# Patient Record
Sex: FEMALE | Race: OTHER
[De-identification: names, ages, dates, MRNs, and addresses within clinical notes are randomized per-mention and may not be internally consistent; named-entity substitution may affect disease eponyms.]

---

## 2017-10-08 ENCOUNTER — HOSPITAL ENCOUNTER (EMERGENCY)
Dept: HOSPITAL 61 - ER | Age: 82
Discharge: HOME | End: 2017-10-08
Payer: MEDICARE

## 2017-10-08 VITALS — BODY MASS INDEX: 23 KG/M2 | HEIGHT: 62 IN | WEIGHT: 125 LBS

## 2017-10-08 VITALS — SYSTOLIC BLOOD PRESSURE: 159 MMHG | DIASTOLIC BLOOD PRESSURE: 69 MMHG

## 2017-10-08 DIAGNOSIS — I10: ICD-10-CM

## 2017-10-08 DIAGNOSIS — E11.9: ICD-10-CM

## 2017-10-08 DIAGNOSIS — Z79.4: ICD-10-CM

## 2017-10-08 DIAGNOSIS — Z76.0: Primary | ICD-10-CM

## 2017-10-08 PROCEDURE — 82962 GLUCOSE BLOOD TEST: CPT

## 2017-10-08 PROCEDURE — 99283 EMERGENCY DEPT VISIT LOW MDM: CPT

## 2017-10-08 PROCEDURE — 96372 THER/PROPH/DIAG INJ SC/IM: CPT

## 2017-10-08 NOTE — PHYS DOC
Past Medical History


Past Medical History:  Diabetes-Type II, Hypertension


Past Surgical History:  No Surgical History


Alcohol Use:  None


Drug Use:  None





Adult General


Chief Complaint


Chief Complaint:  MEDICATION REFILL





HPI


HPI





Patient is a 86  year old female with history of hypertension and diabetes type 

2 who presents today requesting a NovoLog pen from the ED. Patient states she 

has a prescription  for Novolog but ran out of the medication this morning.  

Patient has no symptoms. Blood sugar was 174 on arrival to the ED.





Review of Systems


Review of Systems





Constitutional: Denies fever or chills []


Eyes: Denies change in visual acuity, redness, or eye pain []


HENT: Denies nasal congestion or sore throat []


Respiratory: Denies cough or shortness of breath []


Cardiovascular: No additional information not addressed in HPI []


GI: Denies abdominal pain, nausea, vomiting, bloody stools or diarrhea []


: Denies dysuria or hematuria []


Musculoskeletal: Denies back pain or joint pain []


Integument: Denies rash or skin lesions []


Neurologic: novolog refill





Current Medications


Current Medications





Current Medications








 Medications


  (Trade)  Dose


 Ordered  Sig/Renetta  Start Time


 Stop Time Status Last Admin


Dose Admin


 


 Insulin Aspart


  (NovoLOG)  6 units  1X  STAT  10/8/17 11:00


 10/8/17 11:01 UNV  


 











Allergies


Allergies





Allergies








Coded Allergies Type Severity Reaction Last Updated Verified


 


  No Known Drug Allergies    10/8/17 No











Physical Exam


Physical Exam





Constitutional: Well developed, well nourished, no acute distress, non-toxic 

appearance. []


HENT: Normocephalic, atraumatic, bilateral external ears normal, oropharynx 

moist, no oral exudates, nose normal. []


Eyes: PERRLA, EOMI, conjunctiva normal, no discharge. [] 


Neck: Normal range of motion, no tenderness, supple, no stridor. [] 


Cardiovascular:Heart rate regular rhythm, no murmur []


Lungs & Thorax:  Bilateral breath sounds clear to auscultation []


Abdomen: Bowel sounds normal, soft, no tenderness, no masses, no pulsatile 

masses. [] 


Skin: Warm, dry, no erythema, no rash. [] 


Back: No tenderness, no CVA tenderness. [] 


Extremities: No tenderness, no cyanosis, no clubbing, ROM intact, no edema. [] 


Neurologic: Alert and oriented X 3, normal motor function, normal sensory 

function, no focal deficits noted. []


Psychologic: Affect normal, judgement normal, mood normal. []





Current Patient Data


Vital Signs





 Vital Signs








  Date Time  Temp Pulse Resp B/P (MAP) Pulse Ox O2 Delivery O2 Flow Rate FiO2


 


10/8/17 10:30 98.2 94 16  97 Room Air  





 98.2       








Lab Values





 Laboratory Tests








Test


  10/8/17


10:22


 


Glucose (Fingerstick)


  174 mg/dL


(70-99)  H











EKG


EKG


[]





Radiology/Procedures


Radiology/Procedures


[]





Course & Med Decision Making


Course & Med Decision Making


Pertinent Labs and Imaging studies reviewed. (See chart for details)





Patient is in the ED requesting a refill for NovoLog pen. She has a 

prescription with multiple refills but is not able to go to the pharmacy today. 

NovoLog pain was provided to this patient. She is to ensure she refills her 

medication before they ran out. BG was 174 in the Ed





Dragon Disclaimer


Dragon Disclaimer


This electronic medical record was generated, in whole or in part, using a 

voice recognition dictation system.





Departure


Departure


Impression:  


 Primary Impression:  


 Encounter for medication refill


Disposition:  01 HOME, SELF-CARE


Condition:  STABLE


Referrals:  


ROSALIA ELIZONDO MD (PCP)


follow up with your  doctor this week


Patient Instructions:  Diabetes Meal Planning Guide, Diabetes and Exercise-

SportsMed





Additional Instructions:  


We gave you NovoLog pen in the ED. Ensure you always refill your prescriptions 

prior to completing your medications. Follow-up with your doctor this week.











NANCY ZEPEDA Oct 8, 2017 11:08

## 2019-08-19 ENCOUNTER — HOSPITAL ENCOUNTER (INPATIENT)
Dept: HOSPITAL 61 - ER | Age: 84
LOS: 3 days | Discharge: NURSING, REHAB OR LONG-TERM CARE | DRG: 64 | End: 2019-08-22
Attending: INTERNAL MEDICINE | Admitting: INTERNAL MEDICINE
Payer: MEDICARE

## 2019-08-19 VITALS — DIASTOLIC BLOOD PRESSURE: 64 MMHG | SYSTOLIC BLOOD PRESSURE: 144 MMHG

## 2019-08-19 VITALS — DIASTOLIC BLOOD PRESSURE: 43 MMHG | SYSTOLIC BLOOD PRESSURE: 102 MMHG

## 2019-08-19 VITALS — DIASTOLIC BLOOD PRESSURE: 63 MMHG | SYSTOLIC BLOOD PRESSURE: 144 MMHG

## 2019-08-19 VITALS — SYSTOLIC BLOOD PRESSURE: 114 MMHG | DIASTOLIC BLOOD PRESSURE: 54 MMHG

## 2019-08-19 VITALS — SYSTOLIC BLOOD PRESSURE: 132 MMHG | DIASTOLIC BLOOD PRESSURE: 62 MMHG

## 2019-08-19 VITALS — SYSTOLIC BLOOD PRESSURE: 119 MMHG | DIASTOLIC BLOOD PRESSURE: 57 MMHG

## 2019-08-19 VITALS — WEIGHT: 138.44 LBS | BODY MASS INDEX: 27.18 KG/M2 | HEIGHT: 60 IN

## 2019-08-19 VITALS — SYSTOLIC BLOOD PRESSURE: 139 MMHG | DIASTOLIC BLOOD PRESSURE: 63 MMHG

## 2019-08-19 VITALS — DIASTOLIC BLOOD PRESSURE: 56 MMHG | SYSTOLIC BLOOD PRESSURE: 118 MMHG

## 2019-08-19 VITALS — SYSTOLIC BLOOD PRESSURE: 178 MMHG | DIASTOLIC BLOOD PRESSURE: 74 MMHG

## 2019-08-19 VITALS — DIASTOLIC BLOOD PRESSURE: 63 MMHG | SYSTOLIC BLOOD PRESSURE: 142 MMHG

## 2019-08-19 VITALS — SYSTOLIC BLOOD PRESSURE: 126 MMHG | DIASTOLIC BLOOD PRESSURE: 59 MMHG

## 2019-08-19 VITALS — SYSTOLIC BLOOD PRESSURE: 144 MMHG | DIASTOLIC BLOOD PRESSURE: 63 MMHG

## 2019-08-19 VITALS — DIASTOLIC BLOOD PRESSURE: 56 MMHG | SYSTOLIC BLOOD PRESSURE: 114 MMHG

## 2019-08-19 VITALS — SYSTOLIC BLOOD PRESSURE: 118 MMHG | DIASTOLIC BLOOD PRESSURE: 56 MMHG

## 2019-08-19 VITALS — DIASTOLIC BLOOD PRESSURE: 62 MMHG | SYSTOLIC BLOOD PRESSURE: 139 MMHG

## 2019-08-19 VITALS — SYSTOLIC BLOOD PRESSURE: 149 MMHG | DIASTOLIC BLOOD PRESSURE: 65 MMHG

## 2019-08-19 DIAGNOSIS — Z90.49: ICD-10-CM

## 2019-08-19 DIAGNOSIS — I63.40: Primary | ICD-10-CM

## 2019-08-19 DIAGNOSIS — F41.9: ICD-10-CM

## 2019-08-19 DIAGNOSIS — I10: ICD-10-CM

## 2019-08-19 DIAGNOSIS — E78.00: ICD-10-CM

## 2019-08-19 DIAGNOSIS — E11.9: ICD-10-CM

## 2019-08-19 DIAGNOSIS — I25.10: ICD-10-CM

## 2019-08-19 DIAGNOSIS — E87.1: ICD-10-CM

## 2019-08-19 DIAGNOSIS — G93.41: ICD-10-CM

## 2019-08-19 DIAGNOSIS — F32.9: ICD-10-CM

## 2019-08-19 DIAGNOSIS — E78.5: ICD-10-CM

## 2019-08-19 DIAGNOSIS — I21.A1: ICD-10-CM

## 2019-08-19 DIAGNOSIS — I35.0: ICD-10-CM

## 2019-08-19 LAB
ALBUMIN SERPL-MCNC: 3.4 G/DL (ref 3.4–5)
ALBUMIN/GLOB SERPL: 0.9 {RATIO} (ref 1–1.7)
ALP SERPL-CCNC: 68 U/L (ref 46–116)
ALT SERPL-CCNC: 22 U/L (ref 14–59)
ANION GAP SERPL CALC-SCNC: 8 MMOL/L (ref 6–14)
APTT PPP: (no result) S
AST SERPL-CCNC: 22 U/L (ref 15–37)
BACTERIA #/AREA URNS HPF: (no result) /HPF
BASOPHILS # BLD AUTO: 0 X10^3/UL (ref 0–0.2)
BASOPHILS NFR BLD: 0 % (ref 0–3)
BILIRUB SERPL-MCNC: 0.5 MG/DL (ref 0.2–1)
BILIRUB UR QL STRIP: NEGATIVE
BUN SERPL-MCNC: 18 MG/DL (ref 7–20)
BUN/CREAT SERPL: 18 (ref 6–20)
CALCIUM SERPL-MCNC: 8.9 MG/DL (ref 8.5–10.1)
CHLORIDE SERPL-SCNC: 92 MMOL/L (ref 98–107)
CO2 SERPL-SCNC: 25 MMOL/L (ref 21–32)
CREAT SERPL-MCNC: 1 MG/DL (ref 0.6–1)
EOSINOPHIL NFR BLD: 0 % (ref 0–3)
EOSINOPHIL NFR BLD: 0 X10^3/UL (ref 0–0.7)
ERYTHROCYTE [DISTWIDTH] IN BLOOD BY AUTOMATED COUNT: 12.9 % (ref 11.5–14.5)
FIBRINOGEN PPP-MCNC: CLEAR MG/DL
GFR SERPLBLD BASED ON 1.73 SQ M-ARVRAT: 52.3 ML/MIN
GLOBULIN SER-MCNC: 3.7 G/DL (ref 2.2–3.8)
GLUCOSE SERPL-MCNC: 103 MG/DL (ref 70–99)
HCT VFR BLD CALC: 29.9 % (ref 36–47)
HGB BLD-MCNC: 10.5 G/DL (ref 12–15.5)
LYMPHOCYTES # BLD: 1 X10^3/UL (ref 1–4.8)
LYMPHOCYTES NFR BLD AUTO: 9 % (ref 24–48)
MCH RBC QN AUTO: 29 PG (ref 25–35)
MCHC RBC AUTO-ENTMCNC: 35 G/DL (ref 31–37)
MCV RBC AUTO: 83 FL (ref 79–100)
MONO #: 0.8 X10^3/UL (ref 0–1.1)
MONOCYTES NFR BLD: 7 % (ref 0–9)
NEUT #: 9.4 X10^3/UL (ref 1.8–7.7)
NEUTROPHILS NFR BLD AUTO: 83 % (ref 31–73)
NITRITE UR QL STRIP: NEGATIVE
PH UR STRIP: 6.5 [PH]
PLATELET # BLD AUTO: 297 X10^3/UL (ref 140–400)
POTASSIUM SERPL-SCNC: 4.3 MMOL/L (ref 3.5–5.1)
PROT SERPL-MCNC: 7.1 G/DL (ref 6.4–8.2)
PROT UR STRIP-MCNC: 30 MG/DL
RBC # BLD AUTO: 3.59 X10^6/UL (ref 3.5–5.4)
RBC #/AREA URNS HPF: 0 /HPF (ref 0–2)
SODIUM SERPL-SCNC: 125 MMOL/L (ref 136–145)
SQUAMOUS #/AREA URNS LPF: (no result) /LPF
UROBILINOGEN UR-MCNC: 0.2 MG/DL
WBC # BLD AUTO: 11.2 X10^3/UL (ref 4–11)
WBC #/AREA URNS HPF: (no result) /HPF (ref 0–4)

## 2019-08-19 PROCEDURE — 93325 DOPPLER ECHO COLOR FLOW MAPG: CPT

## 2019-08-19 PROCEDURE — 99153 MOD SED SAME PHYS/QHP EA: CPT

## 2019-08-19 PROCEDURE — 80048 BASIC METABOLIC PNL TOTAL CA: CPT

## 2019-08-19 PROCEDURE — 80061 LIPID PANEL: CPT

## 2019-08-19 PROCEDURE — 85025 COMPLETE CBC W/AUTO DIFF WBC: CPT

## 2019-08-19 PROCEDURE — 93306 TTE W/DOPPLER COMPLETE: CPT

## 2019-08-19 PROCEDURE — 70551 MRI BRAIN STEM W/O DYE: CPT

## 2019-08-19 PROCEDURE — 82550 ASSAY OF CK (CPK): CPT

## 2019-08-19 PROCEDURE — 93320 DOPPLER ECHO COMPLETE: CPT

## 2019-08-19 PROCEDURE — 36415 COLL VENOUS BLD VENIPUNCTURE: CPT

## 2019-08-19 PROCEDURE — B2151ZZ FLUOROSCOPY OF LEFT HEART USING LOW OSMOLAR CONTRAST: ICD-10-PCS | Performed by: INTERNAL MEDICINE

## 2019-08-19 PROCEDURE — 84300 ASSAY OF URINE SODIUM: CPT

## 2019-08-19 PROCEDURE — B2111ZZ FLUOROSCOPY OF MULTIPLE CORONARY ARTERIES USING LOW OSMOLAR CONTRAST: ICD-10-PCS | Performed by: INTERNAL MEDICINE

## 2019-08-19 PROCEDURE — C1769 GUIDE WIRE: HCPCS

## 2019-08-19 PROCEDURE — 95816 EEG AWAKE AND DROWSY: CPT

## 2019-08-19 PROCEDURE — 93880 EXTRACRANIAL BILAT STUDY: CPT

## 2019-08-19 PROCEDURE — C1892 INTRO/SHEATH,FIXED,PEEL-AWAY: HCPCS

## 2019-08-19 PROCEDURE — 84484 ASSAY OF TROPONIN QUANT: CPT

## 2019-08-19 PROCEDURE — 71045 X-RAY EXAM CHEST 1 VIEW: CPT

## 2019-08-19 PROCEDURE — 4A023N7 MEASUREMENT OF CARDIAC SAMPLING AND PRESSURE, LEFT HEART, PERCUTANEOUS APPROACH: ICD-10-PCS | Performed by: INTERNAL MEDICINE

## 2019-08-19 PROCEDURE — 93005 ELECTROCARDIOGRAM TRACING: CPT

## 2019-08-19 PROCEDURE — 70450 CT HEAD/BRAIN W/O DYE: CPT

## 2019-08-19 PROCEDURE — 80053 COMPREHEN METABOLIC PANEL: CPT

## 2019-08-19 PROCEDURE — 93458 L HRT ARTERY/VENTRICLE ANGIO: CPT

## 2019-08-19 PROCEDURE — 83880 ASSAY OF NATRIURETIC PEPTIDE: CPT

## 2019-08-19 PROCEDURE — G0378 HOSPITAL OBSERVATION PER HR: HCPCS

## 2019-08-19 PROCEDURE — 82962 GLUCOSE BLOOD TEST: CPT

## 2019-08-19 PROCEDURE — 99152 MOD SED SAME PHYS/QHP 5/>YRS: CPT

## 2019-08-19 PROCEDURE — 84443 ASSAY THYROID STIM HORMONE: CPT

## 2019-08-19 PROCEDURE — 81001 URINALYSIS AUTO W/SCOPE: CPT

## 2019-08-19 PROCEDURE — 93312 ECHO TRANSESOPHAGEAL: CPT

## 2019-08-19 RX ADMIN — BACITRACIN SCH MLS/HR: 5000 INJECTION, POWDER, FOR SOLUTION INTRAMUSCULAR at 12:05

## 2019-08-19 RX ADMIN — LISINOPRIL SCH MG: 10 TABLET ORAL at 20:08

## 2019-08-19 RX ADMIN — INSULIN LISPRO SCH UNITS: 100 INJECTION, SOLUTION INTRAVENOUS; SUBCUTANEOUS at 17:00

## 2019-08-19 RX ADMIN — INSULIN GLARGINE SCH UNITS: 100 INJECTION, SOLUTION SUBCUTANEOUS at 20:21

## 2019-08-19 RX ADMIN — INSULIN LISPRO SCH UNITS: 100 INJECTION, SOLUTION INTRAVENOUS; SUBCUTANEOUS at 17:30

## 2019-08-19 RX ADMIN — SODIUM CHLORIDE SCH MLS/HR: 450 INJECTION, SOLUTION INTRAVENOUS at 12:18

## 2019-08-19 RX ADMIN — SODIUM CHLORIDE SCH MLS/HR: 450 INJECTION, SOLUTION INTRAVENOUS at 20:49

## 2019-08-19 RX ADMIN — BACITRACIN SCH MLS/HR: 5000 INJECTION, POWDER, FOR SOLUTION INTRAMUSCULAR at 04:41

## 2019-08-19 NOTE — PDOC
MODERATE SEDATION ASSESSMENT


RISKS/ALTERNATIVES


Risks/Alternatives


Risks and alternatives of this type of sedation and procedure discussed with:


RISK/ALTERNATIVES:  Patient





H & P ON CHART


H & P


H & P on chart and reviewed for co-morbid conditions and appropriate labs.


H&P ON CHART:  Yes





PREGNANCY STATUS


PREG STATUS ASSESSED:  N/A





MEDS/ALLERGIES REVIEWED


Meds/Allergies Reviewed


Medications and Allergies including time and route of recently administered 

narcotics and sedatives.


MEDS/ALLERGIES REVIEWED:  Yes





ASA RATING


ASA RATING:  III





AIRWAY ASSESSMENT


Airway Assessment


Airway patency, oral function limitations, presence  of caps, crowns, dentures, 

partials, and ability to extend neck assessed.


AIRWAY ASSESSMENT:  Yes





MALLAMPATI SCORE


MALLAMPATI SCORE:  II





PRE-SEDATION ASSESSMENT


PRE-SEDATION ASSESSMENT:  Yes











JOSE COTO MD           Aug 19, 2019 10:48

## 2019-08-19 NOTE — PDOC2
CONSULT


Date of Consult


Date of Consult


DATE: 8/19/19 


TIME: 09:34





Reason for Consult


Reason for Consult:


Elevated troponin level





Referring Physician


Referring Physician:


Dr. Goss





Identification/Chief Complaint


Chief Complaint


Dizziness and shortness of breath





Source


Source:  Chart review, Patient





History of Present Illness


Reason for Visit:


88-year-old female presented complaining of dizziness, dyspnea and retrosternal 

chest tightness. She denied any orthopnea/PND, palpitations or syncope. She 

denied any previous cardiac history other than 'heart murmur'.





Past Medical History


Past Medical History


Hypertension


Hypercholesterolemia


Diabetes mellitus type 2





Past Surgical History


Past Surgical History


Cholecystectomy





Family History


Family History


Negative for premature coronary artery disease





Social History


Social History


Denied any smoking, alcohol or drug use





Current Medications


Current Medications





Current Medications


Aspirin (Ecotrin) 324 mg 1X  ONCE PO  Last administered on 8/19/19at 03:10;  S

tart 8/19/19 at 03:30;  Stop 8/19/19 at 03:31;  Status DC


Ondansetron HCl (Zofran) 4 mg PRN Q8HRS  PRN IV NAUSEA/VOMITING 1ST CHOICE;  

Start 8/19/19 at 04:30;  Stop 8/20/19 at 04:29


Sodium Chloride 1,000 ml @  75 mls/hr [id number] IV  Last administered on 8/19/19at

04:41;  Start 8/19/19 at 05:00;  Stop 8/20/19 at 04:59


Lidocaine HCl (Xylocaine-Mpf 1% 2ml Vial) 2 ml STK-MED ONCE .ROUTE ;  Start 

8/19/19 at 09:30;  Stop 8/19/19 at 09:31;  Status DC


Iohexol (Omnipaque 300 Mg/ml) 100 ml STK-MED ONCE .ROUTE ;  Start 8/19/19 at 

09:30;  Stop 8/19/19 at 09:31;  Status DC


Heparin Sodium/ Sodium Chloride 500 ml @  As Directed STK-MED ONCE .ROUTE ;  

Start 8/19/19 at 09:30;  Stop 8/19/19 at 09:31;  Status DC





Active Scripts


Active


Reported


Novolog Flexpen (Insulin Aspart) 100 Unit/1 Ml Insuln.pen 10 Unit SQ DAILYWSUP


Novolog Flexpen (Insulin Aspart) 100 Unit/1 Ml Insuln.pen 5 Unit SQ DAILYWBKFT


Vitamin E (Vitamin E Acid Succinate) 100 Unit Tablet 100 Unit PO DAILY


Coq-10 (Ubidecarenone) 100 Mg Capsule 100 Mg PO DAILY


Lantus Solostar (Insulin Glargine,Hum.rec.anlog) 100 Unit/1 Ml Insuln.pen 17 

Unit SQ QHS


Caltrate 600 + D Soft Chew Tab (Calcium Carbonate/Vitamin D3) 1 Each Tab.chew 1 

Each PO DAILY


One Daily For Women 50+ Adv Tb (Multivitamins-Min/Fa/Ginkgo) 1 Each Tablet 1 

Each PO DAILY


Tylenol (Acetaminophen) 325 Mg Tablet 325 Mg PO DAILY


Aspirin Ec (Aspirin) 325 Mg Tablet.dr 325 Mg PO DAILY


Amlodipine Besylate 5 Mg Tablet 5 Mg PO DAILY


Zestril (Lisinopril) 10 Mg Tablet 10 Mg PO BID


Metformin Hcl 1,000 Mg Tablet 1,000 Mg PO BIDWMEALS





Allergies


Allergies:  


Coded Allergies:  


     No Known Drug Allergies (Unverified , 10/8/17)





ROS


PSYCHOLOGICAL ROS:  No: Hallucinations


Eyes:  No Loss of vision


HEENT:  No: Epistaxis


Respiratory:  YES: Shortness of breath; 


   No: Hemoptysis


Cardiovascular:  yes Chest Pain


Genitourinary:  No Hematuria


Neurological:  No Seizures


Skin:  No Rash





Physical Exam


General:  Alert, Oriented X3


HEENT:  Atraumatic, PERRLA


Lungs:  Clear to auscultation


Heart:  Regular rate, Other (ESM LUPSB)


Abdomen:  Soft, No tenderness


Extremities:  No edema


Neuro:  Normal speech


Psych/Mental Status:  Mood NL





Vitals


VITALS





Vital Signs








  Date Time  Temp Pulse Resp B/P (MAP) Pulse Ox O2 Delivery O2 Flow Rate FiO2


 


8/19/19 07:00 98.5 62 18 139/63 (88) 97 Room Air  





 98.5       











Labs


Labs





Laboratory Tests








Test


 8/19/19


02:15 8/19/19


07:22 8/19/19


07:25


 


White Blood Count


 11.2 x10^3/uL


(4.0-11.0) 


 





 


Red Blood Count


 3.59 x10^6/uL


(3.50-5.40) 


 





 


Hemoglobin


 10.5 g/dL


(12.0-15.5) 


 





 


Hematocrit


 29.9 %


(36.0-47.0) 


 





 


Mean Corpuscular Volume 83 fL ()   


 


Mean Corpuscular Hemoglobin 29 pg (25-35)   


 


Mean Corpuscular Hemoglobin


Concent 35 g/dL


(31-37) 


 





 


Red Cell Distribution Width


 12.9 %


(11.5-14.5) 


 





 


Platelet Count


 297 x10^3/uL


(140-400) 


 





 


Neutrophils (%) (Auto) 83 % (31-73)   


 


Lymphocytes (%) (Auto) 9 % (24-48)   


 


Monocytes (%) (Auto) 7 % (0-9)   


 


Eosinophils (%) (Auto) 0 % (0-3)   


 


Basophils (%) (Auto) 0 % (0-3)   


 


Neutrophils # (Auto)


 9.4 x10^3/uL


(1.8-7.7) 


 





 


Lymphocytes # (Auto)


 1.0 x10^3/uL


(1.0-4.8) 


 





 


Monocytes # (Auto)


 0.8 x10^3/uL


(0.0-1.1) 


 





 


Eosinophils # (Auto)


 0.0 x10^3/uL


(0.0-0.7) 


 





 


Basophils # (Auto)


 0.0 x10^3/uL


(0.0-0.2) 


 





 


Sodium Level


 125 mmol/L


(136-145) 


 





 


Potassium Level


 4.3 mmol/L


(3.5-5.1) 


 





 


Chloride Level


 92 mmol/L


() 


 





 


Carbon Dioxide Level


 25 mmol/L


(21-32) 


 





 


Anion Gap 8 (6-14)   


 


Blood Urea Nitrogen


 18 mg/dL


(7-20) 


 





 


Creatinine


 1.0 mg/dL


(0.6-1.0) 


 





 


Estimated GFR


(Cockcroft-Gault) 52.3 


 


 





 


BUN/Creatinine Ratio 18 (6-20)   


 


Glucose Level


 103 mg/dL


(70-99) 


 





 


Calcium Level


 8.9 mg/dL


(8.5-10.1) 


 





 


Total Bilirubin


 0.5 mg/dL


(0.2-1.0) 


 





 


Aspartate Amino Transf


(AST/SGOT) 22 U/L (15-37) 


 


 





 


Alanine Aminotransferase


(ALT/SGPT) 22 U/L (14-59) 


 


 





 


Alkaline Phosphatase


 68 U/L


() 


 





 


Creatine Kinase


 137 U/L


() 


 





 


Troponin I Quantitative


 0.334 ng/mL


(0.000-0.055) 


 1.305 ng/mL


(0.000-0.055)


 


NT-Pro-B-Type Natriuretic


Peptide 857 pg/mL


(0-449) 


 





 


Total Protein


 7.1 g/dL


(6.4-8.2) 


 





 


Albumin


 3.4 g/dL


(3.4-5.0) 


 





 


Albumin/Globulin Ratio 0.9 (1.0-1.7)   


 


Thyroid Stimulating Hormone


(TSH) 5.277 uIU/mL


(0.358-3.74) 


 





 


Glucose (Fingerstick)


 


 89 mg/dL


(70-99) 











Laboratory Tests








Test


 8/19/19


02:15 8/19/19


07:22 8/19/19


07:25


 


White Blood Count


 11.2 x10^3/uL


(4.0-11.0) 


 





 


Red Blood Count


 3.59 x10^6/uL


(3.50-5.40) 


 





 


Hemoglobin


 10.5 g/dL


(12.0-15.5) 


 





 


Hematocrit


 29.9 %


(36.0-47.0) 


 





 


Mean Corpuscular Volume 83 fL ()   


 


Mean Corpuscular Hemoglobin 29 pg (25-35)   


 


Mean Corpuscular Hemoglobin


Concent 35 g/dL


(31-37) 


 





 


Red Cell Distribution Width


 12.9 %


(11.5-14.5) 


 





 


Platelet Count


 297 x10^3/uL


(140-400) 


 





 


Neutrophils (%) (Auto) 83 % (31-73)   


 


Lymphocytes (%) (Auto) 9 % (24-48)   


 


Monocytes (%) (Auto) 7 % (0-9)   


 


Eosinophils (%) (Auto) 0 % (0-3)   


 


Basophils (%) (Auto) 0 % (0-3)   


 


Neutrophils # (Auto)


 9.4 x10^3/uL


(1.8-7.7) 


 





 


Lymphocytes # (Auto)


 1.0 x10^3/uL


(1.0-4.8) 


 





 


Monocytes # (Auto)


 0.8 x10^3/uL


(0.0-1.1) 


 





 


Eosinophils # (Auto)


 0.0 x10^3/uL


(0.0-0.7) 


 





 


Basophils # (Auto)


 0.0 x10^3/uL


(0.0-0.2) 


 





 


Sodium Level


 125 mmol/L


(136-145) 


 





 


Potassium Level


 4.3 mmol/L


(3.5-5.1) 


 





 


Chloride Level


 92 mmol/L


() 


 





 


Carbon Dioxide Level


 25 mmol/L


(21-32) 


 





 


Anion Gap 8 (6-14)   


 


Blood Urea Nitrogen


 18 mg/dL


(7-20) 


 





 


Creatinine


 1.0 mg/dL


(0.6-1.0) 


 





 


Estimated GFR


(Cockcroft-Gault) 52.3 


 


 





 


BUN/Creatinine Ratio 18 (6-20)   


 


Glucose Level


 103 mg/dL


(70-99) 


 





 


Calcium Level


 8.9 mg/dL


(8.5-10.1) 


 





 


Total Bilirubin


 0.5 mg/dL


(0.2-1.0) 


 





 


Aspartate Amino Transf


(AST/SGOT) 22 U/L (15-37) 


 


 





 


Alanine Aminotransferase


(ALT/SGPT) 22 U/L (14-59) 


 


 





 


Alkaline Phosphatase


 68 U/L


() 


 





 


Creatine Kinase


 137 U/L


() 


 





 


Troponin I Quantitative


 0.334 ng/mL


(0.000-0.055) 


 1.305 ng/mL


(0.000-0.055)


 


NT-Pro-B-Type Natriuretic


Peptide 857 pg/mL


(0-449) 


 





 


Total Protein


 7.1 g/dL


(6.4-8.2) 


 





 


Albumin


 3.4 g/dL


(3.4-5.0) 


 





 


Albumin/Globulin Ratio 0.9 (1.0-1.7)   


 


Thyroid Stimulating Hormone


(TSH) 5.277 uIU/mL


(0.358-3.74) 


 





 


Glucose (Fingerstick)


 


 89 mg/dL


(70-99) 














Assessment/Plan


Assessment/Plan


1.  Acute non-STEMI. Patient's shortness of breath is also probably anginal 

equivalent. Chest x-ray did not show any evidence of congestive heart failure. 

The option of cardiac catheterization for definitive evaluation was discussed in

detail and she is agreeable.


2.  Accelerated hypertension:  Better controlled since admission


3.  Diabetes mellitus type 2:  Treat per IM. Hold metformin for cardiac cath.


Thank you for your consultation











JOSE COTO MD           Aug 19, 2019 09:34

## 2019-08-19 NOTE — CARD
MR#: [id number]

Account#: [id number]

Accession#: [id number]

Date of Study: 08/19/2019

Ordering Physician: JOSE COTO, 

Referring Physician: JOSE COTO 

Tech: PASTORA BOSTON RTR





--------------- APPROVED REPORT --------------





Technologist: PASTORA BOSTON RTR

Nurse: Noni Anaya R.N.



Procedure(s) performed: Left heart catheterization, selective coronary angiography and left ventricul
ography via right transradial approach



MODERATE SEDATION TIME: 31 MINUTES

FLUORO TIME: 4.1 MIN

DOSE: 24.1 GYCM2

CONTRAST: 94



INDICATION

The indication(s) include : non-STEMI .



[facility] Clinical Frailty Scale

[facility] Clinical Frailty Scale: Mildly Frail



Heart Failure

Heart Failure: No



PROCEDURE NARRATIVE

After explaining the risks, benefits and alternative options, informed consent was obtained from maryann
ent.  Patient was brought to the cardiac Cath Lab and right wrist was prepped and draped in the usual
 fashion after confirming a positive modified Charan's test.  Arterial access was obtained in the [facility]
t radial artery and a 6 Macedonian sheath was inserted.  6 Macedonian Leonardo catheter and 6 Macedonian JL 3.5 cat
heter were used to perform selective angiography of the right and left coronary arteries.  6 Macedonian p
igtail catheter was used to perform left ventriculography.  Patient tolerated the procedure well.  He
mostasis was achieved using TR band.  There were no immediate complications.  The following findings 
were noted.



FINDINGS

1.  Hemodynamics: Left ventricular end-diastolic pressure of 23 mmHg.  No pullback gradient across th
e aortic valve.

2.  Left ventriculography: Hyperdynamic left ventricle systolic function with ejection fraction estim
ated at 80%.  No significant mitral regurgitation seen.

3.  Coronary angiography:

a.  The left main coronary artery arose from the left sinus of Valsalva, gave rise to the left anteri
or descending and left circumflex arteries and did not show any significant stenosis.

b.  The left anterior descending artery showed 30% stenosis in the midsegment.

c.  The left circumflex artery did not show any significant stenosis.

d.  The right coronary artery was a large and dominant vessel arising from the right sinus of Valsalv
a that did showed 40% stenosis in the posterior descending branch.



Conclusion

1.  Nonobstructive coronary artery disease

2.  Hyperdynamic left ventricle systolic function with ejection fraction estimated at 80%



Recommendations

Patient's non-STEMI is most probably type 2/demand ischemia. Recommend medical management.



Signed by : Jose Coto, 

Electronically Approved : 08/19/2019 11:03:29

## 2019-08-19 NOTE — NUR
Dr. Silva returned phone call. Informed him that the Trop was 0.33 and has went up to 
1.305 this morning for the second draw and that there is some elevation in EKG. Instructed 
RN to wait until nurse practitioner rounds.

## 2019-08-19 NOTE — EKG
[facility]

              [address] [place] [address]

Test Date:    2019               Test Time:    05:52:22

Pat Name:     OSCAR VILLAFANA           Department:   

Patient ID:   [id number]           Room:         260 1

Gender:       F                        Technician:   [facility]

:          1930               Requested By: VIRGINIA BASHIR

Order Number: [id number]           Reading MD:   Fred Coronel MD

                                 Measurements

Intervals                              Axis          

Rate:         68                       P:            59

MS:           188                      QRS:          -33

QRSD:         88                       T:            68

QT:           414                                    

QTc:          445                                    

                           Interpretive Statements

SINUS RHYTHM



Electronically Signed On 2019 9:53:18 CDT by Fred Coronel MD Unknown

## 2019-08-19 NOTE — NUR
Patient arrived to unit at 0340 accompanied by son and ED nurse. VS stable, Assessment 
complete. No complaints of pain at this time. Patient stated that she has two lumps on the 
back of her head that are new within the last couple days but has not had any type of fall. 
per patient she has been dizzy for the past two days. Resting comfortably on RA. bed in low 
locked position, call light in reach. will continue to monitor patient.

## 2019-08-19 NOTE — CARD
MR#: [id number]

Account#: [id number]

Accession#: [id number]

Date of Study: 08/19/2019

Ordering Physician: JOSE COTO, 

Referring Physician: JOSE COTO 

Tech: Lizzy Olson RDCS





--------------- APPROVED REPORT --------------





EXAM: Two-dimensional and M-mode echocardiogram with Doppler and color Doppler.



Other Information 

Quality : AverageHR: 60bpm

Rhythm : NSR



INDICATION

Non STEMI



2D DIMENSIONS 

RVDd2.0 (2.9-3.5cm)Left Atrium(2D)3.5 (1.6-4.0cm)

IVSd1.1 (0.7-1.1cm)Aortic Root(2D)2.6 (2.0-3.7cm)

LVDd3.9 (3.9-5.9cm)LVOT Diameter1.7 (1.8-2.4cm)

PWd0.8 (0.7-1.1cm)LVDs2.2 (2.5-4.0cm)

FS (%) 43.8 %SV50.6 ml



M-Mode DIMENSIONS 

Left Atrium(MM)3.95 (2.5-4.0cm)Aortic Root2.58 (2.2-3.7cm)



Aortic Valve

AoV Peak Femi.188.8cm/sAoV VTI49.5cm

AO Peak GR.14.3mmHgLVOT  VTI 25.78cm

AO Mean GR.10mmHgAVA   (VTI)1.40cm2



Mitral Valve

MV E Xpnweubl466.5cm/sMV E Peak Gr.8mmHg

MV DECEL [id number] A Shtwqwfk396.8cm/s

MV E Mean Gr.3mmHgE/A  Ratio0.9

MV A Myidlnlp212fq



TDI

Medial E' P. V5.28cm/sE/Medial E'22.6



Tricuspid Valve

TR P. Qjqkngmo575su/sRAP OFHJRMKF7ewTa

TR Peak Gr.01qvVoBLWU54znIy



 LEFT VENTRICLE 

The left ventricle is normal size. There is mild concentric left ventricular hypertrophy. The left ve
ntricle is hyperdynamic. The Ejection Fraction is >65%. There is normal LV segmental wall motion. Tra
nsmitral Doppler flow pattern is abnormal.



 RIGHT VENTRICLE 

The right ventricle is normal size. There is normal right ventricular wall thickness. The right ventr
icular systolic function is normal.



 ATRIA 

The left atrium size is normal. The right atrium size is normal. The interatrial septum is intact wit
h no evidence for an atrial septal defect or patent foramen ovale as noted on 2-D or Doppler imaging.




 AORTIC VALVE 

The aortic valve is mildly calcified. The aortic valve is trileaflet. Doppler and Color Flow revealed
 no significant aortic regurgitation. There is mild valvular aortic stenosis. Calculated aortic valve
 area is 1.4 cm2 with maximum pressure gradient of 14 mmHg and mean pressure gradient of 10 mmHg.



 MITRAL VALVE 

Mitral annular calcification is moderate. There is no evidence of mitral valve prolapse. There is no 
mitral valve stenosis. Doppler and Color-flow revealed trace to mild mitral regurgitation.



 TRICUSPID VALVE 

The tricuspid valve is normal in structure and function. Doppler and Color Flow revealed trace tricus
pid regurgitation. The PA pressure was estimated at 26 mmHg. There is no tricuspid valve prolapse or 
vegetation. There is no tricuspid valve stenosis.



 PULMONIC VALVE 

The pulmonic valve is not well visualized.



 GREAT VESSELS 

The aortic root is normal in size. The ascending aorta is normal in size. The IVC is normal in size a
nd collapses >50% with inspiration.



 PERICARDIAL EFFUSION 

There is no evidence of significant pericardial effusion.



Critical Notification

Critical Value: No



<Conclusion>

The left ventricle is normal size.

The left ventricle is hyperdynamic.

The Ejection Fraction is >65%.

There is mild concentric left ventricular hypertrophy.

There is mild valvular aortic stenosis. 

Calculated aortic valve area is 1.4 cm2 with maximum pressure gradient of 14 mmHg and mean pressure g
radient of 10 mmHg.

Doppler and Color Flow revealed no significant aortic regurgitation.

Doppler and Color-flow revealed trace to mild mitral regurgitation.

Doppler and Color Flow revealed trace tricuspid regurgitation.

The PA pressure was estimated at 26 mmHg.



Signed by : Willy Odell MD

Electronically Approved : 08/19/2019 13:21:40

## 2019-08-19 NOTE — NUR
SS following for discharge planning. SS reviewed pt chart. Pt is from home and is currently 
on room air. PT/OT ordered. SS will await PT/OT evaluations and recommendations and will 
proceed accordingly with discharge planning.

## 2019-08-19 NOTE — RAD
EXAM: CT HEAD WITHOUT CONTRAST.

 

HISTORY: Dizziness, vertigo.

 

TECHNIQUE: Computed tomography of the head was performed without 

intravenous contrast.

 

COMPARISON: None.

 

FINDINGS: There is no intracranial hemorrhage. Hypoattenuation within the 

white matter indicates moderate to severe chronic microangiopathic change.

Prominence of the lateral ventricles and hemispheric sulci indicates 

moderate atrophy.

 

The visualized paranasal sinuses appear clear. There are changes of 

bilateral cataract surgery. The temporal bones are unremarkable. The 

calvarium reveals no suspicious lesions. There are atherosclerotic 

calcifications of the internal carotid and vertebral arteries.

 

IMPRESSION:

1. No acute intracranial findings.

2. 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 

matter change.

 

 

*One or more of the following individualized dose reduction techniques 

were utilized for this examination:  

1. Automated exposure control.  

2. Adjustment of the mA and/or kV according to patient size.  

3. Use of iterative reconstruction technique.

 

Electronically signed by: TITA Martinez MD (8/19/2019 3:25 AM) 

[facility]

## 2019-08-19 NOTE — PHYS DOC
Past Medical History


Past Medical History:  CAD, Diabetes-Type II, High Cholesterol, Hypertension


Past Surgical History:  Cholecystectomy


Alcohol Use:  None


Drug Use:  None





Adult General


Chief Complaint


Chief Complaint:  DIZZY/LIGHT HEADED





HPI


HPI


Patient is a 88  year old female presents with multiple medical complaints. Pa

tient complaints of posterior chest pressure, dizziness upon standing is 

resolved when supine. Patient denies change of vision, neck pain, palpitations, 

focal extremity weakness or loss of sensation. Denies history of CAD. Patient 

also reports partial numbness in left arm and nausea. No vomiting. No other 

acute symptoms or complaints. Symptoms began several hours prior to ED arrival. 

History obtained from the patient's son. []





Review of Systems


Review of Systems


Review symptoms as per history of present illness. All other review symptoms are

 negative.





All other systems were reviewed and found to be within normal limits, except as 

documented in this note.





Allergies


Allergies





Allergies








Coded Allergies Type Severity Reaction Last Updated Verified


 


  No Known Drug Allergies    10/8/17 No











Physical Exam


Physical Exam





Constitutional: Well developed, well nourished, no acute distress, non-toxic 

appearance. []


HENT: Normocephalic, atraumatic, bilateral external ears normal, oropharynx 

moist, no oral exudates, nose normal. []


Eyes: PERRLA, EOMI, conjunctiva normal, no discharge. [] 


Neck: Normal range of motion, no tenderness, supple, no stridor. [] 


Cardiovascular:Heart rate regular rhythm, no murmur []


Lungs & Thorax:  Bilateral breath sounds clear to auscultation []


Abdomen: Bowel sounds normal, soft, no tenderness. [] 


Skin: Warm, dry. [] 


Back: No tenderness, no CVA tenderness. [] 


Extremities: No tenderness, no cyanosis, no clubbing, ROM intact, no edema. [] 


Neurologic: Alert and oriented X 3, renal nerves II through XII grossly intact, 

normal motor function, normal sensory function, no focal deficits noted. NIH of 

0.[]


Psychologic: Affect normal, judgement normal, mood normal. []





Current Patient Data


Vital Signs





                                   Vital Signs








  Date Time  Temp Pulse Resp B/P (MAP) Pulse Ox O2 Delivery O2 Flow Rate FiO2


 


8/19/19 03:00  65 14  98   


 


8/19/19 01:53 98.4   181/75 (110)  Room Air  





 98.4       








Lab Values





                                Laboratory Tests








Test


 8/19/19


02:15


 


White Blood Count


 11.2 x10^3/uL


(4.0-11.0)  H


 


Red Blood Count


 3.59 x10^6/uL


(3.50-5.40)


 


Hemoglobin


 10.5 g/dL


(12.0-15.5)  L


 


Hematocrit


 29.9 %


(36.0-47.0)  L


 


Mean Corpuscular Volume


 83 fL ()





 


Mean Corpuscular Hemoglobin 29 pg (25-35)  


 


Mean Corpuscular Hemoglobin


Concent 35 g/dL


(31-37)


 


Red Cell Distribution Width


 12.9 %


(11.5-14.5)


 


Platelet Count


 297 x10^3/uL


(140-400)


 


Neutrophils (%) (Auto) 83 % (31-73)  H


 


Lymphocytes (%) (Auto) 9 % (24-48)  L


 


Monocytes (%) (Auto) 7 % (0-9)  


 


Eosinophils (%) (Auto) 0 % (0-3)  


 


Basophils (%) (Auto) 0 % (0-3)  


 


Neutrophils # (Auto)


 9.4 x10^3/uL


(1.8-7.7)  H


 


Lymphocytes # (Auto)


 1.0 x10^3/uL


(1.0-4.8)


 


Monocytes # (Auto)


 0.8 x10^3/uL


(0.0-1.1)


 


Eosinophils # (Auto)


 0.0 x10^3/uL


(0.0-0.7)


 


Basophils # (Auto)


 0.0 x10^3/uL


(0.0-0.2)


 


Sodium Level


 125 mmol/L


(136-145)  L


 


Potassium Level


 4.3 mmol/L


(3.5-5.1)


 


Chloride Level


 92 mmol/L


()  L


 


Carbon Dioxide Level


 25 mmol/L


(21-32)


 


Anion Gap 8 (6-14)  


 


Blood Urea Nitrogen


 18 mg/dL


(7-20)


 


Creatinine


 1.0 mg/dL


(0.6-1.0)


 


Estimated GFR


(Cockcroft-Gault) 52.3  





 


BUN/Creatinine Ratio 18 (6-20)  


 


Glucose Level


 103 mg/dL


(70-99)  H


 


Calcium Level


 8.9 mg/dL


(8.5-10.1)


 


Total Bilirubin


 0.5 mg/dL


(0.2-1.0)


 


Aspartate Amino Transferase


(AST) 22 U/L (15-37)





 


Alanine Aminotransferase (ALT)


 22 U/L (14-59)





 


Alkaline Phosphatase


 68 U/L


()


 


Creatine Kinase


 137 U/L


()


 


Troponin I Quantitative


 0.334 ng/mL


(0.000-0.055)


 


NT-Pro-B-Type Natriuretic


Peptide 857 pg/mL


(0-449)  H


 


Total Protein


 7.1 g/dL


(6.4-8.2)


 


Albumin


 3.4 g/dL


(3.4-5.0)


 


Albumin/Globulin Ratio


 0.9 (1.0-1.7)


L


 


Thyroid Stimulating Hormone


(TSH) 5.277 uIU/mL


(0.358-3.74)  H





                                Laboratory Tests


8/19/19 02:15








                                Laboratory Tests


8/19/19 02:15














EKG


EKG


[EKG: Reviewed, rhythm, rate 73, nonspecific ST-T wave changes.]





Radiology/Procedures


Radiology/Procedures


[CT head: NAD per radiology report





CXR: NAD]





Course & Med Decision Making


Course & Med Decision Making


Pertinent Labs and Imaging studies reviewed. (See chart for details)





[Patient with multiple symptoms, elevated troponin, left arm numbness, no acute 

ST elevation on EKG. CT head negative, and I stroke score 0. Aspirin given. 

Sodium 125. No prior labs Will admit to the hospitalist service for further eval

uation and treatment with cardiology consult.]





Dragon Disclaimer


Dragon Disclaimer


This electronic medical record was generated, in whole or in part, using a voice

recognition dictation system.





Departure


Departure


Impression:  


   Primary Impression:  


   Elevated troponin


   Additional Impressions:  


   Hyponatremia


   Dizziness


Disposition:  09 ADMITTED AS INPATIENT


Condition:  STABLE


Referrals:  


ROSALIA ELIZONDO MD (PCP)





Problem Qualifiers











VIRGINIA BASHIR DO                   Aug 19, 2019 04:24

## 2019-08-19 NOTE — NUR
Call to answering service to inform of consult and elevated troponin. No one on call at this 
time because nurse practitioner expected. Information given to answering service.

## 2019-08-19 NOTE — PDOC1
History and Physical


Date of Admission


Date of Admission


DATE: 8/19/19 


TIME: 10:35





Identification/Chief Complaint


Chief Complaint


88  year old female presents to er  with multiple medical complaints. Patient 

complaints of posterior chest pressure, dizziness upon standing is resolved when

supine. Patient denies change of vision, neck pain, palpitations, focal 

extremity weakness or loss of sensation. Denies history of CAD. Patient also 

reports partial numbness in left arm and nausea. No vomiting. No other acute 

symptoms or complaints. Symptoms began several hours prior to ED arrival.





Past Medical History


Past Medical History:  CAD, Diabetes-Type II, High Cholesterol, Hypertension


Past Surgical History:  Cholecystectomy


Alcohol Use:  None


Drug Use:  None








fhx hyperlipidemia


Cardiovascular:  HTN


Hepatobiliary:  No pertinent hx


Endocrine:  Diabetes





Family History


Family History





Past Medical History


Past Medical History


Hypertension


Hypercholesterolemia


Diabetes mellitus type 2





Past Surgical History


Past Surgical History


Cholecystectomy





Family History


Family History


Negative for premature coronary artery disease





Social History


Social History


Denied any smoking, alcohol or drug use


Family History:  High Cholestrol





Social History


Smoke:  No


ALCOHOL:  none


Drugs:  None





Current Medications


Current Medications





Current Medications


Aspirin (Ecotrin) 324 mg 1X  ONCE PO  Last administered on 8/19/19at 03:10;  

Start 8/19/19 at 03:30;  Stop 8/19/19 at 03:31;  Status DC


Ondansetron HCl (Zofran) 4 mg PRN Q8HRS  PRN IV NAUSEA/VOMITING 1ST CHOICE;  

Start 8/19/19 at 04:30;  Stop 8/20/19 at 04:29


Sodium Chloride 1,000 ml @  75 mls/hr [id number] IV  Last administered on 8/19/19at

04:41;  Start 8/19/19 at 05:00;  Stop 8/20/19 at 04:59


Lidocaine HCl (Xylocaine-Mpf 1% 2ml Vial) 2 ml STK-MED ONCE .ROUTE ;  Start 

8/19/19 at 09:30;  Stop 8/19/19 at 09:31;  Status DC


Iohexol (Omnipaque 300 Mg/ml) 100 ml STK-MED ONCE .ROUTE ;  Start 8/19/19 at 

09:30;  Stop 8/19/19 at 09:31;  Status DC


Heparin Sodium/ Sodium Chloride 500 ml @  As Directed STK-MED ONCE .ROUTE ;  

Start 8/19/19 at 09:30;  Stop 8/19/19 at 09:31;  Status DC


Fentanyl Citrate (Fentanyl 2ml Vial) 100 mcg STK-MED ONCE .ROUTE ;  Start 

8/19/19 at 09:48;  Stop 8/19/19 at 09:48;  Status DC


Midazolam HCl (Versed) 2 mg STK-MED ONCE .ROUTE ;  Start 8/19/19 at 09:48;  Stop

8/19/19 at 09:48;  Status DC


Heparin Sodium (Porcine) (Heparin Sodium) 10,000 unit STK-MED ONCE .ROUTE ;  

Start 8/19/19 at 09:48;  Stop 8/19/19 at 09:48;  Status DC


Verapamil HCl (Verapamil) 5 mg STK-MED ONCE .ROUTE ;  Start 8/19/19 at 09:48;  

Stop 8/19/19 at 09:48;  Status DC


Nitroglycerin (Nitroglycerin) 200 mcg STK-MED ONCE .ROUTE ;  Start 8/19/19 at 

09:48;  Stop 8/19/19 at 09:49;  Status DC


Iohexol (Omnipaque 300 Mg/ml) 100 ml STK-MED ONCE .ROUTE ;  Start 8/19/19 at 

10:24;  Stop 8/19/19 at 10:24;  Status DC





Active Scripts


Active


Reported


Novolog Flexpen (Insulin Aspart) 100 Unit/1 Ml Insuln.pen 10 Unit SQ DAILYWSUP


Novolog Flexpen (Insulin Aspart) 100 Unit/1 Ml Insuln.pen 5 Unit SQ DAILYWBKFT


Vitamin E (Vitamin E Acid Succinate) 100 Unit Tablet 100 Unit PO DAILY


Coq-10 (Ubidecarenone) 100 Mg Capsule 100 Mg PO DAILY


Lantus Solostar (Insulin Glargine,Hum.rec.anlog) 100 Unit/1 Ml Insuln.pen 17 

Unit SQ QHS


Caltrate 600 + D Soft Chew Tab (Calcium Carbonate/Vitamin D3) 1 Each Tab.chew 1 

Each PO DAILY


One Daily For Women 50+ Adv Tb (Multivitamins-Min/Fa/Ginkgo) 1 Each Tablet 1 

Each PO DAILY


Tylenol (Acetaminophen) 325 Mg Tablet 325 Mg PO DAILY


Aspirin Ec (Aspirin) 325 Mg Tablet.dr 325 Mg PO DAILY


Amlodipine Besylate 5 Mg Tablet 5 Mg PO DAILY


Zestril (Lisinopril) 10 Mg Tablet 10 Mg PO BID


Metformin Hcl 1,000 Mg Tablet 1,000 Mg PO BIDWMEALS





Allergies


Allergies:  


Coded Allergies:  


     No Known Drug Allergies (Unverified , 10/8/17)





ROS


Review of System


Review of Systems


Review of Systems


Review symptoms as per history of present illness. All other review symptoms are

negative.


General:  YES: Fatigue


PSYCHOLOGICAL ROS:  No: Anxiety, Behavioral Disorder, Concentration difficultie,

Decreased libido, Depression, Disorientation, Hallucinations, Hostility, 

Irritablity, Memory difficulties, Mood Swings, Obsessive thoughts, Physical 

abuse, Sexual abuse, Sleep disturbances, Suicidal ideation, Other


HEENT:  No: Heacaches, Visual Changes, Hearing change, Nasal congestion, Nasal 

discharge, Oral lesions, Sinus pain, Sore Throat, Epistaxis, Sneezing, Snoring, 

Tinnitus, Vertigo, Vocal changes, Other


ALLERGY AND IMMUNOLOGY:  No: Hives, Insect Bite Sensitivity, Itchy/Watery Eyes, 

Nasal Congestion, Post Nasal Drip, Seasonal Allergies, Other


Hematological and Lymphatic:  No: Bleeding Problems, Blood Clots, Blood 

Transfusions, Brusing, Night Sweats, Pallor, Swollen Lymph Nodes, Other


ENDOCRINE:  No: Breast Changes, Galactorrhea, Hair Pattern Changes, Hot Flashes,

Malaise/lethargy, Mood Swings, Palpitations, Polydipsia/polyuria, Skin Changes, 

Temperature Intolerance, Unexpected Weight Changes, Other


Respiratory:  No: Cough, Hemoptysis, Orthopnea, Pleuritic Pain, Shortness of 

breath, SOB with excertion, Sputum Changes, Stridor, Tachypnea, Wheezing, Other


Cardiovascular:  yes Paroxysmal Noc. Dyspnea


Gastrointestinal:  Yes Nausea


Musculoskeletal:  Yes Joint Stiffness


Neurological:  Yes Confusion, Yes Gait Disturbance





Physical Exam


Physical Exam





Physical Exam


Physical Exam





Constitutional: Well developed, well nourished, no acute distress, non-toxic 

appearance. []


HENT: Normocephalic, atraumatic, bilateral external ears normal, oropharynx 

moist, no oral exudates, nose normal. []


Eyes: PERRLA, EOMI, conjunctiva normal, no discharge. [] 


Neck: Normal range of motion, no tenderness, supple, no stridor. [] 


Cardiovascular:Heart rate regular rhythm, no murmur []


Lungs & Thorax:  Bilateral breath sounds clear to auscultation []


Abdomen: Bowel sounds normal, soft, no tenderness. [] 


Skin: Warm, dry. [] 


Back: No tenderness, no CVA tenderness. [] 


Extremities: No tenderness, no cyanosis, no clubbing, ROM intact, no edema. [] 


Neurologic: Alert and oriented X 3, renal nerves II through XII grossly intact, 

normal motor function, normal sensory function, no focal deficits noted. NIH of 

0.[]


Psychologic: Affect normal, judgement normal, mood normal. []


General:  No acute distress


Breasts:  Not examined


Abdomen:  Normal bowel sounds, Soft


Rectal Exam:  not examined


PELVIC:  Examination not indicated


Extremities:  No cyanosis


Neuro:  Cranial nerves 3-12 NL





Vitals


Vitals





Vital Signs








  Date Time  Temp Pulse Resp B/P (MAP) Pulse Ox O2 Delivery O2 Flow Rate FiO2


 


8/19/19 08:00      Room Air  


 


8/19/19 07:00 98.5 62 18 139/63 (88) 97   





 98.5       











Labs


Labs





Laboratory Tests








Test


 8/19/19


02:15 8/19/19


07:22 8/19/19


07:25


 


White Blood Count


 11.2 x10^3/uL


(4.0-11.0) 


 





 


Red Blood Count


 3.59 x10^6/uL


(3.50-5.40) 


 





 


Hemoglobin


 10.5 g/dL


(12.0-15.5) 


 





 


Hematocrit


 29.9 %


(36.0-47.0) 


 





 


Mean Corpuscular Volume 83 fL ()   


 


Mean Corpuscular Hemoglobin 29 pg (25-35)   


 


Mean Corpuscular Hemoglobin


Concent 35 g/dL


(31-37) 


 





 


Red Cell Distribution Width


 12.9 %


(11.5-14.5) 


 





 


Platelet Count


 297 x10^3/uL


(140-400) 


 





 


Neutrophils (%) (Auto) 83 % (31-73)   


 


Lymphocytes (%) (Auto) 9 % (24-48)   


 


Monocytes (%) (Auto) 7 % (0-9)   


 


Eosinophils (%) (Auto) 0 % (0-3)   


 


Basophils (%) (Auto) 0 % (0-3)   


 


Neutrophils # (Auto)


 9.4 x10^3/uL


(1.8-7.7) 


 





 


Lymphocytes # (Auto)


 1.0 x10^3/uL


(1.0-4.8) 


 





 


Monocytes # (Auto)


 0.8 x10^3/uL


(0.0-1.1) 


 





 


Eosinophils # (Auto)


 0.0 x10^3/uL


(0.0-0.7) 


 





 


Basophils # (Auto)


 0.0 x10^3/uL


(0.0-0.2) 


 





 


Sodium Level


 125 mmol/L


(136-145) 


 





 


Potassium Level


 4.3 mmol/L


(3.5-5.1) 


 





 


Chloride Level


 92 mmol/L


() 


 





 


Carbon Dioxide Level


 25 mmol/L


(21-32) 


 





 


Anion Gap 8 (6-14)   


 


Blood Urea Nitrogen


 18 mg/dL


(7-20) 


 





 


Creatinine


 1.0 mg/dL


(0.6-1.0) 


 





 


Estimated GFR


(Cockcroft-Gault) 52.3 


 


 





 


BUN/Creatinine Ratio 18 (6-20)   


 


Glucose Level


 103 mg/dL


(70-99) 


 





 


Calcium Level


 8.9 mg/dL


(8.5-10.1) 


 





 


Total Bilirubin


 0.5 mg/dL


(0.2-1.0) 


 





 


Aspartate Amino Transf


(AST/SGOT) 22 U/L (15-37) 


 


 





 


Alanine Aminotransferase


(ALT/SGPT) 22 U/L (14-59) 


 


 





 


Alkaline Phosphatase


 68 U/L


() 


 





 


Creatine Kinase


 137 U/L


() 


 





 


Troponin I Quantitative


 0.334 ng/mL


(0.000-0.055) 


 1.305 ng/mL


(0.000-0.055)


 


NT-Pro-B-Type Natriuretic


Peptide 857 pg/mL


(0-449) 


 





 


Total Protein


 7.1 g/dL


(6.4-8.2) 


 





 


Albumin


 3.4 g/dL


(3.4-5.0) 


 





 


Albumin/Globulin Ratio 0.9 (1.0-1.7)   


 


Thyroid Stimulating Hormone


(TSH) 5.277 uIU/mL


(0.358-3.74) 


 





 


Glucose (Fingerstick)


 


 89 mg/dL


(70-99) 











Laboratory Tests








Test


 8/19/19


02:15 8/19/19


07:22 8/19/19


07:25


 


White Blood Count


 11.2 x10^3/uL


(4.0-11.0) 


 





 


Red Blood Count


 3.59 x10^6/uL


(3.50-5.40) 


 





 


Hemoglobin


 10.5 g/dL


(12.0-15.5) 


 





 


Hematocrit


 29.9 %


(36.0-47.0) 


 





 


Mean Corpuscular Volume 83 fL ()   


 


Mean Corpuscular Hemoglobin 29 pg (25-35)   


 


Mean Corpuscular Hemoglobin


Concent 35 g/dL


(31-37) 


 





 


Red Cell Distribution Width


 12.9 %


(11.5-14.5) 


 





 


Platelet Count


 297 x10^3/uL


(140-400) 


 





 


Neutrophils (%) (Auto) 83 % (31-73)   


 


Lymphocytes (%) (Auto) 9 % (24-48)   


 


Monocytes (%) (Auto) 7 % (0-9)   


 


Eosinophils (%) (Auto) 0 % (0-3)   


 


Basophils (%) (Auto) 0 % (0-3)   


 


Neutrophils # (Auto)


 9.4 x10^3/uL


(1.8-7.7) 


 





 


Lymphocytes # (Auto)


 1.0 x10^3/uL


(1.0-4.8) 


 





 


Monocytes # (Auto)


 0.8 x10^3/uL


(0.0-1.1) 


 





 


Eosinophils # (Auto)


 0.0 x10^3/uL


(0.0-0.7) 


 





 


Basophils # (Auto)


 0.0 x10^3/uL


(0.0-0.2) 


 





 


Sodium Level


 125 mmol/L


(136-145) 


 





 


Potassium Level


 4.3 mmol/L


(3.5-5.1) 


 





 


Chloride Level


 92 mmol/L


() 


 





 


Carbon Dioxide Level


 25 mmol/L


(21-32) 


 





 


Anion Gap 8 (6-14)   


 


Blood Urea Nitrogen


 18 mg/dL


(7-20) 


 





 


Creatinine


 1.0 mg/dL


(0.6-1.0) 


 





 


Estimated GFR


(Cockcroft-Gault) 52.3 


 


 





 


BUN/Creatinine Ratio 18 (6-20)   


 


Glucose Level


 103 mg/dL


(70-99) 


 





 


Calcium Level


 8.9 mg/dL


(8.5-10.1) 


 





 


Total Bilirubin


 0.5 mg/dL


(0.2-1.0) 


 





 


Aspartate Amino Transf


(AST/SGOT) 22 U/L (15-37) 


 


 





 


Alanine Aminotransferase


(ALT/SGPT) 22 U/L (14-59) 


 


 





 


Alkaline Phosphatase


 68 U/L


() 


 





 


Creatine Kinase


 137 U/L


() 


 





 


Troponin I Quantitative


 0.334 ng/mL


(0.000-0.055) 


 1.305 ng/mL


(0.000-0.055)


 


NT-Pro-B-Type Natriuretic


Peptide 857 pg/mL


(0-449) 


 





 


Total Protein


 7.1 g/dL


(6.4-8.2) 


 





 


Albumin


 3.4 g/dL


(3.4-5.0) 


 





 


Albumin/Globulin Ratio 0.9 (1.0-1.7)   


 


Thyroid Stimulating Hormone


(TSH) 5.277 uIU/mL


(0.358-3.74) 


 





 


Glucose (Fingerstick)


 


 89 mg/dL


(70-99) 














Images


Images





TDI


Medial E' P. V   5.28cm/s   E/Medial E'   22.6





Tricuspid Valve


TR P. Velocity   238cm/s   RAP ESTIMATE   3mmHg


TR Peak Gr.   23mmHg   RVSP      26mmHg





 LEFT VENTRICLE 


The left ventricle is normal size. There is mild concentric left ventricular 

hypertrophy. The left ventricle is hyperdynamic. The Ejection Fraction is >65%. 

There is normal LV segmental wall motion. Transmitral Doppler flow pattern is 

abnormal.





 RIGHT VENTRICLE 


The right ventricle is normal size. There is normal right ventricular wall 

thickness. The right ventricular systolic function is normal.





 ATRIA 


The left atrium size is normal. The right atrium size is normal. The interatrial

 septum is intact with no evidence for an atrial septal defect or patent foramen

 ovale as noted on 2-D or Doppler imaging.





 AORTIC VALVE 


The aortic valve is mildly calcified. The aortic valve is trileaflet. Doppler 

and Color Flow revealed no significant aortic regurgitation. There is mild 

valvular aortic stenosis. Calculated aortic valve area is 1.4 cm2 with maximum 

pressure gradient of 14 mmHg and mean pressure gradient of 10 mmHg.





 MITRAL VALVE 


Mitral annular calcification is moderate. There is no evidence of mitral valve 

prolapse. There is no mitral valve stenosis. Doppler and Color-flow revealed 

trace to mild mitral regurgitation.





 TRICUSPID VALVE 


The tricuspid valve is normal in structure and function. Doppler and Color Flow 

revealed trace tricuspid regurgitation. The PA pressure was estimated at 26 

mmHg. There is no tricuspid valve prolapse or vegetation. There is no tricuspid 

valve stenosis.





 PULMONIC VALVE 


The pulmonic valve is not well visualized.





 GREAT VESSELS 


The aortic root is normal in size. The ascending aorta is normal in size. The 

IVC is normal in size and collapses >50% with inspiration.





 PERICARDIAL EFFUSION 


There is no evidence of significant pericardial effusion.





Critical Notification


Critical Value: No





<Conclusion>


The left ventricle is normal size.


The left ventricle is hyperdynamic.


The Ejection Fraction is >65%.


There is mild concentric left ventricular hypertrophy.


There is mild valvular aortic stenosis. 


Calculated aortic valve area is 1.4 cm2 with maximum pressure gradient of 14 

mmHg and mean pressure gradient of 10 mmHg.


Doppler and Color Flow revealed no significant aortic regurgitation.


Doppler and Color-flow revealed trace to mild mitral regurgitation.


Doppler and Color Flow revealed trace tricuspid regurgitation.


The PA pressure was estimated at 26 mmHg.





Signed by : Rodolfo dOell MD


Electronically Approved : 08/19/2019 13:21:40














DICTATED and SIGNED BY:     RODOLFO ODELL MD


DATE:     08/19/19 1313











PROCEDURE: CT HEAD WO CONTRAST





EXAM: CT HEAD WITHOUT CONTRAST.


 


HISTORY: Dizziness, vertigo.


 


TECHNIQUE: Computed tomography of the head was performed without 


intravenous contrast.


 


COMPARISON: None.


 


FINDINGS: There is no intracranial hemorrhage. Hypoattenuation within the 


white matter indicates moderate to severe chronic microangiopathic change.


Prominence of the lateral ventricles and hemispheric sulci indicates 


moderate atrophy.


 


The visualized paranasal sinuses appear clear. There are changes of 


bilateral cataract surgery. The temporal bones are unremarkable. The 


calvarium reveals no suspicious lesions. There are atherosclerotic 


calcifications of the internal carotid and vertebral arteries.


 


IMPRESSION:


1. No acute intracranial findings.


2. 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 


matter change.


 


 


*One or more of the following individualized dose reduction techniques 


were utilized for this examination:  


1. Automated exposure control.  


2. Adjustment of the mA and/or kV according to patient size.  


3. Use of iterative reconstruction technique.


 


Electronically signed by: TITA Martinez MD (8/19/2019 3:25 AM


Technologist: PASTORA BOSTON RTR


Nurse: Noni Anaya R.N.





Procedure(s) performed: Left heart catheterization, selective coronary 

angiography and left ventriculography via right transradial approach





MODERATE SEDATION TIME: 31 MINUTES


FLUORO TIME: 4.1 MIN


DOSE: 24.1 GYCM2


CONTRAST: 94





INDICATION


The indication(s) include : non-STEMI .





CSHA Clinical Frailty Scale


HA Clinical Frailty Scale: Mildly Frail





Heart Failure


Heart Failure: No





PROCEDURE NARRATIVE


After explaining the risks, benefits and alternative options, informed consent 

was obtained from patient.  Patient was brought to the cardiac Cath Lab and 

right wrist was prepped and draped in the usual fashion after confirming a 

positive modified Charan's test.  Arterial access was obtained in the right 

radial artery and a 6 Martiniquais sheath was inserted.  6 Martiniquais Leonardo catheter and 

6 Martiniquais JL 3.5 catheter were used to perform selective angiography of the right

 and left coronary arteries.  6 Martiniquais pigtail catheter was used to perform left

 ventriculography.  Patient tolerated the procedure well.  Hemostasis was 

achieved using TR band.  There were no immediate complications.  The following 

findings were noted.





FINDINGS


1.  Hemodynamics: Left ventricular end-diastolic pressure of 23 mmHg.  No 

pullback gradient across the aortic valve.


2.  Left ventriculography: Hyperdynamic left ventricle systolic function with 

ejection fraction estimated at 80%.  No significant mitral regurgitation seen.


3.  Coronary angiography:


a.  The left main coronary artery arose from the left sinus of Valsalva, gave 

rise to the left anterior descending and left circumflex arteries and did not 

show any significant stenosis.


b.  The left anterior descending artery showed 30% stenosis in the midsegment.


c.  The left circumflex artery did not show any significant stenosis.


d.  The right coronary artery was a large and dominant vessel arising from the 

right sinus of Valsalva that did showed 40% stenosis in the posterior descending

 branch.





Conclusion


1.  Nonobstructive coronary artery disease


2.  Hyperdynamic left ventricle systolic function with ejection fraction 

estimated at 80%





Recommendations


Patient's non-STEMI is most probably type 2/demand ischemia. Recommend medical 

management.





VTE Prophylaxis Ordered


VTE Prophylaxis Devices:  No


VTE Pharmacological Prophylaxi:  Yes





Assessment/Plan


Assessment/Plan





impression





1.  Nonobstructive coronary artery disease


2.  Hyperdynamic left ventricle systolic function with ejection fraction 

estimated at 80%


3. non-STEMI is most probably type 2/demand ischemia. 


4. 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 

white 


matter change.


5. diabetes


6. hypertension


7. dizziness


8. dyspnea


9. on echo Ejection Fraction is >65%.


10.There is mild concentric left ventricular hypertrophy.


11. mild valvular aortic stenosis. 


12.  shortness of breath //probably anginal equivalent. Chest x-ray did not show

 any evidence of congestive heart failure.





plan





admit cvc 


cardiac cath 


medical management.


accuchecks


risk reduction mgt


cardiology consulted


pt/ot











78 min pt exam, chart review, > 50% of time spent with exam, chart review, pt 

care coordination











SOFIA PAUL MD          Aug 19, 2019 10:36

## 2019-08-19 NOTE — RAD
EXAM: CHEST ONE VIEW.

 

HISTORY: Chest pain.

 

COMPARISON: None.

 

FINDINGS: A frontal view of the chest is obtained.

 

There are no confluent infiltrates. The lungs are expanded to the 11th 

posterior interspaces. There is no pneumothorax or pleural effusion. The 

heart is not enlarged. There are atherosclerotic calcifications of the 

aorta.

 

IMPRESSION: 

1. Hyperinflation. Correlate for chronic obstructive pulmonary disease. No

confluent infiltrates.

 

Electronically signed by: TITA Martinez MD (8/19/2019 5:07 AM) 

[facility]

## 2019-08-19 NOTE — EKG
[facility]

              [address] [place] [address]

Test Date:    2019               Test Time:    02:06:06

Pat Name:     OSCAR VILLAFANA           Department:   

Patient ID:   [id number]           Room:         260 1

Gender:       F                        Technician:   

:          [id number]               Requested By: RODOLFO SARAH

Order Number: [id number]           Reading MD:   Fred Coronel MD

                                 Measurements

Intervals                              Axis          

Rate:         72                       P:            151

UT:           182                      QRS:          -146

QRSD:         88                       T:            130

QT:           392                                    

QTc:          435                                    

                           Interpretive Statements

SINUS RHYTHM

LIMB LEAD MISPLACEMENT

Electronically Signed On 2019 9:52:34 CDT by Fred Coronel MD

## 2019-08-20 VITALS — SYSTOLIC BLOOD PRESSURE: 148 MMHG | DIASTOLIC BLOOD PRESSURE: 67 MMHG

## 2019-08-20 VITALS — SYSTOLIC BLOOD PRESSURE: 154 MMHG | DIASTOLIC BLOOD PRESSURE: 64 MMHG

## 2019-08-20 VITALS — DIASTOLIC BLOOD PRESSURE: 66 MMHG | SYSTOLIC BLOOD PRESSURE: 149 MMHG

## 2019-08-20 VITALS — DIASTOLIC BLOOD PRESSURE: 67 MMHG | SYSTOLIC BLOOD PRESSURE: 148 MMHG

## 2019-08-20 VITALS — SYSTOLIC BLOOD PRESSURE: 146 MMHG | DIASTOLIC BLOOD PRESSURE: 67 MMHG

## 2019-08-20 VITALS — SYSTOLIC BLOOD PRESSURE: 149 MMHG | DIASTOLIC BLOOD PRESSURE: 67 MMHG

## 2019-08-20 LAB
ANION GAP SERPL CALC-SCNC: 9 MMOL/L (ref 6–14)
BASOPHILS # BLD AUTO: 0 X10^3/UL (ref 0–0.2)
BASOPHILS NFR BLD: 0 % (ref 0–3)
BUN SERPL-MCNC: 15 MG/DL (ref 7–20)
CALCIUM SERPL-MCNC: 8.8 MG/DL (ref 8.5–10.1)
CHLORIDE SERPL-SCNC: 96 MMOL/L (ref 98–107)
CO2 SERPL-SCNC: 25 MMOL/L (ref 21–32)
CREAT SERPL-MCNC: 0.9 MG/DL (ref 0.6–1)
EOSINOPHIL NFR BLD: 0.1 X10^3/UL (ref 0–0.7)
EOSINOPHIL NFR BLD: 1 % (ref 0–3)
ERYTHROCYTE [DISTWIDTH] IN BLOOD BY AUTOMATED COUNT: 13.3 % (ref 11.5–14.5)
GFR SERPLBLD BASED ON 1.73 SQ M-ARVRAT: 59.1 ML/MIN
GLUCOSE SERPL-MCNC: 67 MG/DL (ref 70–99)
HCT VFR BLD CALC: 31.3 % (ref 36–47)
HGB BLD-MCNC: 10.8 G/DL (ref 12–15.5)
LYMPHOCYTES # BLD: 2 X10^3/UL (ref 1–4.8)
LYMPHOCYTES NFR BLD AUTO: 20 % (ref 24–48)
MCH RBC QN AUTO: 29 PG (ref 25–35)
MCHC RBC AUTO-ENTMCNC: 34 G/DL (ref 31–37)
MCV RBC AUTO: 84 FL (ref 79–100)
MONO #: 0.9 X10^3/UL (ref 0–1.1)
MONOCYTES NFR BLD: 9 % (ref 0–9)
NEUT #: 7 X10^3/UL (ref 1.8–7.7)
NEUTROPHILS NFR BLD AUTO: 69 % (ref 31–73)
PLATELET # BLD AUTO: 308 X10^3/UL (ref 140–400)
POTASSIUM SERPL-SCNC: 4 MMOL/L (ref 3.5–5.1)
RBC # BLD AUTO: 3.72 X10^6/UL (ref 3.5–5.4)
SODIUM SERPL-SCNC: 130 MMOL/L (ref 136–145)
WBC # BLD AUTO: 10.1 X10^3/UL (ref 4–11)

## 2019-08-20 RX ADMIN — ASPIRIN SCH MG: 325 TABLET, DELAYED RELEASE ORAL at 09:35

## 2019-08-20 RX ADMIN — ACETAMINOPHEN SCH MG: 325 TABLET, FILM COATED ORAL at 09:00

## 2019-08-20 RX ADMIN — INSULIN LISPRO SCH UNITS: 100 INJECTION, SOLUTION INTRAVENOUS; SUBCUTANEOUS at 17:59

## 2019-08-20 RX ADMIN — INSULIN LISPRO SCH UNITS: 100 INJECTION, SOLUTION INTRAVENOUS; SUBCUTANEOUS at 18:01

## 2019-08-20 RX ADMIN — LISINOPRIL SCH MG: 10 TABLET ORAL at 09:38

## 2019-08-20 RX ADMIN — Medication SCH UNIT: at 09:34

## 2019-08-20 RX ADMIN — INSULIN LISPRO SCH UNITS: 100 INJECTION, SOLUTION INTRAVENOUS; SUBCUTANEOUS at 08:00

## 2019-08-20 RX ADMIN — CALCIUM CARBONATE-VITAMIN D TAB 500 MG-200 UNIT SCH TAB: 500-200 TAB at 09:34

## 2019-08-20 RX ADMIN — INSULIN LISPRO SCH UNITS: 100 INJECTION, SOLUTION INTRAVENOUS; SUBCUTANEOUS at 12:15

## 2019-08-20 RX ADMIN — MULTIPLE VITAMINS W/ MINERALS TAB SCH TAB: TAB at 09:34

## 2019-08-20 RX ADMIN — INSULIN GLARGINE SCH UNITS: 100 INJECTION, SOLUTION SUBCUTANEOUS at 21:19

## 2019-08-20 RX ADMIN — SODIUM CHLORIDE SCH MLS/HR: 450 INJECTION, SOLUTION INTRAVENOUS at 12:57

## 2019-08-20 RX ADMIN — SODIUM CHLORIDE SCH MLS/HR: 450 INJECTION, SOLUTION INTRAVENOUS at 06:49

## 2019-08-20 RX ADMIN — LISINOPRIL SCH MG: 10 TABLET ORAL at 21:13

## 2019-08-20 NOTE — RAD
EXAM: Carotid Doppler sonogram.

 

HISTORY: Cerebral infarction.

 

TECHNIQUE: Gray scale and color Doppler sonographic evaluation of the neck

with spectral waveform analysis was performed and static images are 

submitted for review. 

 

FINDINGS: The peak systolic velocity within the right common carotid 

artery is 75 cm/sec. The peak systolic velocity within the right internal 

carotid artery is 111 cm/sec and the end diastolic velocity within the 

right internal carotid artery is 27 cm/sec. The right ICA/CCA ratio is 

1.50.

 

The peak systolic velocity within the left common carotid artery is 1 

heart and 14 cm/sec. The peak systolic velocity within the left internal 

carotid artery is 98 cm/sec and the end diastolic velocity within the left

internal carotid artery is 20 cm/sec. The left ICA/CCA ratio is 1.05.

 

There is normal antegrade flow within both vertebral arteries.

 

IMPRESSION: No Doppler evidence of hemodynamically significant stenosis 

within the carotid or vertebral arteries.

 

 

PQRS Compliance Statement - Stenosis calculations for CT, MR and 

conventional angiography are based upon measurement of the distal ICA 

diameter in accordance with the NASCET methodology.  Stenosis calculations

for carotid ultrasound studies are derived from validated velocity 

criteria which are known to correlate with the NASCET methodology.

 

Electronically signed by: Cece Devine MD (8/20/2019 3:45 PM) [place] [id number]

## 2019-08-20 NOTE — EEG
DATE OF SERVICE:  08/20/2019



OBJECTIVE:  The patient is an 88-year-old female with a loss of consciousness

and some confusion.



DESCRIPTION:  This is a digital study.  Electrodes are placed according to the

international 10-20 system.  Bipolar and referential montages are available. 

Activation procedures typically include hyperventilation and intermittent photic

stimulation.



INTERPRETATION:  The waking background consists of 8-9 Hz,  microvolt

activity, symmetrically distributed over parietooccipital regions and reactive

to eye opening.  Hyperventilation and intermittent photic stimulation are

noncontributory.  Stage 1 sleep is achieved with normal electroencephalogram

patterns.



IMPRESSION:  This electroencephalogram with the patient awake and asleep is

within normal limits.  There is no focal, paroxysmal, or epileptiform activity.



Thank you for letting us help with the patient's care.

 



______________________________

JEAN BUTLER MD



DR:  JESSICA/topher  JOB#:  [id number] / [id number]

DD:  08/20/2019 10:23  DT:  08/20/2019 10:36



PJ Sarmiento MD

## 2019-08-20 NOTE — PDOC
PROGRESS NOTES


History of Present Illness


History of Present Illness





VTE Prophylaxis Ordered


VTE Prophylaxis Devices:  No


VTE Pharmacological Prophylaxi:  Yes





Assessment/Plan


Assessment/Plan





impression





1.  Nonobstructive coronary artery disease


2.  Hyperdynamic left ventricle systolic function with ejection fraction 

estimated at 80%


3. non-STEMI is most probably type 2/demand ischemia. 


4. 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 

white 


matter change.


5. diabetes


6. hypertension


7. dizziness


8. dyspnea


9. on echo Ejection Fraction is >65%.


10.There is mild concentric left ventricular hypertrophy.


11. mild valvular aortic stenosis. 


by echo . 


Calculated aortic valve area is 1.4 cm2 with maximum pressure gradient of 14 mm

Hg and mean pressure gradient of 10 mmHg.


12.  shortness of breath //probably anginal equivalent. Chest x-ray did not show

any evidence of congestive heart failure.


13.  new onset confusion, memory loss, metabolic encephalopathy


14. component of hypertensive encephalopathy, possible concussion





plan





admit cvc 


cardiac cath 


medical management.


accuchecks


risk reduction mgt


cardiology consulted


pt/ot


neurology consult


mri head 











36 min pt exam, chart review, > 50% of time spent with exam, chart review, pt 

care coordination





Vitals


Vitals





Vital Signs








  Date Time  Temp Pulse Resp B/P (MAP) Pulse Ox O2 Delivery O2 Flow Rate FiO2


 


8/20/19 10:49   14  97 Room Air  


 


8/20/19 09:42  64  149/66    


 


8/20/19 07:00 98.6       





 98.6       


 


8/19/19 10:54       2.0 











Physical Exam


General:  Alert, Cooperative, No acute distress


Heart:  Regular rate, Normal S1, Other (ESM LUPSB)


Lungs:  Clear


Abdomen:  Normal bowel sounds, Soft, No tenderness


Extremities:  No cyanosis


Skin:  No significant lesion





Labs


LABS





OBJECTIVE:  The patient is an 88-year-old female with a loss of consciousness


and some confusion.





DESCRIPTION:  This is a digital study.  Electrodes are placed according to the


international 10-20 system.  Bipolar and referential montages are available. 


Activation procedures typically include hyperventilation and intermittent photic


stimulation.





INTERPRETATION:  The waking background consists of 8-9 Hz,  microvolt


activity, symmetrically distributed over parietooccipital regions and reactive


to eye opening.  Hyperventilation and intermittent photic stimulation are


noncontributory.  Stage 1 sleep is achieved with normal electroencephalogram


patterns.





IMPRESSION:  This electroencephalogram with the patient awake and asleep is


within normal limits.  There is no focal, paroxysmal, or epileptiform activity.





Thank you for letting us help with the patient's care.


 





______________________________


JEAN BUTLER MD





DR:  JESSICA/topher  JOB#:  [id number] / [id number]


Laboratory Tests








Test


 8/19/19


12:30 8/19/19


17:22 8/19/19


20:10 8/19/19


21:30


 


Glucose (Fingerstick)


 96 mg/dL


(70-99) 135 mg/dL


(70-99) 85 mg/dL


(70-99) 





 


Urine Collection Type    Unknown 


 


Urine Color    Straw 


 


Urine Clarity    Clear 


 


Urine pH    6.5 


 


Urine Specific Gravity    1.010 


 


Urine Protein


 


 


 


 30 mg/dL


(NEG-TRACE)


 


Urine Glucose (UA)


 


 


 


 Negative mg/dL


(NEG)


 


Urine Ketones (Stick)


 


 


 


 Negative mg/dL


(NEG)


 


Urine Blood    Negative (NEG) 


 


Urine Nitrite    Negative (NEG) 


 


Urine Bilirubin    Negative (NEG) 


 


Urine Urobilinogen Dipstick


 


 


 


 0.2 mg/dL (0.2


mg/dL)


 


Urine Leukocyte Esterase    Negative (NEG) 


 


Urine RBC    0 /HPF (0-2) 


 


Urine WBC


 


 


 


 Rare /HPF


(0-4)


 


Urine Squamous Epithelial


Cells 


 


 


 Few /LPF 





 


Urine Bacteria


 


 


 


 Few /HPF


(0-FEW)


 


Test


 8/20/19


06:15 8/20/19


06:25 8/20/19


07:22 





 


Sodium Level


 130 mmol/L


(136-145) 


 


 





 


Potassium Level


 4.0 mmol/L


(3.5-5.1) 


 


 





 


Chloride Level


 96 mmol/L


() 


 


 





 


Carbon Dioxide Level


 25 mmol/L


(21-32) 


 


 





 


Anion Gap 9 (6-14)    


 


Blood Urea Nitrogen


 15 mg/dL


(7-20) 


 


 





 


Creatinine


 0.9 mg/dL


(0.6-1.0) 


 


 





 


Estimated GFR


(Cockcroft-Gault) 59.1 


 


 


 





 


Glucose Level


 67 mg/dL


(70-99) 


 


 





 


Calcium Level


 8.8 mg/dL


(8.5-10.1) 


 


 





 


White Blood Count


 


 10.1 x10^3/uL


(4.0-11.0) 


 





 


Red Blood Count


 


 3.72 x10^6/uL


(3.50-5.40) 


 





 


Hemoglobin


 


 10.8 g/dL


(12.0-15.5) 


 





 


Hematocrit


 


 31.3 %


(36.0-47.0) 


 





 


Mean Corpuscular Volume  84 fL ()   


 


Mean Corpuscular Hemoglobin  29 pg (25-35)   


 


Mean Corpuscular Hemoglobin


Concent 


 34 g/dL


(31-37) 


 





 


Red Cell Distribution Width


 


 13.3 %


(11.5-14.5) 


 





 


Platelet Count


 


 308 x10^3/uL


(140-400) 


 





 


Neutrophils (%) (Auto)  69 % (31-73)   


 


Lymphocytes (%) (Auto)  20 % (24-48)   


 


Monocytes (%) (Auto)  9 % (0-9)   


 


Eosinophils (%) (Auto)  1 % (0-3)   


 


Basophils (%) (Auto)  0 % (0-3)   


 


Neutrophils # (Auto)


 


 7.0 x10^3/uL


(1.8-7.7) 


 





 


Lymphocytes # (Auto)


 


 2.0 x10^3/uL


(1.0-4.8) 


 





 


Monocytes # (Auto)


 


 0.9 x10^3/uL


(0.0-1.1) 


 





 


Eosinophils # (Auto)


 


 0.1 x10^3/uL


(0.0-0.7) 


 





 


Basophils # (Auto)


 


 0.0 x10^3/uL


(0.0-0.2) 


 





 


Glucose (Fingerstick)


 


 


 74 mg/dL


(70-99) 














Assessment and Plan


Assessmemt and Plan





Past Medical History


Cardiovascular:  Syncope, Other (heart murmur)


Psych:  Anxiety, Depression


Musculoskeletal:   low back pain, Osteoarthritis


Endocrine:  Diabetes





Past Surgical History


Past Surgical History:  Cholecystectomy, Cataract Removal





Family History


Family History:  No pertinent hx





Social History


Social History


, no tobacco or alcohol, retired





Comment


Review of Relevant


I have reviewed the following items lorenzo (where applicable) has been applied.


Labs





Laboratory Tests








Test


 8/19/19


02:15 8/19/19


07:22 8/19/19


07:25 8/19/19


10:00


 


White Blood Count


 11.2 x10^3/uL


(4.0-11.0) 


 


 





 


Red Blood Count


 3.59 x10^6/uL


(3.50-5.40) 


 


 





 


Hemoglobin


 10.5 g/dL


(12.0-15.5) 


 


 





 


Hematocrit


 29.9 %


(36.0-47.0) 


 


 





 


Mean Corpuscular Volume 83 fL ()    


 


Mean Corpuscular Hemoglobin 29 pg (25-35)    


 


Mean Corpuscular Hemoglobin


Concent 35 g/dL


(31-37) 


 


 





 


Red Cell Distribution Width


 12.9 %


(11.5-14.5) 


 


 





 


Platelet Count


 297 x10^3/uL


(140-400) 


 


 





 


Neutrophils (%) (Auto) 83 % (31-73)    


 


Lymphocytes (%) (Auto) 9 % (24-48)    


 


Monocytes (%) (Auto) 7 % (0-9)    


 


Eosinophils (%) (Auto) 0 % (0-3)    


 


Basophils (%) (Auto) 0 % (0-3)    


 


Neutrophils # (Auto)


 9.4 x10^3/uL


(1.8-7.7) 


 


 





 


Lymphocytes # (Auto)


 1.0 x10^3/uL


(1.0-4.8) 


 


 





 


Monocytes # (Auto)


 0.8 x10^3/uL


(0.0-1.1) 


 


 





 


Eosinophils # (Auto)


 0.0 x10^3/uL


(0.0-0.7) 


 


 





 


Basophils # (Auto)


 0.0 x10^3/uL


(0.0-0.2) 


 


 





 


Sodium Level


 125 mmol/L


(136-145) 


 


 





 


Potassium Level


 4.3 mmol/L


(3.5-5.1) 


 


 





 


Chloride Level


 92 mmol/L


() 


 


 





 


Carbon Dioxide Level


 25 mmol/L


(21-32) 


 


 





 


Anion Gap 8 (6-14)    


 


Blood Urea Nitrogen


 18 mg/dL


(7-20) 


 


 





 


Creatinine


 1.0 mg/dL


(0.6-1.0) 


 


 





 


Estimated GFR


(Cockcroft-Gault) 52.3 


 


 


 





 


BUN/Creatinine Ratio 18 (6-20)    


 


Glucose Level


 103 mg/dL


(70-99) 


 


 





 


Calcium Level


 8.9 mg/dL


(8.5-10.1) 


 


 





 


Total Bilirubin


 0.5 mg/dL


(0.2-1.0) 


 


 





 


Aspartate Amino Transf


(AST/SGOT) 22 U/L (15-37) 


 


 


 





 


Alanine Aminotransferase


(ALT/SGPT) 22 U/L (14-59) 


 


 


 





 


Alkaline Phosphatase


 68 U/L


() 


 


 





 


Creatine Kinase


 137 U/L


() 


 


 





 


Troponin I Quantitative


 0.334 ng/mL


(0.000-0.055) 


 1.305 ng/mL


(0.000-0.055) 1.506 ng/mL


(0.000-0.055)


 


NT-Pro-B-Type Natriuretic


Peptide 857 pg/mL


(0-449) 


 


 





 


Total Protein


 7.1 g/dL


(6.4-8.2) 


 


 





 


Albumin


 3.4 g/dL


(3.4-5.0) 


 


 





 


Albumin/Globulin Ratio 0.9 (1.0-1.7)    


 


Thyroid Stimulating Hormone


(TSH) 5.277 uIU/mL


(0.358-3.74) 


 


 





 


Glucose (Fingerstick)


 


 89 mg/dL


(70-99) 


 





 


Test


 8/19/19


12:30 8/19/19


17:22 8/19/19


20:10 8/19/19


21:30


 


Glucose (Fingerstick)


 96 mg/dL


(70-99) 135 mg/dL


(70-99) 85 mg/dL


(70-99) 





 


Urine Collection Type    Unknown 


 


Urine Color    Straw 


 


Urine Clarity    Clear 


 


Urine pH    6.5 


 


Urine Specific Gravity    1.010 


 


Urine Protein


 


 


 


 30 mg/dL


(NEG-TRACE)


 


Urine Glucose (UA)


 


 


 


 Negative mg/dL


(NEG)


 


Urine Ketones (Stick)


 


 


 


 Negative mg/dL


(NEG)


 


Urine Blood    Negative (NEG) 


 


Urine Nitrite    Negative (NEG) 


 


Urine Bilirubin    Negative (NEG) 


 


Urine Urobilinogen Dipstick


 


 


 


 0.2 mg/dL (0.2


mg/dL)


 


Urine Leukocyte Esterase    Negative (NEG) 


 


Urine RBC    0 /HPF (0-2) 


 


Urine WBC


 


 


 


 Rare /HPF


(0-4)


 


Urine Squamous Epithelial


Cells 


 


 


 Few /LPF 





 


Urine Bacteria


 


 


 


 Few /HPF


(0-FEW)


 


Test


 8/20/19


06:15 8/20/19


06:25 8/20/19


07:22 





 


Sodium Level


 130 mmol/L


(136-145) 


 


 





 


Potassium Level


 4.0 mmol/L


(3.5-5.1) 


 


 





 


Chloride Level


 96 mmol/L


() 


 


 





 


Carbon Dioxide Level


 25 mmol/L


(21-32) 


 


 





 


Anion Gap 9 (6-14)    


 


Blood Urea Nitrogen


 15 mg/dL


(7-20) 


 


 





 


Creatinine


 0.9 mg/dL


(0.6-1.0) 


 


 





 


Estimated GFR


(Cockcroft-Gault) 59.1 


 


 


 





 


Glucose Level


 67 mg/dL


(70-99) 


 


 





 


Calcium Level


 8.8 mg/dL


(8.5-10.1) 


 


 





 


White Blood Count


 


 10.1 x10^3/uL


(4.0-11.0) 


 





 


Red Blood Count


 


 3.72 x10^6/uL


(3.50-5.40) 


 





 


Hemoglobin


 


 10.8 g/dL


(12.0-15.5) 


 





 


Hematocrit


 


 31.3 %


(36.0-47.0) 


 





 


Mean Corpuscular Volume  84 fL ()   


 


Mean Corpuscular Hemoglobin  29 pg (25-35)   


 


Mean Corpuscular Hemoglobin


Concent 


 34 g/dL


(31-37) 


 





 


Red Cell Distribution Width


 


 13.3 %


(11.5-14.5) 


 





 


Platelet Count


 


 308 x10^3/uL


(140-400) 


 





 


Neutrophils (%) (Auto)  69 % (31-73)   


 


Lymphocytes (%) (Auto)  20 % (24-48)   


 


Monocytes (%) (Auto)  9 % (0-9)   


 


Eosinophils (%) (Auto)  1 % (0-3)   


 


Basophils (%) (Auto)  0 % (0-3)   


 


Neutrophils # (Auto)


 


 7.0 x10^3/uL


(1.8-7.7) 


 





 


Lymphocytes # (Auto)


 


 2.0 x10^3/uL


(1.0-4.8) 


 





 


Monocytes # (Auto)


 


 0.9 x10^3/uL


(0.0-1.1) 


 





 


Eosinophils # (Auto)


 


 0.1 x10^3/uL


(0.0-0.7) 


 





 


Basophils # (Auto)


 


 0.0 x10^3/uL


(0.0-0.2) 


 





 


Glucose (Fingerstick)


 


 


 74 mg/dL


(70-99) 











Laboratory Tests








Test


 8/19/19


12:30 8/19/19


17:22 8/19/19


20:10 8/19/19


21:30


 


Glucose (Fingerstick)


 96 mg/dL


(70-99) 135 mg/dL


(70-99) 85 mg/dL


(70-99) 





 


Urine Collection Type    Unknown 


 


Urine Color    Straw 


 


Urine Clarity    Clear 


 


Urine pH    6.5 


 


Urine Specific Gravity    1.010 


 


Urine Protein


 


 


 


 30 mg/dL


(NEG-TRACE)


 


Urine Glucose (UA)


 


 


 


 Negative mg/dL


(NEG)


 


Urine Ketones (Stick)


 


 


 


 Negative mg/dL


(NEG)


 


Urine Blood    Negative (NEG) 


 


Urine Nitrite    Negative (NEG) 


 


Urine Bilirubin    Negative (NEG) 


 


Urine Urobilinogen Dipstick


 


 


 


 0.2 mg/dL (0.2


mg/dL)


 


Urine Leukocyte Esterase    Negative (NEG) 


 


Urine RBC    0 /HPF (0-2) 


 


Urine WBC


 


 


 


 Rare /HPF


(0-4)


 


Urine Squamous Epithelial


Cells 


 


 


 Few /LPF 





 


Urine Bacteria


 


 


 


 Few /HPF


(0-FEW)


 


Test


 8/20/19


06:15 8/20/19


06:25 8/20/19


07:22 





 


Sodium Level


 130 mmol/L


(136-145) 


 


 





 


Potassium Level


 4.0 mmol/L


(3.5-5.1) 


 


 





 


Chloride Level


 96 mmol/L


() 


 


 





 


Carbon Dioxide Level


 25 mmol/L


(21-32) 


 


 





 


Anion Gap 9 (6-14)    


 


Blood Urea Nitrogen


 15 mg/dL


(7-20) 


 


 





 


Creatinine


 0.9 mg/dL


(0.6-1.0) 


 


 





 


Estimated GFR


(Cockcroft-Gault) 59.1 


 


 


 





 


Glucose Level


 67 mg/dL


(70-99) 


 


 





 


Calcium Level


 8.8 mg/dL


(8.5-10.1) 


 


 





 


White Blood Count


 


 10.1 x10^3/uL


(4.0-11.0) 


 





 


Red Blood Count


 


 3.72 x10^6/uL


(3.50-5.40) 


 





 


Hemoglobin


 


 10.8 g/dL


(12.0-15.5) 


 





 


Hematocrit


 


 31.3 %


(36.0-47.0) 


 





 


Mean Corpuscular Volume  84 fL ()   


 


Mean Corpuscular Hemoglobin  29 pg (25-35)   


 


Mean Corpuscular Hemoglobin


Concent 


 34 g/dL


(31-37) 


 





 


Red Cell Distribution Width


 


 13.3 %


(11.5-14.5) 


 





 


Platelet Count


 


 308 x10^3/uL


(140-400) 


 





 


Neutrophils (%) (Auto)  69 % (31-73)   


 


Lymphocytes (%) (Auto)  20 % (24-48)   


 


Monocytes (%) (Auto)  9 % (0-9)   


 


Eosinophils (%) (Auto)  1 % (0-3)   


 


Basophils (%) (Auto)  0 % (0-3)   


 


Neutrophils # (Auto)


 


 7.0 x10^3/uL


(1.8-7.7) 


 





 


Lymphocytes # (Auto)


 


 2.0 x10^3/uL


(1.0-4.8) 


 





 


Monocytes # (Auto)


 


 0.9 x10^3/uL


(0.0-1.1) 


 





 


Eosinophils # (Auto)


 


 0.1 x10^3/uL


(0.0-0.7) 


 





 


Basophils # (Auto)


 


 0.0 x10^3/uL


(0.0-0.2) 


 





 


Glucose (Fingerstick)


 


 


 74 mg/dL


(70-99) 











Medications





Current Medications


Aspirin (Ecotrin) 324 mg 1X  ONCE PO  Last administered on 8/19/19at 03:10;  

Start 8/19/19 at 03:30;  Stop 8/19/19 at 03:31;  Status DC


Ondansetron HCl (Zofran) 4 mg PRN Q8HRS  PRN IV NAUSEA/VOMITING 1ST CHOICE;  

Start 8/19/19 at 04:30;  Stop 8/20/19 at 04:29;  Status DC


Sodium Chloride 1,000 ml @  75 mls/hr [id number] IV  Last administered on 8/19/19at

04:41;  Start 8/19/19 at 05:00;  Stop 8/20/19 at 04:59;  Status DC


Lidocaine HCl (Xylocaine-Mpf 1% 2ml Vial) 2 ml STK-MED ONCE .ROUTE ;  Start 

8/19/19 at 09:30;  Stop 8/19/19 at 09:31;  Status DC


Iohexol (Omnipaque 300 Mg/ml) 100 ml STK-MED ONCE .ROUTE ;  Start 8/19/19 at 

09:30;  Stop 8/19/19 at 09:31;  Status DC


Heparin Sodium/ Sodium Chloride 500 ml @  As Directed STK-MED ONCE .ROUTE ;  

Start 8/19/19 at 09:30;  Stop 8/19/19 at 09:31;  Status DC


Fentanyl Citrate (Fentanyl 2ml Vial) 100 mcg STK-MED ONCE .ROUTE ;  Start 

8/19/19 at 09:48;  Stop 8/19/19 at 09:48;  Status DC


Midazolam HCl (Versed) 2 mg STK-MED ONCE .ROUTE ;  Start 8/19/19 at 09:48;  Stop

8/19/19 at 09:48;  Status DC


Heparin Sodium (Porcine) (Heparin Sodium) 10,000 unit STK-MED ONCE .ROUTE ;  

Start 8/19/19 at 09:48;  Stop 8/19/19 at 09:48;  Status DC


Verapamil HCl (Verapamil) 5 mg STK-MED ONCE .ROUTE ;  Start 8/19/19 at 09:48;  

Stop 8/19/19 at 09:48;  Status DC


Nitroglycerin (Nitroglycerin) 200 mcg STK-MED ONCE .ROUTE ;  Start 8/19/19 at 

09:48;  Stop 8/19/19 at 09:49;  Status DC


Iohexol (Omnipaque 300 Mg/ml) 100 ml STK-MED ONCE .ROUTE ;  Start 8/19/19 at 

10:24;  Stop 8/19/19 at 10:24;  Status DC


Nitroglycerin (Nitroglycerin) 200 mcg 1X  ONCE IART  Last administered on 

8/19/19at 10:54;  Start 8/19/19 at 10:45;  Stop 8/19/19 at 10:46;  Status DC


Verapamil HCl (Verapamil) 2.5 mg 1X  ONCE IART  Last administered on 8/19/19at 

10:54;  Start 8/19/19 at 10:45;  Stop 8/19/19 at 10:46;  Status DC


Heparin Sodium (Porcine) (Heparin Sodium) 2,500 unit 1X  ONCE IART  Last 

administered on 8/19/19at 10:54;  Start 8/19/19 at 10:45;  Stop 8/19/19 at 

10:46;  Status DC


Heparin Sodium/ Sodium Chloride (HEPARIN for ARTERIAL LINE FLUSH) 1,000 unit 1X 

ONCE IART  Last administered on 8/19/19at 10:54;  Start 8/19/19 at 10:45;  Stop 

8/19/19 at 10:46;  Status DC


Midazolam HCl (Versed) 2 mg 1X  ONCE IV  Last administered on 8/19/19at 10:54;  

Start 8/19/19 at 10:45;  Stop 8/19/19 at 10:46;  Status DC


Fentanyl Citrate (Fentanyl 2ml Vial) 100 mcg 1X  ONCE IV  Last administered on 

8/19/19at 10:54;  Start 8/19/19 at 10:45;  Stop 8/19/19 at 10:46;  Status DC


Iohexol (Omnipaque 300 Mg/ml) 100 ml 1X  ONCE IART  Last administered on 

8/19/19at 10:54;  Start 8/19/19 at 10:45;  Stop 8/19/19 at 10:46;  Status DC


Lidocaine HCl (Xylocaine-Mpf 1% 2ml Vial) 2 ml 1X  ONCE INJ  Last administered 

on 8/19/19at 10:54;  Start 8/19/19 at 10:45;  Stop 8/19/19 at 10:46;  Status DC


Info (CONTRAST GIVEN -- Rx MONITORING) 1 each PRN DAILY  PRN MC SEE COMMENTS;  

Start 8/19/19 at 11:00;  Stop 8/21/19 at 10:59


Sodium Chloride 1,000 ml @  100 mls/hr Q10H IV  Last administered on 8/19/19at 

12:18;  Start 8/19/19 at 10:49


Nitroglycerin (Nitrostat) 0.4 mg PRN Q5MIN  PRN SL CHEST PAIN;  Start 8/19/19 at

11:00


Acetaminophen (Tylenol) 325 mg DAILY PO ;  Start 8/20/19 at 09:00


Amlodipine Besylate (Norvasc) 5 mg DAILY PO  Last administered on 8/20/19at 

09:42;  Start 8/20/19 at 09:00


Aspirin (Ecotrin) 325 mg DAILY PO  Last administered on 8/20/19at 09:42;  Start 

8/20/19 at 09:00


Insulin Glargine (Lantus) 17 units QHS SQ  Last administered on 8/19/19at 20:23;

 Start 8/19/19 at 21:00


Lisinopril (Prinivil) 10 mg BID PO  Last administered on 8/20/19at 09:42;  Start

8/19/19 at 21:00


Calcium/Vitamin D (Oscal D 500mg/ 200uts) 1 tab DAILY PO  Last administered on 

8/20/19at 09:42;  Start 8/20/19 at 09:00


Insulin Human Lispro (HumaLOG) 5 units DAILYWBKFT SQ ;  Start 8/19/19 at 17:00


Insulin Human Lispro (HumaLOG) 10 units DAILYWSUP SQ  Last administered on 

8/19/19at 17:30;  Start 8/19/19 at 17:00


Multivitamins (Thera M Plus) 1 tab DAILY PO  Last administered on 8/20/19at 

09:42;  Start 8/20/19 at 09:00


Non-Formulary Medication (Ubidecarenone (Coq-10)) 100 mg DAILY PO ;  Start 

8/20/19 at 09:00;  Status UNV


Vitamin E 200 unit DAILY PO  Last administered on 8/20/19at 09:42;  Start 

8/20/19 at 09:00


Morphine Sulfate (Morphine Sulfate) 2 mg PRN Q4HRS  PRN IV PAIN;  Start 8/19/19 

at 20:15


Tramadol HCl (Ultram) 50 mg PRN Q6HRS  PRN PO PAIN Last administered on 

8/20/19at 09:42;  Start 8/19/19 at 20:15





Active Scripts


Active


Reported


Novolog Flexpen (Insulin Aspart) 100 Unit/1 Ml Insuln.pen 10 Unit SQ DAILYWSUP


Novolog Flexpen (Insulin Aspart) 100 Unit/1 Ml Insuln.pen 5 Unit SQ DAILYWBKFT


Vitamin E (Vitamin E Acid Succinate) 100 Unit Tablet 100 Unit PO DAILY


Coq-10 (Ubidecarenone) 100 Mg Capsule 100 Mg PO DAILY


Lantus Solostar (Insulin Glargine,Hum.rec.anlog) 100 Unit/1 Ml Insuln.pen 17 

Unit SQ QHS


Caltrate 600 + D Soft Chew Tab (Calcium Carbonate/Vitamin D3) 1 Each Tab.chew 1 

Each PO DAILY


One Daily For Women 50+ Adv Tb (Multivitamins-Min/Fa/Ginkgo) 1 Each Tablet 1 

Each PO DAILY


Tylenol (Acetaminophen) 325 Mg Tablet 325 Mg PO DAILY


Aspirin Ec (Aspirin) 325 Mg Tablet.dr 325 Mg PO DAILY


Amlodipine Besylate 5 Mg Tablet 5 Mg PO DAILY


Zestril (Lisinopril) 10 Mg Tablet 10 Mg PO BID


Metformin Hcl 1,000 Mg Tablet 1,000 Mg PO BIDWMEALS


Vitals/I & O





Vital Sign - Last 24 Hours








 8/19/19 8/19/19 8/19/19 8/19/19





 11:01 11:15 11:16 11:31


 


Temp  97.7  





  97.7  


 


Pulse 65 63 60 58


 


Resp  18  


 


B/P (MAP) 118/56 (76) 118/56 (76) 114/56 (75) 114/54 (74)


 


Pulse Ox  92  


 


O2 Delivery  Room Air  


 


    





    





 8/19/19 8/19/19 8/19/19 8/19/19





 11:46 12:01 12:31 13:01


 


Pulse 56 58 54 56


 


B/P (MAP) 119/57 (77) 139/62 (87) 132/62 (85) 126/59 (81)





 8/19/19 8/19/19 8/19/19 8/19/19





 13:31 14:31 15:00 19:40


 


Temp   98.1 





   98.1 


 


Pulse 64 62 67 


 


Resp   18 


 


B/P (MAP) 149/65 (93) 144/63 (90) 144/63 (90) 


 


Pulse Ox   98 


 


O2 Delivery   Room Air Room Air


 


    





    





 8/19/19 8/19/19 8/19/19 8/19/19





 19:45 20:23 20:26 22:50


 


Temp 97.6   98.4





 97.6   98.4


 


Pulse 73 71  73


 


Resp 20   18


 


B/P (MAP) 178/74 (108) 178/74  144/64 (90)


 


Pulse Ox 95   96


 


O2 Delivery Room Air  Room Air Room Air


 


    





    





 8/20/19 8/20/19 8/20/19 8/20/19





 03:20 07:00 08:00 09:42


 


Temp 98.2 98.6  





 98.2 98.6  


 


Pulse 68 64  64


 


Resp 18 16  


 


B/P (MAP) 154/64 (94) 149/66 (93)  149/66


 


Pulse Ox 96 97  


 


O2 Delivery Room Air Room Air Room Air 


 


    





    





 8/20/19 8/20/19 8/20/19 





 09:42 09:42 10:49 


 


Pulse 64   


 


Resp   14 


 


B/P (MAP) 149/66   


 


Pulse Ox  97 97 


 


O2 Delivery  Room Air Room Air 














Intake and Output   


 


 8/19/19 8/19/19 8/20/19





 14:59 22:59 06:59


 


Intake Total  50 ml 200 ml


 


Output Total 800 ml 600 ml 750 ml


 


Balance -800 ml -550 ml -550 ml

















SOFIA PAUL MD          Aug 20, 2019 10:55

## 2019-08-20 NOTE — PDOC
PROGRESS NOTES


Assessment


Vasovagal syncope,  component of hypertensive encephalopathy, possible 

concussion


No evidence of stroke or seizure.  EEG is negative


Family stress as described below


Plan


Await MRI brain, cardiac workup


Discussed with patient son


Subjective


Still feels a little off, better than yesterday. Note that patient has been 

under stress, her son  suddenly two weeks ago, she had a big family reunion 

and family wedding soon after


Objective





Vital Signs








  Date Time  Temp Pulse Resp B/P (MAP) Pulse Ox O2 Delivery O2 Flow Rate FiO2


 


19 10:49   14  97 Room Air  


 


19 09:42  64  149/66    


 


19 07:00 98.6       





 98.6       


 


19 10:54       2.0 














Intake and Output 


 


 19





 06:59


 


Intake Total 250 ml


 


Output Total 2150 ml


 


Balance -1900 ml


 


 


 


Intake Oral 250 ml


 


Output Urine Total 2150 ml


 


# Bowel Movements 1








PHYSICAL EXAM


Alert. Oriented to time, place and person.


PERRL.


EOMI.


CN: no focal findings.


Muscle tone: normal.


Muscle strength: 5/5


DTR:  2+


Plantar reflex:  flexor


Gait: not examined in bed.


Sensory exam: no abnormal findings.


No cerebellar signs elicited.


Review of Relevant


I have reviewed the following items lorenzo (where applicable) has been applied.


Labs





Laboratory Tests








Test


 19


02:15 19


07:22 19


07:25 19


10:00


 


White Blood Count


 11.2 x10^3/uL


(4.0-11.0) 


 


 





 


Red Blood Count


 3.59 x10^6/uL


(3.50-5.40) 


 


 





 


Hemoglobin


 10.5 g/dL


(12.0-15.5) 


 


 





 


Hematocrit


 29.9 %


(36.0-47.0) 


 


 





 


Mean Corpuscular Volume 83 fL ()    


 


Mean Corpuscular Hemoglobin 29 pg (25-35)    


 


Mean Corpuscular Hemoglobin


Concent 35 g/dL


(31-37) 


 


 





 


Red Cell Distribution Width


 12.9 %


(11.5-14.5) 


 


 





 


Platelet Count


 297 x10^3/uL


(140-400) 


 


 





 


Neutrophils (%) (Auto) 83 % (31-73)    


 


Lymphocytes (%) (Auto) 9 % (24-48)    


 


Monocytes (%) (Auto) 7 % (0-9)    


 


Eosinophils (%) (Auto) 0 % (0-3)    


 


Basophils (%) (Auto) 0 % (0-3)    


 


Neutrophils # (Auto)


 9.4 x10^3/uL


(1.8-7.7) 


 


 





 


Lymphocytes # (Auto)


 1.0 x10^3/uL


(1.0-4.8) 


 


 





 


Monocytes # (Auto)


 0.8 x10^3/uL


(0.0-1.1) 


 


 





 


Eosinophils # (Auto)


 0.0 x10^3/uL


(0.0-0.7) 


 


 





 


Basophils # (Auto)


 0.0 x10^3/uL


(0.0-0.2) 


 


 





 


Sodium Level


 125 mmol/L


(136-145) 


 


 





 


Potassium Level


 4.3 mmol/L


(3.5-5.1) 


 


 





 


Chloride Level


 92 mmol/L


() 


 


 





 


Carbon Dioxide Level


 25 mmol/L


(21-32) 


 


 





 


Anion Gap 8 (6-14)    


 


Blood Urea Nitrogen


 18 mg/dL


(7-20) 


 


 





 


Creatinine


 1.0 mg/dL


(0.6-1.0) 


 


 





 


Estimated GFR


(Cockcroft-Gault) 52.3 


 


 


 





 


BUN/Creatinine Ratio 18 (6-20)    


 


Glucose Level


 103 mg/dL


(70-99) 


 


 





 


Calcium Level


 8.9 mg/dL


(8.5-10.1) 


 


 





 


Total Bilirubin


 0.5 mg/dL


(0.2-1.0) 


 


 





 


Aspartate Amino Transf


(AST/SGOT) 22 U/L (15-37) 


 


 


 





 


Alanine Aminotransferase


(ALT/SGPT) 22 U/L (14-59) 


 


 


 





 


Alkaline Phosphatase


 68 U/L


() 


 


 





 


Creatine Kinase


 137 U/L


() 


 


 





 


Troponin I Quantitative


 0.334 ng/mL


(0.000-0.055) 


 1.305 ng/mL


(0.000-0.055) 1.506 ng/mL


(0.000-0.055)


 


NT-Pro-B-Type Natriuretic


Peptide 857 pg/mL


(0-449) 


 


 





 


Total Protein


 7.1 g/dL


(6.4-8.2) 


 


 





 


Albumin


 3.4 g/dL


(3.4-5.0) 


 


 





 


Albumin/Globulin Ratio 0.9 (1.0-1.7)    


 


Thyroid Stimulating Hormone


(TSH) 5.277 uIU/mL


(0.358-3.74) 


 


 





 


Glucose (Fingerstick)


 


 89 mg/dL


(70-99) 


 





 


Test


 19


12:30 19


17:22 19


20:10 19


21:30


 


Glucose (Fingerstick)


 96 mg/dL


(70-99) 135 mg/dL


(70-99) 85 mg/dL


(70-99) 





 


Urine Collection Type    Unknown 


 


Urine Color    Straw 


 


Urine Clarity    Clear 


 


Urine pH    6.5 


 


Urine Specific Gravity    1.010 


 


Urine Protein


 


 


 


 30 mg/dL


(NEG-TRACE)


 


Urine Glucose (UA)


 


 


 


 Negative mg/dL


(NEG)


 


Urine Ketones (Stick)


 


 


 


 Negative mg/dL


(NEG)


 


Urine Blood    Negative (NEG) 


 


Urine Nitrite    Negative (NEG) 


 


Urine Bilirubin    Negative (NEG) 


 


Urine Urobilinogen Dipstick


 


 


 


 0.2 mg/dL (0.2


mg/dL)


 


Urine Leukocyte Esterase    Negative (NEG) 


 


Urine RBC    0 /HPF (0-2) 


 


Urine WBC


 


 


 


 Rare /HPF


(0-4)


 


Urine Squamous Epithelial


Cells 


 


 


 Few /LPF 





 


Urine Bacteria


 


 


 


 Few /HPF


(0-FEW)


 


Test


 19


06:15 19


06:25 19


07:22 





 


Sodium Level


 130 mmol/L


(136-145) 


 


 





 


Potassium Level


 4.0 mmol/L


(3.5-5.1) 


 


 





 


Chloride Level


 96 mmol/L


() 


 


 





 


Carbon Dioxide Level


 25 mmol/L


(21-32) 


 


 





 


Anion Gap 9 (6-14)    


 


Blood Urea Nitrogen


 15 mg/dL


(7-20) 


 


 





 


Creatinine


 0.9 mg/dL


(0.6-1.0) 


 


 





 


Estimated GFR


(Cockcroft-Gault) 59.1 


 


 


 





 


Glucose Level


 67 mg/dL


(70-99) 


 


 





 


Calcium Level


 8.8 mg/dL


(8.5-10.1) 


 


 





 


White Blood Count


 


 10.1 x10^3/uL


(4.0-11.0) 


 





 


Red Blood Count


 


 3.72 x10^6/uL


(3.50-5.40) 


 





 


Hemoglobin


 


 10.8 g/dL


(12.0-15.5) 


 





 


Hematocrit


 


 31.3 %


(36.0-47.0) 


 





 


Mean Corpuscular Volume  84 fL ()   


 


Mean Corpuscular Hemoglobin  29 pg (25-35)   


 


Mean Corpuscular Hemoglobin


Concent 


 34 g/dL


(31-37) 


 





 


Red Cell Distribution Width


 


 13.3 %


(11.5-14.5) 


 





 


Platelet Count


 


 308 x10^3/uL


(140-400) 


 





 


Neutrophils (%) (Auto)  69 % (31-73)   


 


Lymphocytes (%) (Auto)  20 % (24-48)   


 


Monocytes (%) (Auto)  9 % (0-9)   


 


Eosinophils (%) (Auto)  1 % (0-3)   


 


Basophils (%) (Auto)  0 % (0-3)   


 


Neutrophils # (Auto)


 


 7.0 x10^3/uL


(1.8-7.7) 


 





 


Lymphocytes # (Auto)


 


 2.0 x10^3/uL


(1.0-4.8) 


 





 


Monocytes # (Auto)


 


 0.9 x10^3/uL


(0.0-1.1) 


 





 


Eosinophils # (Auto)


 


 0.1 x10^3/uL


(0.0-0.7) 


 





 


Basophils # (Auto)


 


 0.0 x10^3/uL


(0.0-0.2) 


 





 


Glucose (Fingerstick)


 


 


 74 mg/dL


(70-99) 











Laboratory Tests








Test


 19


12:30 19


17:22 19


20:10 19


21:30


 


Glucose (Fingerstick)


 96 mg/dL


(70-99) 135 mg/dL


(70-99) 85 mg/dL


(70-99) 





 


Urine Collection Type    Unknown 


 


Urine Color    Straw 


 


Urine Clarity    Clear 


 


Urine pH    6.5 


 


Urine Specific Gravity    1.010 


 


Urine Protein


 


 


 


 30 mg/dL


(NEG-TRACE)


 


Urine Glucose (UA)


 


 


 


 Negative mg/dL


(NEG)


 


Urine Ketones (Stick)


 


 


 


 Negative mg/dL


(NEG)


 


Urine Blood    Negative (NEG) 


 


Urine Nitrite    Negative (NEG) 


 


Urine Bilirubin    Negative (NEG) 


 


Urine Urobilinogen Dipstick


 


 


 


 0.2 mg/dL (0.2


mg/dL)


 


Urine Leukocyte Esterase    Negative (NEG) 


 


Urine RBC    0 /HPF (0-2) 


 


Urine WBC


 


 


 


 Rare /HPF


(0-4)


 


Urine Squamous Epithelial


Cells 


 


 


 Few /LPF 





 


Urine Bacteria


 


 


 


 Few /HPF


(0-FEW)


 


Test


 19


06:15 19


06:25 19


07:22 





 


Sodium Level


 130 mmol/L


(136-145) 


 


 





 


Potassium Level


 4.0 mmol/L


(3.5-5.1) 


 


 





 


Chloride Level


 96 mmol/L


() 


 


 





 


Carbon Dioxide Level


 25 mmol/L


(21-32) 


 


 





 


Anion Gap 9 (6-14)    


 


Blood Urea Nitrogen


 15 mg/dL


(7-20) 


 


 





 


Creatinine


 0.9 mg/dL


(0.6-1.0) 


 


 





 


Estimated GFR


(Cockcroft-Gault) 59.1 


 


 


 





 


Glucose Level


 67 mg/dL


(70-99) 


 


 





 


Calcium Level


 8.8 mg/dL


(8.5-10.1) 


 


 





 


White Blood Count


 


 10.1 x10^3/uL


(4.0-11.0) 


 





 


Red Blood Count


 


 3.72 x10^6/uL


(3.50-5.40) 


 





 


Hemoglobin


 


 10.8 g/dL


(12.0-15.5) 


 





 


Hematocrit


 


 31.3 %


(36.0-47.0) 


 





 


Mean Corpuscular Volume  84 fL ()   


 


Mean Corpuscular Hemoglobin  29 pg (25-35)   


 


Mean Corpuscular Hemoglobin


Concent 


 34 g/dL


(31-37) 


 





 


Red Cell Distribution Width


 


 13.3 %


(11.5-14.5) 


 





 


Platelet Count


 


 308 x10^3/uL


(140-400) 


 





 


Neutrophils (%) (Auto)  69 % (31-73)   


 


Lymphocytes (%) (Auto)  20 % (24-48)   


 


Monocytes (%) (Auto)  9 % (0-9)   


 


Eosinophils (%) (Auto)  1 % (0-3)   


 


Basophils (%) (Auto)  0 % (0-3)   


 


Neutrophils # (Auto)


 


 7.0 x10^3/uL


(1.8-7.7) 


 





 


Lymphocytes # (Auto)


 


 2.0 x10^3/uL


(1.0-4.8) 


 





 


Monocytes # (Auto)


 


 0.9 x10^3/uL


(0.0-1.1) 


 





 


Eosinophils # (Auto)


 


 0.1 x10^3/uL


(0.0-0.7) 


 





 


Basophils # (Auto)


 


 0.0 x10^3/uL


(0.0-0.2) 


 





 


Glucose (Fingerstick)


 


 


 74 mg/dL


(70-99) 











Medications





Current Medications


Aspirin (Ecotrin) 324 mg 1X  ONCE PO  Last administered on 19at 03:10;  

Start 19 at 03:30;  Stop 19 at 03:31;  Status DC


Ondansetron HCl (Zofran) 4 mg PRN Q8HRS  PRN IV NAUSEA/VOMITING 1ST CHOICE;  

Start 19 at 04:30;  Stop 19 at 04:29;  Status DC


Sodium Chloride 1,000 ml @  75 mls/hr [id number] IV  Last administered on 19at

04:41;  Start 19 at 05:00;  Stop 19 at 04:59;  Status DC


Lidocaine HCl (Xylocaine-Mpf 1% 2ml Vial) 2 ml STK-MED ONCE .ROUTE ;  Start 

19 at 09:30;  Stop 19 at 09:31;  Status DC


Iohexol (Omnipaque 300 Mg/ml) 100 ml STK-MED ONCE .ROUTE ;  Start 19 at 

09:30;  Stop 19 at 09:31;  Status DC


Heparin Sodium/ Sodium Chloride 500 ml @  As Directed STK-MED ONCE .ROUTE ;  

Start 19 at 09:30;  Stop 19 at 09:31;  Status DC


Fentanyl Citrate (Fentanyl 2ml Vial) 100 mcg STK-MED ONCE .ROUTE ;  Start 

19 at 09:48;  Stop 19 at 09:48;  Status DC


Midazolam HCl (Versed) 2 mg STK-MED ONCE .ROUTE ;  Start 19 at 09:48;  Stop

19 at 09:48;  Status DC


Heparin Sodium (Porcine) (Heparin Sodium) 10,000 unit STK-MED ONCE .ROUTE ;  

Start 19 at 09:48;  Stop 19 at 09:48;  Status DC


Verapamil HCl (Verapamil) 5 mg STK-MED ONCE .ROUTE ;  Start 19 at 09:48;  

Stop 19 at 09:48;  Status DC


Nitroglycerin (Nitroglycerin) 200 mcg STK-MED ONCE .ROUTE ;  Start 19 at 

09:48;  Stop 19 at 09:49;  Status DC


Iohexol (Omnipaque 300 Mg/ml) 100 ml STK-MED ONCE .ROUTE ;  Start 19 at 

10:24;  Stop 19 at 10:24;  Status DC


Nitroglycerin (Nitroglycerin) 200 mcg 1X  ONCE IART  Last administered on 

19at 10:54;  Start 19 at 10:45;  Stop 19 at 10:46;  Status DC


Verapamil HCl (Verapamil) 2.5 mg 1X  ONCE IART  Last administered on  

10:54;  Start 19 at 10:45;  Stop 19 at 10:46;  Status DC


Heparin Sodium (Porcine) (Heparin Sodium) 2,500 unit 1X  ONCE IART  Last 

administered on  10:54;  Start 19 at 10:45;  Stop 19 at 

10:46;  Status DC


Heparin Sodium/ Sodium Chloride (HEPARIN for ARTERIAL LINE FLUSH) 1,000 unit 1X 

ONCE IART  Last administered on  10:54;  Start 19 at 10:45;  Stop 

19 at 10:46;  Status DC


Midazolam HCl (Versed) 2 mg 1X  ONCE IV  Last administered on  10:54;  

Start 19 at 10:45;  Stop 19 at 10:46;  Status DC


Fentanyl Citrate (Fentanyl 2ml Vial) 100 mcg 1X  ONCE IV  Last administered on 

 10:54;  Start 19 at 10:45;  Stop 19 at 10:46;  Status DC


Iohexol (Omnipaque 300 Mg/ml) 100 ml 1X  ONCE IART  Last administered on 

 10:54;  Start 19 at 10:45;  Stop 19 at 10:46;  Status DC


Lidocaine HCl (Xylocaine-Mpf 1% 2ml Vial) 2 ml 1X  ONCE INJ  Last administered 

on  10:54;  Start 19 at 10:45;  Stop 19 at 10:46;  Status DC


Info (CONTRAST GIVEN -- Rx MONITORING) 1 each PRN DAILY  PRN MC SEE COMMENTS;  

Start 19 at 11:00;  Stop 19 at 10:59


Sodium Chloride 1,000 ml @  100 mls/hr Q10H IV  Last administered on 19at 

12:18;  Start 19 at 10:49


Nitroglycerin (Nitrostat) 0.4 mg PRN Q5MIN  PRN SL CHEST PAIN;  Start 19 at

11:00


Acetaminophen (Tylenol) 325 mg DAILY PO ;  Start 19 at 09:00


Amlodipine Besylate (Norvasc) 5 mg DAILY PO  Last administered on  

09:42;  Start 19 at 09:00


Aspirin (Ecotrin) 325 mg DAILY PO  Last administered on  09:42;  Start 

19 at 09:00


Insulin Glargine (Lantus) 17 units QHS SQ  Last administered on  20:23;

 Start 19 at 21:00


Lisinopril (Prinivil) 10 mg BID PO  Last administered on  09:42;  Start

19 at 21:00


Calcium/Vitamin D (Oscal D 500mg/ 200uts) 1 tab DAILY PO  Last administered on 

 09:42;  Start 19 at 09:00


Insulin Human Lispro (HumaLOG) 5 units DAILYWBKFT SQ ;  Start 19 at 17:00


Insulin Human Lispro (HumaLOG) 10 units DAILYWSUP SQ  Last administered on 

 17:30;  Start 19 at 17:00


Multivitamins (Thera M Plus) 1 tab DAILY PO  Last administered on  

09:42;  Start 19 at 09:00


Non-Formulary Medication (Ubidecarenone (Coq-10)) 100 mg DAILY PO ;  Start 

19 at 09:00;  Status UNV


Vitamin E 200 unit DAILY PO  Last administered on  09:42;  Start 

19 at 09:00


Morphine Sulfate (Morphine Sulfate) 2 mg PRN Q4HRS  PRN IV PAIN;  Start 19 

at 20:15


Tramadol HCl (Ultram) 50 mg PRN Q6HRS  PRN PO PAIN Last administered on 

 09:42;  Start 19 at 20:15





Active Scripts


Active


Reported


Novolog Flexpen (Insulin Aspart) 100 Unit/1 Ml Insuln.pen 10 Unit SQ DAILYWSUP


Novolog Flexpen (Insulin Aspart) 100 Unit/1 Ml Insuln.pen 5 Unit SQ DAILYWBKFT


Vitamin E (Vitamin E Acid Succinate) 100 Unit Tablet 100 Unit PO DAILY


Coq-10 (Ubidecarenone) 100 Mg Capsule 100 Mg PO DAILY


Lantus Solostar (Insulin Glargine,Hum.rec.anlog) 100 Unit/1 Ml Insuln.pen 17 

Unit SQ QHS


Caltrate 600 + D Soft Chew Tab (Calcium Carbonate/Vitamin D3) 1 Each Tab.chew 1 

Each PO DAILY


One Daily For Women 50+ Adv Tb (Multivitamins-Min/Fa/Ginkgo) 1 Each Tablet 1 

Each PO DAILY


Tylenol (Acetaminophen) 325 Mg Tablet 325 Mg PO DAILY


Aspirin Ec (Aspirin) 325 Mg Tablet.dr 325 Mg PO DAILY


Amlodipine Besylate 5 Mg Tablet 5 Mg PO DAILY


Zestril (Lisinopril) 10 Mg Tablet 10 Mg PO BID


Metformin Hcl 1,000 Mg Tablet 1,000 Mg PO BIDWMEALS


Vitals/I & O





Vital Sign - Last 24 Hours








 19





 10:54 10:54 10:54 11:01


 


Pulse 66  66 65


 


Resp 14 16  


 


B/P (MAP)   102/43 118/56 (76)


 


Pulse Ox 92 92  


 


O2 Delivery Nasal Cannula Nasal Cannula  


 


O2 Flow Rate 2.0 2.0  





 19





 11:15 11:16 11:31 11:46


 


Temp 97.7   





 97.7   


 


Pulse 63 60 58 56


 


Resp 18   


 


B/P (MAP) 118/56 (76) 114/56 (75) 114/54 (74) 119/57 (77)


 


Pulse Ox 92   


 


O2 Delivery Room Air   


 


    





    





 19





 12:01 12:31 13:01 13:31


 


Pulse 58 54 56 64


 


B/P (MAP) 139/62 (87) 132/62 (85) 126/59 (81) 149/65 (93)





 19





 14:31 15:00 19:40 19:45


 


Temp  98.1  97.6





  98.1  97.6


 


Pulse 62 67  73


 


Resp  18  20


 


B/P (MAP) 144/63 (90) 144/63 (90)  178/74 (108)


 


Pulse Ox  98  95


 


O2 Delivery  Room Air Room Air Room Air


 


    





    





 19





 20:23 20:26 22:50 03:20


 


Temp   98.4 98.2





   98.4 98.2


 


Pulse 71  73 68


 


Resp   18 18


 


B/P (MAP) 178/74  144/64 (90) 154/64 (94)


 


Pulse Ox   96 96


 


O2 Delivery  Room Air Room Air Room Air


 


    





    





 19





 07:00 08:00 09:42 09:42


 


Temp 98.6   





 98.6   


 


Pulse 64  64 64


 


Resp 16   


 


B/P (MAP) 149/66 (93)  149/66 149/66


 


Pulse Ox 97   


 


O2 Delivery Room Air Room Air  


 


    





    





 19  





 09:42 10:49  


 


Resp  14  


 


Pulse Ox 97 97  


 


O2 Delivery Room Air Room Air  














Intake and Output   


 


 19





 14:59 22:59 06:59


 


Intake Total  50 ml 200 ml


 


Output Total 800 ml 600 ml 750 ml


 


Balance -800 ml -550 ml -550 ml

















JEAN BUTLER MD           Aug 20, 2019 10:53

## 2019-08-20 NOTE — PDOC
PROGRESS NOTES


Subjective


Subjective


Feeling slightly better today. Denied any chest pain.





Objective


Objective





Vital Signs








  Date Time  Temp Pulse Resp B/P (MAP) Pulse Ox O2 Delivery O2 Flow Rate FiO2


 


8/20/19 15:00 97.9 60 16 146/67 (93) 95 Room Air  





 97.9       


 


8/19/19 10:54       2.0 














Intake and Output 


 


 8/20/19





 06:59


 


Intake Total 250 ml


 


Output Total 2150 ml


 


Balance -1900 ml


 


 


 


Intake Oral 250 ml


 


Output Urine Total 2150 ml


 


# Bowel Movements 1











Physical Exam


Abdomen:  Normal bowel sounds, Soft, No tenderness


Heart:  Regular rate, Normal S1, Other (ESM LUPSB)


Extremities:  No cyanosis


General:  Alert, Cooperative, No acute distress


HEENT:  Atraumatic, PERRLA


Lungs:  Clear to auscultation


Neck:  No JVD


Psych/Mental Status:  Mood NL


Skin:  No significant lesion





Assessment


Assessment


1.  Non-STEMI most probably type 2/demand ischemia secondary to uncontrolled 

hypertension. Cardiac catheterization showed nonobstructive coronary disease. 2-

D echo showed hyperdynamic left ventricle systolic function.


2.  Accelerated hypertension:  Better controlled since admission


3.  Diabetes mellitus type 2:  Treat per IM. 


4.  CVA on brain MRI, acute versus subacute. Neurology team following.





Comment


Review of Relevant


I have reviewed the following items lorenzo (where applicable) has been applied.


Labs





Laboratory Tests








Test


 8/19/19


17:22 8/19/19


20:10 8/19/19


21:30 8/20/19


06:15


 


Glucose (Fingerstick)


 135 mg/dL


(70-99) 85 mg/dL


(70-99) 


 





 


Urine Collection Type   Unknown  


 


Urine Color   Straw  


 


Urine Clarity   Clear  


 


Urine pH   6.5  


 


Urine Specific Gravity   1.010  


 


Urine Protein


 


 


 30 mg/dL


(NEG-TRACE) 





 


Urine Glucose (UA)


 


 


 Negative mg/dL


(NEG) 





 


Urine Ketones (Stick)


 


 


 Negative mg/dL


(NEG) 





 


Urine Blood   Negative (NEG)  


 


Urine Nitrite   Negative (NEG)  


 


Urine Bilirubin   Negative (NEG)  


 


Urine Urobilinogen Dipstick


 


 


 0.2 mg/dL (0.2


mg/dL) 





 


Urine Leukocyte Esterase   Negative (NEG)  


 


Urine RBC   0 /HPF (0-2)  


 


Urine WBC


 


 


 Rare /HPF


(0-4) 





 


Urine Squamous Epithelial


Cells 


 


 Few /LPF 


 





 


Urine Bacteria


 


 


 Few /HPF


(0-FEW) 





 


Sodium Level


 


 


 


 130 mmol/L


(136-145)


 


Potassium Level


 


 


 


 4.0 mmol/L


(3.5-5.1)


 


Chloride Level


 


 


 


 96 mmol/L


()


 


Carbon Dioxide Level


 


 


 


 25 mmol/L


(21-32)


 


Anion Gap    9 (6-14) 


 


Blood Urea Nitrogen


 


 


 


 15 mg/dL


(7-20)


 


Creatinine


 


 


 


 0.9 mg/dL


(0.6-1.0)


 


Estimated GFR


(Cockcroft-Gault) 


 


 


 59.1 





 


Glucose Level


 


 


 


 67 mg/dL


(70-99)


 


Calcium Level


 


 


 


 8.8 mg/dL


(8.5-10.1)


 


Test


 8/20/19


06:25 8/20/19


07:22 8/20/19


11:36 





 


White Blood Count


 10.1 x10^3/uL


(4.0-11.0) 


 


 





 


Red Blood Count


 3.72 x10^6/uL


(3.50-5.40) 


 


 





 


Hemoglobin


 10.8 g/dL


(12.0-15.5) 


 


 





 


Hematocrit


 31.3 %


(36.0-47.0) 


 


 





 


Mean Corpuscular Volume 84 fL ()    


 


Mean Corpuscular Hemoglobin 29 pg (25-35)    


 


Mean Corpuscular Hemoglobin


Concent 34 g/dL


(31-37) 


 


 





 


Red Cell Distribution Width


 13.3 %


(11.5-14.5) 


 


 





 


Platelet Count


 308 x10^3/uL


(140-400) 


 


 





 


Neutrophils (%) (Auto) 69 % (31-73)    


 


Lymphocytes (%) (Auto) 20 % (24-48)    


 


Monocytes (%) (Auto) 9 % (0-9)    


 


Eosinophils (%) (Auto) 1 % (0-3)    


 


Basophils (%) (Auto) 0 % (0-3)    


 


Neutrophils # (Auto)


 7.0 x10^3/uL


(1.8-7.7) 


 


 





 


Lymphocytes # (Auto)


 2.0 x10^3/uL


(1.0-4.8) 


 


 





 


Monocytes # (Auto)


 0.9 x10^3/uL


(0.0-1.1) 


 


 





 


Eosinophils # (Auto)


 0.1 x10^3/uL


(0.0-0.7) 


 


 





 


Basophils # (Auto)


 0.0 x10^3/uL


(0.0-0.2) 


 


 





 


Glucose (Fingerstick)


 


 74 mg/dL


(70-99) 249 mg/dL


(70-99) 











Medications





Current Medications


Acetaminophen (Tylenol Supp) 650 mg PRN Q4HRS  PRN CO TEMP > 100.4F;  Start 

8/20/19 at 14:45


Acetaminophen (Tylenol) 325 mg DAILY PO ;  Start 8/20/19 at 09:00


Acetaminophen (Tylenol) 650 mg PRN Q6HRS  PRN PO TEMP > 100.4F;  Start 8/20/19 

at 14:45


Amlodipine Besylate (Norvasc) 5 mg DAILY PO  Last administered on 8/20/19at 

09:42;  Start 8/20/19 at 09:00


Aspirin (Aspirin Rectal Supp) 300 mg PRN DAILY  PRN CO IF UNABLE TO TAKE PO;  

Start 8/20/19 at 14:45


Aspirin (Ecotrin) 325 mg DAILY PO  Last administered on 8/20/19at 09:42;  Start 

8/20/19 at 09:00


Aspirin (Ecotrin) 325 mg DAILYWBKFT PO ;  Start 8/21/19 at 08:00;  Status Cancel


Calcium/Vitamin D (Oscal D 500mg/ 200uts) 1 tab DAILY PO  Last administered on 

8/20/19at 09:42;  Start 8/20/19 at 09:00


Dextrose 250 ml PRN Q15MIN  PRN IV SEE COMMENTS;  Start 8/20/19 at 12:15;  Stop 

8/20/19 at 12:11;  Status DC


Dextrose (Dextrose 50%-Water Syringe) 12.5 gm PRN Q15MIN  PRN IV SEE COMMENTS;  

Start 8/20/19 at 12:15


Insulin Glargine (Lantus) 17 units QHS SQ  Last administered on 8/19/19at 20:23;

 Start 8/19/19 at 21:00


Insulin Human Lispro (HumaLOG) 0-5 UNITS TIDWMEALS SQ  Last administered on 

8/20/19at 12:15;  Start 8/20/19 at 12:00


Insulin Human Lispro (HumaLOG) 5 units DAILYWBKFT SQ ;  Start 8/19/19 at 17:00


Insulin Human Lispro (HumaLOG) 10 units DAILYWSUP SQ  Last administered on 8/ 19/19at 17:30;  Start 8/19/19 at 17:00


Lisinopril (Prinivil) 10 mg BID PO  Last administered on 8/20/19at 09:42;  Start

8/19/19 at 21:00


Morphine Sulfate (Morphine Sulfate) 2 mg PRN Q4HRS  PRN IV PAIN;  Start 8/19/19 

at 20:15


Multivitamins (Thera M Plus) 1 tab DAILY PO  Last administered on 8/20/19at 

09:42;  Start 8/20/19 at 09:00


Non-Formulary Medication (Ubidecarenone (Coq-10)) 100 mg DAILY PO ;  Start 

8/20/19 at 09:00;  Status UNV


Tramadol HCl (Ultram) 50 mg PRN Q6HRS  PRN PO PAIN Last administered on 8/ 20/19at 09:42;  Start 8/19/19 at 20:15


Vitamin E 200 unit DAILY PO  Last administered on 8/20/19at 09:42;  Start 

8/20/19 at 09:00


Vitals/I & O





Vital Sign - Last 24 Hours








 8/19/19 8/19/19 8/19/19 8/19/19





 19:40 19:45 20:23 20:26


 


Temp  97.6  





  97.6  


 


Pulse  73 71 


 


Resp  20  


 


B/P (MAP)  178/74 (108) 178/74 


 


Pulse Ox  95  


 


O2 Delivery Room Air Room Air  Room Air


 


    





    





 8/19/19 8/20/19 8/20/19 8/20/19





 22:50 03:20 07:00 08:00


 


Temp 98.4 98.2 98.6 





 98.4 98.2 98.6 


 


Pulse 73 68 64 


 


Resp 18 18 16 


 


B/P (MAP) 144/64 (90) 154/64 (94) 149/66 (93) 


 


Pulse Ox 96 96 97 


 


O2 Delivery Room Air Room Air Room Air Room Air


 


    





    





 8/20/19 8/20/19 8/20/19 8/20/19





 09:42 09:42 09:42 10:49


 


Pulse 64 64  


 


Resp    14


 


B/P (MAP) 149/66 149/66  


 


Pulse Ox   97 97


 


O2 Delivery   Room Air Room Air





 8/20/19 8/20/19  





 11:00 15:00  


 


Temp 98.2 97.9  





 98.2 97.9  


 


Pulse 75 60  


 


Resp 16 16  


 


B/P (MAP) 139/63 (88) 146/67 (93)  


 


Pulse Ox 96 95  


 


O2 Delivery Room Air Room Air  














Intake and Output   


 


 8/19/19 8/19/19 8/20/19





 14:59 22:59 06:59


 


Intake Total  50 ml 200 ml


 


Output Total 800 ml 600 ml 750 ml


 


Balance -800 ml -550 ml -550 ml

















JOSE COTO MD           Aug 20, 2019 16:36

## 2019-08-21 VITALS — SYSTOLIC BLOOD PRESSURE: 167 MMHG | DIASTOLIC BLOOD PRESSURE: 74 MMHG

## 2019-08-21 VITALS — DIASTOLIC BLOOD PRESSURE: 69 MMHG | SYSTOLIC BLOOD PRESSURE: 150 MMHG

## 2019-08-21 VITALS — DIASTOLIC BLOOD PRESSURE: 70 MMHG | SYSTOLIC BLOOD PRESSURE: 157 MMHG

## 2019-08-21 VITALS — DIASTOLIC BLOOD PRESSURE: 66 MMHG | SYSTOLIC BLOOD PRESSURE: 152 MMHG

## 2019-08-21 VITALS — SYSTOLIC BLOOD PRESSURE: 138 MMHG | DIASTOLIC BLOOD PRESSURE: 65 MMHG

## 2019-08-21 VITALS — DIASTOLIC BLOOD PRESSURE: 67 MMHG | SYSTOLIC BLOOD PRESSURE: 160 MMHG

## 2019-08-21 VITALS — DIASTOLIC BLOOD PRESSURE: 61 MMHG | SYSTOLIC BLOOD PRESSURE: 135 MMHG

## 2019-08-21 LAB
ALBUMIN SERPL-MCNC: 3 G/DL (ref 3.4–5)
ALBUMIN/GLOB SERPL: 0.8 {RATIO} (ref 1–1.7)
ALP SERPL-CCNC: 65 U/L (ref 46–116)
ALT SERPL-CCNC: 21 U/L (ref 14–59)
ANION GAP SERPL CALC-SCNC: 9 MMOL/L (ref 6–14)
AST SERPL-CCNC: 20 U/L (ref 15–37)
BASOPHILS # BLD AUTO: 0 X10^3/UL (ref 0–0.2)
BASOPHILS NFR BLD: 0 % (ref 0–3)
BILIRUB SERPL-MCNC: 0.5 MG/DL (ref 0.2–1)
BUN SERPL-MCNC: 19 MG/DL (ref 7–20)
BUN/CREAT SERPL: 19 (ref 6–20)
CALCIUM SERPL-MCNC: 8.7 MG/DL (ref 8.5–10.1)
CHLORIDE SERPL-SCNC: 96 MMOL/L (ref 98–107)
CHOLEST SERPL-MCNC: 183 MG/DL (ref 0–200)
CHOLEST/HDLC SERPL: 3.7 {RATIO}
CO2 SERPL-SCNC: 25 MMOL/L (ref 21–32)
CREAT SERPL-MCNC: 1 MG/DL (ref 0.6–1)
EOSINOPHIL NFR BLD: 0.2 X10^3/UL (ref 0–0.7)
EOSINOPHIL NFR BLD: 2 % (ref 0–3)
ERYTHROCYTE [DISTWIDTH] IN BLOOD BY AUTOMATED COUNT: 13.2 % (ref 11.5–14.5)
GFR SERPLBLD BASED ON 1.73 SQ M-ARVRAT: 52.3 ML/MIN
GLOBULIN SER-MCNC: 3.6 G/DL (ref 2.2–3.8)
GLUCOSE SERPL-MCNC: 119 MG/DL (ref 70–99)
HCT VFR BLD CALC: 30.9 % (ref 36–47)
HDLC SERPL-MCNC: 49 MG/DL (ref 40–60)
HGB BLD-MCNC: 10.6 G/DL (ref 12–15.5)
LDLC: 107 MG/DL (ref 0–100)
LYMPHOCYTES # BLD: 1.7 X10^3/UL (ref 1–4.8)
LYMPHOCYTES NFR BLD AUTO: 20 % (ref 24–48)
MCH RBC QN AUTO: 29 PG (ref 25–35)
MCHC RBC AUTO-ENTMCNC: 34 G/DL (ref 31–37)
MCV RBC AUTO: 84 FL (ref 79–100)
MONO #: 0.8 X10^3/UL (ref 0–1.1)
MONOCYTES NFR BLD: 10 % (ref 0–9)
NEUT #: 5.8 X10^3/UL (ref 1.8–7.7)
NEUTROPHILS NFR BLD AUTO: 68 % (ref 31–73)
PLATELET # BLD AUTO: 292 X10^3/UL (ref 140–400)
POTASSIUM SERPL-SCNC: 3.9 MMOL/L (ref 3.5–5.1)
PROT SERPL-MCNC: 6.6 G/DL (ref 6.4–8.2)
RBC # BLD AUTO: 3.69 X10^6/UL (ref 3.5–5.4)
SODIUM SERPL-SCNC: 130 MMOL/L (ref 136–145)
TRIGL SERPL-MCNC: 134 MG/DL (ref 0–150)
VLDLC: 27 MG/DL (ref 0–40)
WBC # BLD AUTO: 8.6 X10^3/UL (ref 4–11)

## 2019-08-21 RX ADMIN — LISINOPRIL SCH MG: 10 TABLET ORAL at 21:20

## 2019-08-21 RX ADMIN — ACETAMINOPHEN SCH MG: 325 TABLET, FILM COATED ORAL at 08:13

## 2019-08-21 RX ADMIN — CALCIUM CARBONATE-VITAMIN D TAB 500 MG-200 UNIT SCH TAB: 500-200 TAB at 08:16

## 2019-08-21 RX ADMIN — INSULIN LISPRO SCH UNITS: 100 INJECTION, SOLUTION INTRAVENOUS; SUBCUTANEOUS at 18:04

## 2019-08-21 RX ADMIN — INSULIN LISPRO SCH UNITS: 100 INJECTION, SOLUTION INTRAVENOUS; SUBCUTANEOUS at 12:00

## 2019-08-21 RX ADMIN — SODIUM CHLORIDE SCH MLS/HR: 450 INJECTION, SOLUTION INTRAVENOUS at 10:45

## 2019-08-21 RX ADMIN — INSULIN LISPRO SCH UNITS: 100 INJECTION, SOLUTION INTRAVENOUS; SUBCUTANEOUS at 18:03

## 2019-08-21 RX ADMIN — Medication SCH UNIT: at 08:16

## 2019-08-21 RX ADMIN — INSULIN GLARGINE SCH UNITS: 100 INJECTION, SOLUTION SUBCUTANEOUS at 21:19

## 2019-08-21 RX ADMIN — INSULIN LISPRO SCH UNITS: 100 INJECTION, SOLUTION INTRAVENOUS; SUBCUTANEOUS at 07:48

## 2019-08-21 RX ADMIN — LISINOPRIL SCH MG: 10 TABLET ORAL at 08:17

## 2019-08-21 RX ADMIN — CLOPIDOGREL BISULFATE SCH MG: 75 TABLET ORAL at 09:18

## 2019-08-21 RX ADMIN — SODIUM CHLORIDE SCH MLS/HR: 450 INJECTION, SOLUTION INTRAVENOUS at 02:49

## 2019-08-21 RX ADMIN — ASPIRIN SCH MG: 325 TABLET, DELAYED RELEASE ORAL at 08:16

## 2019-08-21 RX ADMIN — MULTIPLE VITAMINS W/ MINERALS TAB SCH TAB: TAB at 08:16

## 2019-08-21 NOTE — NUR
SS following up with discharge planning. PT/OT recommending skilled nursing unit. SS met 
with pt to discuss discharge planning and skilled nursing unit. Pt deferred SS to her son, 
Riley, for decision making. SS contacted pt's son Riley and discussed skilled nursing unit 
and discharge planning. SS discussed [facility]. Pt's son declined [facility] as 
a first choice and stated that he wants the referral to be phoned and faxed to [facility], [phone]; fax [phone]. He stated that if they cannot accept 
he would consider other options. SS phoned and faxed referral. SS will await acceptance 
decision and will proceed accordingly.

## 2019-08-21 NOTE — PDOC
PROGRESS NOTES


Assessment


Problems


Medical Problems:


(1) Diabetes


Status: Chronic  





(2) Dyspnea


Status: Acute  





(3) HTN (hypertension)


Status: Chronic  





(4) Hypertensive encephalopathy


Status: Acute  





(5) Metabolic encephalopathy


Status: Acute  





(6) NSTEMI (non-ST elevated myocardial infarction)


Status: Acute  





(7) Valvular stenosis, aortic


Status: Chronic  





Multiple embolic strokes:  anterior left cerebellum, posterior medial left 

cerebellum, right occipital lobe, left temporal lobe, posterior right centrum 

semiovale, right parietal cortical surface, right frontal lobe, right frontal 

cortical surface.


Vasovagal syncope, component of hypertensive encephalopathy, possible concussion


No evidence of seizure,  EEG is negative


Family stress


Note hyperlipidemia, is on a statin


Plan


Discussed with cardiology, needs event monitor, possible NORBERT


Add clopidogrel, discontinue aspirin after a week


Possible discharge today


Follow up with neurology PRN


Follow up with PCP re cholesterol


Discussed with patient son, especially stroke precautions.


Subjective


No complaints, now adds that she had some left hand numbness on 8/18


Objective





Vital Signs








  Date Time  Temp Pulse Resp B/P (MAP) Pulse Ox O2 Delivery O2 Flow Rate FiO2


 


8/21/19 08:19  67  135/61    


 


8/21/19 08:19     96 Room Air 2.0 


 


8/21/19 07:00 97.9  16     





 97.9       














Intake and Output 


 


 8/21/19





 06:59


 


Intake Total 200 ml


 


Output Total 550 ml


 


Balance -350 ml


 


 


 


Intake Oral 200 ml


 


Output Urine Total 550 ml


 


# Voids 1








PHYSICAL EXAM


Alert. Oriented to time, place and person.


PERRL.


EOMI.


CN: no focal findings.


Muscle tone: normal.


Muscle strength: 5/5


DTR:  2+


Plantar reflex:  flexor


Gait: not examined in bed.


Sensory exam: no abnormal findings.


No cerebellar signs elicited.


Review of Relevant


I have reviewed the following items lorenzo (where applicable) has been applied.


Labs





Laboratory Tests








Test


 8/19/19


10:00 8/19/19


12:30 8/19/19


17:22 8/19/19


20:10


 


Troponin I Quantitative


 1.506 ng/mL


(0.000-0.055) 


 


 





 


Glucose (Fingerstick)


 


 96 mg/dL


(70-99) 135 mg/dL


(70-99) 85 mg/dL


(70-99)


 


Test


 8/19/19


21:30 8/20/19


06:15 8/20/19


06:25 8/20/19


07:22


 


Urine Collection Type Unknown    


 


Urine Color Straw    


 


Urine Clarity Clear    


 


Urine pH 6.5    


 


Urine Specific Gravity 1.010    


 


Urine Protein


 30 mg/dL


(NEG-TRACE) 


 


 





 


Urine Glucose (UA)


 Negative mg/dL


(NEG) 


 


 





 


Urine Ketones (Stick)


 Negative mg/dL


(NEG) 


 


 





 


Urine Blood Negative (NEG)    


 


Urine Nitrite Negative (NEG)    


 


Urine Bilirubin Negative (NEG)    


 


Urine Urobilinogen Dipstick


 0.2 mg/dL (0.2


mg/dL) 


 


 





 


Urine Leukocyte Esterase Negative (NEG)    


 


Urine RBC 0 /HPF (0-2)    


 


Urine WBC


 Rare /HPF


(0-4) 


 


 





 


Urine Squamous Epithelial


Cells Few /LPF 


 


 


 





 


Urine Bacteria


 Few /HPF


(0-FEW) 


 


 





 


Sodium Level


 


 130 mmol/L


(136-145) 


 





 


Potassium Level


 


 4.0 mmol/L


(3.5-5.1) 


 





 


Chloride Level


 


 96 mmol/L


() 


 





 


Carbon Dioxide Level


 


 25 mmol/L


(21-32) 


 





 


Anion Gap  9 (6-14)   


 


Blood Urea Nitrogen


 


 15 mg/dL


(7-20) 


 





 


Creatinine


 


 0.9 mg/dL


(0.6-1.0) 


 





 


Estimated GFR


(Cockcroft-Gault) 


 59.1 


 


 





 


Glucose Level


 


 67 mg/dL


(70-99) 


 





 


Calcium Level


 


 8.8 mg/dL


(8.5-10.1) 


 





 


White Blood Count


 


 


 10.1 x10^3/uL


(4.0-11.0) 





 


Red Blood Count


 


 


 3.72 x10^6/uL


(3.50-5.40) 





 


Hemoglobin


 


 


 10.8 g/dL


(12.0-15.5) 





 


Hematocrit


 


 


 31.3 %


(36.0-47.0) 





 


Mean Corpuscular Volume   84 fL ()  


 


Mean Corpuscular Hemoglobin   29 pg (25-35)  


 


Mean Corpuscular Hemoglobin


Concent 


 


 34 g/dL


(31-37) 





 


Red Cell Distribution Width


 


 


 13.3 %


(11.5-14.5) 





 


Platelet Count


 


 


 308 x10^3/uL


(140-400) 





 


Neutrophils (%) (Auto)   69 % (31-73)  


 


Lymphocytes (%) (Auto)   20 % (24-48)  


 


Monocytes (%) (Auto)   9 % (0-9)  


 


Eosinophils (%) (Auto)   1 % (0-3)  


 


Basophils (%) (Auto)   0 % (0-3)  


 


Neutrophils # (Auto)


 


 


 7.0 x10^3/uL


(1.8-7.7) 





 


Lymphocytes # (Auto)


 


 


 2.0 x10^3/uL


(1.0-4.8) 





 


Monocytes # (Auto)


 


 


 0.9 x10^3/uL


(0.0-1.1) 





 


Eosinophils # (Auto)


 


 


 0.1 x10^3/uL


(0.0-0.7) 





 


Basophils # (Auto)


 


 


 0.0 x10^3/uL


(0.0-0.2) 





 


Glucose (Fingerstick)


 


 


 


 74 mg/dL


(70-99)


 


Test


 8/20/19


11:36 8/20/19


16:35 8/20/19


17:17 8/20/19


21:05


 


Glucose (Fingerstick)


 249 mg/dL


(70-99) 


 178 mg/dL


(70-99) 148 mg/dL


(70-99)


 


Urine Random Sodium


 


 <60 mmol/L


(Not Estab.) 


 





 


Test


 8/21/19


04:30 8/21/19


07:38 


 





 


White Blood Count


 8.6 x10^3/uL


(4.0-11.0) 


 


 





 


Red Blood Count


 3.69 x10^6/uL


(3.50-5.40) 


 


 





 


Hemoglobin


 10.6 g/dL


(12.0-15.5) 


 


 





 


Hematocrit


 30.9 %


(36.0-47.0) 


 


 





 


Mean Corpuscular Volume 84 fL ()    


 


Mean Corpuscular Hemoglobin 29 pg (25-35)    


 


Mean Corpuscular Hemoglobin


Concent 34 g/dL


(31-37) 


 


 





 


Red Cell Distribution Width


 13.2 %


(11.5-14.5) 


 


 





 


Platelet Count


 292 x10^3/uL


(140-400) 


 


 





 


Neutrophils (%) (Auto) 68 % (31-73)    


 


Lymphocytes (%) (Auto) 20 % (24-48)    


 


Monocytes (%) (Auto) 10 % (0-9)    


 


Eosinophils (%) (Auto) 2 % (0-3)    


 


Basophils (%) (Auto) 0 % (0-3)    


 


Neutrophils # (Auto)


 5.8 x10^3/uL


(1.8-7.7) 


 


 





 


Lymphocytes # (Auto)


 1.7 x10^3/uL


(1.0-4.8) 


 


 





 


Monocytes # (Auto)


 0.8 x10^3/uL


(0.0-1.1) 


 


 





 


Eosinophils # (Auto)


 0.2 x10^3/uL


(0.0-0.7) 


 


 





 


Basophils # (Auto)


 0.0 x10^3/uL


(0.0-0.2) 


 


 





 


Sodium Level


 130 mmol/L


(136-145) 


 


 





 


Potassium Level


 3.9 mmol/L


(3.5-5.1) 


 


 





 


Chloride Level


 96 mmol/L


() 


 


 





 


Carbon Dioxide Level


 25 mmol/L


(21-32) 


 


 





 


Anion Gap 9 (6-14)    


 


Blood Urea Nitrogen


 19 mg/dL


(7-20) 


 


 





 


Creatinine


 1.0 mg/dL


(0.6-1.0) 


 


 





 


Estimated GFR


(Cockcroft-Gault) 52.3 


 


 


 





 


BUN/Creatinine Ratio 19 (6-20)    


 


Glucose Level


 119 mg/dL


(70-99) 


 


 





 


Calcium Level


 8.7 mg/dL


(8.5-10.1) 


 


 





 


Total Bilirubin


 0.5 mg/dL


(0.2-1.0) 


 


 





 


Aspartate Amino Transf


(AST/SGOT) 20 U/L (15-37) 


 


 


 





 


Alanine Aminotransferase


(ALT/SGPT) 21 U/L (14-59) 


 


 


 





 


Alkaline Phosphatase


 65 U/L


() 


 


 





 


Total Protein


 6.6 g/dL


(6.4-8.2) 


 


 





 


Albumin


 3.0 g/dL


(3.4-5.0) 


 


 





 


Albumin/Globulin Ratio 0.8 (1.0-1.7)    


 


Triglycerides Level


 134 mg/dL


(0-150) 


 


 





 


Cholesterol Level


 183 mg/dL


(0-200) 


 


 





 


LDL Cholesterol, Calculated


 107 mg/dL


(0-100) 


 


 





 


VLDL Cholesterol, Calculated


 27 mg/dL


(0-40) 


 


 





 


Non-HDL Cholesterol Calculated


 134 mg/dL


(0-129) 


 


 





 


HDL Cholesterol


 49 mg/dL


(40-60) 


 


 





 


Cholesterol/HDL Ratio 3.7    


 


Glucose (Fingerstick)


 


 113 mg/dL


(70-99) 


 











Laboratory Tests








Test


 8/20/19


11:36 8/20/19


16:35 8/20/19


17:17 8/20/19


21:05


 


Glucose (Fingerstick)


 249 mg/dL


(70-99) 


 178 mg/dL


(70-99) 148 mg/dL


(70-99)


 


Urine Random Sodium


 


 <60 mmol/L


(Not Estab.) 


 





 


Test


 8/21/19


04:30 8/21/19


07:38 


 





 


White Blood Count


 8.6 x10^3/uL


(4.0-11.0) 


 


 





 


Red Blood Count


 3.69 x10^6/uL


(3.50-5.40) 


 


 





 


Hemoglobin


 10.6 g/dL


(12.0-15.5) 


 


 





 


Hematocrit


 30.9 %


(36.0-47.0) 


 


 





 


Mean Corpuscular Volume 84 fL ()    


 


Mean Corpuscular Hemoglobin 29 pg (25-35)    


 


Mean Corpuscular Hemoglobin


Concent 34 g/dL


(31-37) 


 


 





 


Red Cell Distribution Width


 13.2 %


(11.5-14.5) 


 


 





 


Platelet Count


 292 x10^3/uL


(140-400) 


 


 





 


Neutrophils (%) (Auto) 68 % (31-73)    


 


Lymphocytes (%) (Auto) 20 % (24-48)    


 


Monocytes (%) (Auto) 10 % (0-9)    


 


Eosinophils (%) (Auto) 2 % (0-3)    


 


Basophils (%) (Auto) 0 % (0-3)    


 


Neutrophils # (Auto)


 5.8 x10^3/uL


(1.8-7.7) 


 


 





 


Lymphocytes # (Auto)


 1.7 x10^3/uL


(1.0-4.8) 


 


 





 


Monocytes # (Auto)


 0.8 x10^3/uL


(0.0-1.1) 


 


 





 


Eosinophils # (Auto)


 0.2 x10^3/uL


(0.0-0.7) 


 


 





 


Basophils # (Auto)


 0.0 x10^3/uL


(0.0-0.2) 


 


 





 


Sodium Level


 130 mmol/L


(136-145) 


 


 





 


Potassium Level


 3.9 mmol/L


(3.5-5.1) 


 


 





 


Chloride Level


 96 mmol/L


() 


 


 





 


Carbon Dioxide Level


 25 mmol/L


(21-32) 


 


 





 


Anion Gap 9 (6-14)    


 


Blood Urea Nitrogen


 19 mg/dL


(7-20) 


 


 





 


Creatinine


 1.0 mg/dL


(0.6-1.0) 


 


 





 


Estimated GFR


(Cockcroft-Gault) 52.3 


 


 


 





 


BUN/Creatinine Ratio 19 (6-20)    


 


Glucose Level


 119 mg/dL


(70-99) 


 


 





 


Calcium Level


 8.7 mg/dL


(8.5-10.1) 


 


 





 


Total Bilirubin


 0.5 mg/dL


(0.2-1.0) 


 


 





 


Aspartate Amino Transf


(AST/SGOT) 20 U/L (15-37) 


 


 


 





 


Alanine Aminotransferase


(ALT/SGPT) 21 U/L (14-59) 


 


 


 





 


Alkaline Phosphatase


 65 U/L


() 


 


 





 


Total Protein


 6.6 g/dL


(6.4-8.2) 


 


 





 


Albumin


 3.0 g/dL


(3.4-5.0) 


 


 





 


Albumin/Globulin Ratio 0.8 (1.0-1.7)    


 


Triglycerides Level


 134 mg/dL


(0-150) 


 


 





 


Cholesterol Level


 183 mg/dL


(0-200) 


 


 





 


LDL Cholesterol, Calculated


 107 mg/dL


(0-100) 


 


 





 


VLDL Cholesterol, Calculated


 27 mg/dL


(0-40) 


 


 





 


Non-HDL Cholesterol Calculated


 134 mg/dL


(0-129) 


 


 





 


HDL Cholesterol


 49 mg/dL


(40-60) 


 


 





 


Cholesterol/HDL Ratio 3.7    


 


Glucose (Fingerstick)


 


 113 mg/dL


(70-99) 


 











Medications





Current Medications


Aspirin (Ecotrin) 324 mg 1X  ONCE PO  Last administered on 8/19/19at 03:10;  

Start 8/19/19 at 03:30;  Stop 8/19/19 at 03:31;  Status DC


Ondansetron HCl (Zofran) 4 mg PRN Q8HRS  PRN IV NAUSEA/VOMITING 1ST CHOICE;  

Start 8/19/19 at 04:30;  Stop 8/20/19 at 04:29;  Status DC


Sodium Chloride 1,000 ml @  75 mls/hr [id number] IV  Last administered on 8/19/19at

04:41;  Start 8/19/19 at 05:00;  Stop 8/20/19 at 04:59;  Status DC


Lidocaine HCl (Xylocaine-Mpf 1% 2ml Vial) 2 ml STK-MED ONCE .ROUTE ;  Start 

8/19/19 at 09:30;  Stop 8/19/19 at 09:31;  Status DC


Iohexol (Omnipaque 300 Mg/ml) 100 ml STK-MED ONCE .ROUTE ;  Start 8/19/19 at 

09:30;  Stop 8/19/19 at 09:31;  Status DC


Heparin Sodium/ Sodium Chloride 500 ml @  As Directed STK-MED ONCE .ROUTE ;  

Start 8/19/19 at 09:30;  Stop 8/19/19 at 09:31;  Status DC


Fentanyl Citrate (Fentanyl 2ml Vial) 100 mcg STK-MED ONCE .ROUTE ;  Start 

8/19/19 at 09:48;  Stop 8/19/19 at 09:48;  Status DC


Midazolam HCl (Versed) 2 mg STK-MED ONCE .ROUTE ;  Start 8/19/19 at 09:48;  Stop

8/19/19 at 09:48;  Status DC


Heparin Sodium (Porcine) (Heparin Sodium) 10,000 unit STK-MED ONCE .ROUTE ;  

Start 8/19/19 at 09:48;  Stop 8/19/19 at 09:48;  Status DC


Verapamil HCl (Verapamil) 5 mg STK-MED ONCE .ROUTE ;  Start 8/19/19 at 09:48;  

Stop 8/19/19 at 09:48;  Status DC


Nitroglycerin (Nitroglycerin) 200 mcg STK-MED ONCE .ROUTE ;  Start 8/19/19 at 

09:48;  Stop 8/19/19 at 09:49;  Status DC


Iohexol (Omnipaque 300 Mg/ml) 100 ml STK-MED ONCE .ROUTE ;  Start 8/19/19 at 

10:24;  Stop 8/19/19 at 10:24;  Status DC


Nitroglycerin (Nitroglycerin) 200 mcg 1X  ONCE IART  Last administered on 8/19/ 19at 10:54;  Start 8/19/19 at 10:45;  Stop 8/19/19 at 10:46;  Status DC


Verapamil HCl (Verapamil) 2.5 mg 1X  ONCE IART  Last administered on 8/19/19at 

10:54;  Start 8/19/19 at 10:45;  Stop 8/19/19 at 10:46;  Status DC


Heparin Sodium (Porcine) (Heparin Sodium) 2,500 unit 1X  ONCE IART  Last 

administered on 8/19/19at 10:54;  Start 8/19/19 at 10:45;  Stop 8/19/19 at 

10:46;  Status DC


Heparin Sodium/ Sodium Chloride (HEPARIN for ARTERIAL LINE FLUSH) 1,000 unit 1X 

ONCE IART  Last administered on 8/19/19at 10:54;  Start 8/19/19 at 10:45;  Stop 

8/19/19 at 10:46;  Status DC


Midazolam HCl (Versed) 2 mg 1X  ONCE IV  Last administered on 8/19/19at 10:54;  

Start 8/19/19 at 10:45;  Stop 8/19/19 at 10:46;  Status DC


Fentanyl Citrate (Fentanyl 2ml Vial) 100 mcg 1X  ONCE IV  Last administered on 

8/19/19at 10:54;  Start 8/19/19 at 10:45;  Stop 8/19/19 at 10:46;  Status DC


Iohexol (Omnipaque 300 Mg/ml) 100 ml 1X  ONCE IART  Last administered on 

8/19/19at 10:54;  Start 8/19/19 at 10:45;  Stop 8/19/19 at 10:46;  Status DC


Lidocaine HCl (Xylocaine-Mpf 1% 2ml Vial) 2 ml 1X  ONCE INJ  Last administered 

on 8/19/19at 10:54;  Start 8/19/19 at 10:45;  Stop 8/19/19 at 10:46;  Status DC


Info (CONTRAST GIVEN -- Rx MONITORING) 1 each PRN DAILY  PRN MC SEE COMMENTS;  

Start 8/19/19 at 11:00;  Stop 8/21/19 at 10:59


Sodium Chloride 1,000 ml @  100 mls/hr Q10H IV  Last administered on 8/19/19at 

12:18;  Start 8/19/19 at 10:49


Nitroglycerin (Nitrostat) 0.4 mg PRN Q5MIN  PRN SL CHEST PAIN;  Start 8/19/19 at

11:00


Acetaminophen (Tylenol) 325 mg DAILY PO ;  Start 8/20/19 at 09:00


Amlodipine Besylate (Norvasc) 5 mg DAILY PO  Last administered on 8/21/19at 

08:19;  Start 8/20/19 at 09:00


Aspirin (Ecotrin) 325 mg DAILY PO  Last administered on 8/21/19at 08:19;  Start 

8/20/19 at 09:00


Insulin Glargine (Lantus) 17 units QHS SQ  Last administered on 8/20/19at 21:19;

 Start 8/19/19 at 21:00


Lisinopril (Prinivil) 10 mg BID PO  Last administered on 8/21/19at 08:19;  Start

8/19/19 at 21:00


Calcium/Vitamin D (Oscal D 500mg/ 200uts) 1 tab DAILY PO  Last administered on 

8/21/19at 08:19;  Start 8/20/19 at 09:00


Insulin Human Lispro (HumaLOG) 5 units DAILYWBKFT SQ ;  Start 8/19/19 at 17:00


Insulin Human Lispro (HumaLOG) 10 units DAILYWSUP SQ  Last administered on 

8/20/19at 18:01;  Start 8/19/19 at 17:00


Multivitamins (Thera M Plus) 1 tab DAILY PO  Last administered on 8/21/19at 

08:19;  Start 8/20/19 at 09:00


Non-Formulary Medication (Ubidecarenone (Coq-10)) 100 mg DAILY PO ;  Start 

8/20/19 at 09:00;  Status UNV


Vitamin E 200 unit DAILY PO  Last administered on 8/21/19at 08:19;  Start 

8/20/19 at 09:00


Morphine Sulfate (Morphine Sulfate) 2 mg PRN Q4HRS  PRN IV PAIN;  Start 8/19/19 

at 20:15


Tramadol HCl (Ultram) 50 mg PRN Q6HRS  PRN PO PAIN Last administered on 

8/21/19at 08:19;  Start 8/19/19 at 20:15


Insulin Human Lispro (HumaLOG) 0-5 UNITS TIDWMEALS SQ  Last administered on 

8/20/19at 18:01;  Start 8/20/19 at 12:00


Dextrose (Dextrose 50%-Water Syringe) 12.5 gm PRN Q15MIN  PRN IV SEE COMMENTS;  

Start 8/20/19 at 12:15


Dextrose 250 ml PRN Q15MIN  PRN IV SEE COMMENTS;  Start 8/20/19 at 12:15;  Stop 

8/20/19 at 12:11;  Status DC


Acetaminophen (Tylenol) 650 mg PRN Q6HRS  PRN PO TEMP > 100.4F;  Start 8/20/19 

at 14:45


Acetaminophen (Tylenol Supp) 650 mg PRN Q4HRS  PRN ME TEMP > 100.4F;  Start 

8/20/19 at 14:45


Aspirin (Ecotrin) 325 mg DAILYWBKFT PO ;  Start 8/21/19 at 08:00;  Status Cancel


Aspirin (Aspirin Rectal Supp) 300 mg PRN DAILY  PRN ME IF UNABLE TO TAKE PO;  

Start 8/20/19 at 14:45


Atorvastatin Calcium (Lipitor) 20 mg QHS PO ;  Start 8/21/19 at 21:00





Active Scripts


Active


Reported


Novolog Flexpen (Insulin Aspart) 100 Unit/1 Ml Insuln.pen 10 Unit SQ DAILYWSUP


Novolog Flexpen (Insulin Aspart) 100 Unit/1 Ml Insuln.pen 5 Unit SQ DAILYWBKFT


Vitamin E (Vitamin E Acid Succinate) 100 Unit Tablet 100 Unit PO DAILY


Coq-10 (Ubidecarenone) 100 Mg Capsule 100 Mg PO DAILY


Lantus Solostar (Insulin Glargine,Hum.rec.anlog) 100 Unit/1 Ml Insuln.pen 17 

Unit SQ QHS


Caltrate 600 + D Soft Chew Tab (Calcium Carbonate/Vitamin D3) 1 Each Tab.chew 1 

Each PO DAILY


One Daily For Women 50+ Adv Tb (Multivitamins-Min/Fa/Ginkgo) 1 Each Tablet 1 

Each PO DAILY


Tylenol (Acetaminophen) 325 Mg Tablet 325 Mg PO DAILY


Aspirin Ec (Aspirin) 325 Mg Tablet.dr 325 Mg PO DAILY


Amlodipine Besylate 5 Mg Tablet 5 Mg PO DAILY


Zestril (Lisinopril) 10 Mg Tablet 10 Mg PO BID


Metformin Hcl 1,000 Mg Tablet 1,000 Mg PO BIDWMEALS


Vitals/I & O





Vital Sign - Last 24 Hours








 8/20/19 8/20/19 8/20/19 8/20/19





 09:42 09:42 09:42 10:49


 


Pulse 64 64  


 


Resp    14


 


B/P (MAP) 149/66 149/66  


 


Pulse Ox   97 97


 


O2 Delivery   Room Air Room Air





 8/20/19 8/20/19 8/20/19 8/20/19





 11:00 15:00 19:15 19:45


 


Temp 98.2 97.9 98.3 





 98.2 97.9 98.3 


 


Pulse 75 60 83 


 


Resp 16 16 20 


 


B/P (MAP) 139/63 (88) 146/67 (93) 148/67 (94) 


 


Pulse Ox 96 95 96 


 


O2 Delivery Room Air Room Air Room Air Room Air


 


    





    





 8/20/19 8/20/19 8/21/19 8/21/19





 21:19 22:55 01:22 03:11


 


Temp  98.6  98.3





  98.6  98.3


 


Pulse 86 75  72


 


Resp  18  20


 


B/P (MAP) 148/67 148/67 (94)  138/65 (89)


 


Pulse Ox  96  96


 


O2 Delivery  Room Air Room Air Room Air


 


    





    





 8/21/19 8/21/19 8/21/19 8/21/19





 07:00 08:19 08:19 08:19


 


Temp 97.9   





 97.9   


 


Pulse 67  67 67


 


Resp 16   


 


B/P (MAP) 135/61 (85)  135/61 135/61


 


Pulse Ox  96  


 


O2 Delivery Room Air Room Air  


 


O2 Flow Rate  2.0  














Intake and Output   


 


 8/20/19 8/20/19 8/21/19





 14:59 22:59 06:59


 


Intake Total   200 ml


 


Output Total 200 ml 150 ml 200 ml


 


Balance -200 ml -150 ml 0 ml








Images


MRI Brain without contrast


 


History: Syncope, confusion, decreased hearing


 


Technique: Multiplanar, multisequential noncontrast MR imaging was 


performed of the brain.


 


Comparison: None other than CT head exam August 19, 2019


 


Findings: There is focus of restricted diffusion of the anterior left 


cerebellum about 1 cm in size, corresponding T2 and FLAIR hyperintense 


signal. There is also small focus of restricted diffusion of the posterior


medial left cerebellum about 0.6 cm in size, hypointense on ADC map. There


could be a very small focus of restricted diffusion of the right occipital


lobe about 0.4 cm although limited evaluation due to artifact in this 


region. There is a small focus of restricted diffusion of the left 


temporal lobe along the periventricular white matter about 0.5 cm. There 


is a 0.4 cm focus of restricted diffusion of the posterior right centrum 


semiovale. There is probable very small focus of restricted diffusion 


right parietal cortical surface about 0.2 cm otherwise difficult to 


characterize given small size. There is small 0.3 cm focus of somewhat 


faint restricted diffusion of the right frontal lobe and also separate 


small focus of the right frontal cortical surface about 0.3 cm in size.


 


There is preservation of the major arterial intracranial flow voids the 


skull base. There is no midline shift or intra-axial mass effect. There 


are old lacunar infarcts of the left corona radiata/centrum semiovale. 


There is other multifocal moderate to severe T2 and FLAIR hyperintense 


signal abnormality of the supratentorial parenchyma bilaterally. There is 


mild to moderate lateral ventriculomegaly and mild third ventriculomegaly 


probably due to atrophy as there is mild-to-moderate generalized atrophy 


present. There is no significant hemosiderin deposition of the brain 


parenchyma. Mastoid air cells are mostly aerated, small focus of fluid on 


the left. There is patchy minimal ethmoid air cell mucosal thickening. 


There has been lens surgery bilaterally. Cerebellar tonsils are normal in 


location. There is preservation of marrow signal of the clivus. There is 


no abnormality of pineal gland or pituitary gland.


 


Impression:


1. There are some scattered small recent acute or subacute infarcts 


bilaterally as stated, largest of the anterior left cerebellum. Given 


bilateral distribution, embolic source should be considered.


2. Other scattered multifocal T2 and FLAIR hyperintense signal abnormality


of the supratentorial parenchyma bilaterally is nonspecific, most commonly


due to chronic microvascular ischemic disease in a patient this age.


3. There is mild-to-moderate generalized supratentorial atrophy.





Carotids:


The peak systolic velocity within the right common carotid 


artery is 75 cm/sec. The peak systolic velocity within the right internal 


carotid artery is 111 cm/sec and the end diastolic velocity within the 


right internal carotid artery is 27 cm/sec. The right ICA/CCA ratio is 


1.50.


 


The peak systolic velocity within the left common carotid artery is 1 


heart and 14 cm/sec. The peak systolic velocity within the left internal 


carotid artery is 98 cm/sec and the end diastolic velocity within the left


internal carotid artery is 20 cm/sec. The left ICA/CCA ratio is 1.05.


 


There is normal antegrade flow within both vertebral arteries.


 


IMPRESSION: No Doppler evidence of hemodynamically significant stenosis 


within the carotid or vertebral arteries.





Echo:


LEFT VENTRICLE 


The left ventricle is normal size. There is mild concentric left ventricular 

hypertrophy. The left ventricle is hyperdynamic. The Ejection Fraction is >65%. 

There is normal LV segmental wall motion. Transmitral Doppler flow pattern is 

abnormal.





 RIGHT VENTRICLE 


The right ventricle is normal size. There is normal right ventricular wall 

thickness. The right ventricular systolic function is normal.





 ATRIA 


The left atrium size is normal. The right atrium size is normal. The interatrial

septum is intact with no evidence for an atrial septal defect or patent foramen 

ovale as noted on 2-D or Doppler imaging.





 AORTIC VALVE 


The aortic valve is mildly calcified. The aortic valve is trileaflet. Doppler 

and Color Flow revealed no significant aortic regurgitation. There is mild 

valvular aortic stenosis. Calculated aortic valve area is 1.4 cm2 with maximum 

pressure gradient of 14 mmHg and mean pressure gradient of 10 mmHg.





 MITRAL VALVE 


Mitral annular calcification is moderate. There is no evidence of mitral valve 

prolapse. There is no mitral valve stenosis. Doppler and Color-flow revealed 

trace to mild mitral regurgitation.





 TRICUSPID VALVE 


The tricuspid valve is normal in structure and function. Doppler and Color Flow 

revealed trace tricuspid regurgitation. The PA pressure was estimated at 26 

mmHg. There is no tricuspid valve prolapse or vegetation. There is no tricuspid 

valve stenosis.





 PULMONIC VALVE 


The pulmonic valve is not well visualized.





 GREAT VESSELS 


The aortic root is normal in size. The ascending aorta is normal in size. The 

IVC is normal in size and collapses >50% with inspiration.





 PERICARDIAL EFFUSION 


There is no evidence of significant pericardial effusion.





Critical Notification


Critical Value: No





<Conclusion>


The left ventricle is normal size.


The left ventricle is hyperdynamic.


The Ejection Fraction is >65%.


There is mild concentric left ventricular hypertrophy.


There is mild valvular aortic stenosis. 


Calculated aortic valve area is 1.4 cm2 with maximum pressure gradient of 14 

mmHg and mean pressure gradient of 10 mmHg.


Doppler and Color Flow revealed no significant aortic regurgitation.


Doppler and Color-flow revealed trace to mild mitral regurgitation.


Doppler and Color Flow revealed trace tricuspid regurgitation.


The PA pressure was estimated at 26 mmHg.











JEAN BUTLER MD           Aug 21, 2019 08:43

## 2019-08-21 NOTE — NUR
SS following up with discharge planning. Pt accepted at [facility]. 
SS will continue to follow for discharge planning.

## 2019-08-21 NOTE — PDOC
Provider Note


Provider Note


Transesophageal echocardiogram did not show any evidence of intracardiac thro

mbus.


Plan for event monitor to rule out atrial fibrillation as an outpatient.











JOSE COTO MD           Aug 21, 2019 16:37

## 2019-08-21 NOTE — PDOC
Provider Note


Provider Note


NORBERT this afternoon to rule out any cardiac source in regards to his stroke.. 

Risks and benefits explained and agreeable top proceed.











BRANDI VÁZQUEZ APRN          Aug 21, 2019 12:20

## 2019-08-21 NOTE — PDOC
PROGRESS NOTES


History of Present Illness


History of Present Illness





VTE Prophylaxis Ordered


VTE Prophylaxis Devices:  No


VTE Pharmacological Prophylaxi:  Yes





Assessment/Plan


Assessment/Plan





impression





1.  Nonobstructive coronary artery disease


2.  Hyperdynamic left ventricle systolic function with ejection fraction 

estimated at 80%


3. non-STEMI is most probably type 2/demand ischemia. 


4. 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 

white 


matter change.


5. diabetes


6. hypertension


7. dizziness


8. dyspnea


9. on echo Ejection Fraction is >65%.


10.There is mild concentric left ventricular hypertrophy.


11. mild valvular aortic stenosis. 


by echo . 


Calculated aortic valve area is 1.4 cm2 with maximum pressure gradient of 14 mm

Hg and mean pressure gradient of 10 mmHg.


12.  shortness of breath //probably anginal equivalent. Chest x-ray did not show

any evidence of congestive heart failure.


13.  new onset confusion, memory loss, metabolic encephalopathy


14. component of hypertensive encephalopathy, possible concussion


15, CVA, MULTIPLE areas


focus of restricted diffusion of the anterior left 


cerebellum about 1 cm in size, corresponding T2 and FLAIR hyperintense 


signal. There is also small focus of restricted diffusion of the posterior


medial left cerebellum about 0.6 cm in size, hypointense on ADC map. There


could be a very small focus of restricted diffusion of the right occipital


lobe about 0.4 cm although limited evaluation due to artifact in this 


region. There is a small focus of restricted diffusion of the left 


temporal lobe along the periventricular white matter about 0.5 cm. There 


is a 0.4 cm focus of restricted diffusion of the posterior right centrum 


semiovale. There is probable very small focus of restricted diffusion 


right parietal cortical surface about 0.2 cm otherwise difficult to 


characterize given small size. There is small 0.3 cm focus of somewhat 


faint restricted diffusion of the right frontal lobe and also separate 


small focus of the right frontal cortical surface about 0.3 cm in size.





plan





cont  cvc 


cardiac cath  reviewed


medical management.


accuchecks


risk reduction mgt


cardiology consulted


pt/ot


neurology consult


mri head 


event monitor, possible NORBERT


Add clopidogrel, discontinue aspirin after a week


Discharge Recommendations 


* Skilled Nursing Unit











41 min pt exam, chart review, > 50% of time spent with exam, chart review, pt 

care coordination





Vitals


Vitals





Vital Signs








  Date Time  Temp Pulse Resp B/P (MAP) Pulse Ox O2 Delivery O2 Flow Rate FiO2


 


8/21/19 10:52 98.0 71 16 167/74 (105) 95 Room Air  





 98.0       


 


8/21/19 09:21       2.0 











Physical Exam


General:  Alert, Cooperative, No acute distress


Heart:  Regular rate, Normal S1, Other (ESM LUPSB)


Lungs:  Clear


Abdomen:  Normal bowel sounds, Soft, No tenderness


Extremities:  No cyanosis


Skin:  No significant lesion





Labs


LABS





Laboratory Tests








Test


 8/20/19


11:36 8/20/19


16:35 8/20/19


17:17 8/20/19


21:05


 


Glucose (Fingerstick)


 249 mg/dL


(70-99) 


 178 mg/dL


(70-99) 148 mg/dL


(70-99)


 


Urine Random Sodium


 


 <60 mmol/L


(Not Estab.) 


 





 


Test


 8/21/19


04:30 8/21/19


07:38 


 





 


White Blood Count


 8.6 x10^3/uL


(4.0-11.0) 


 


 





 


Red Blood Count


 3.69 x10^6/uL


(3.50-5.40) 


 


 





 


Hemoglobin


 10.6 g/dL


(12.0-15.5) 


 


 





 


Hematocrit


 30.9 %


(36.0-47.0) 


 


 





 


Mean Corpuscular Volume 84 fL ()    


 


Mean Corpuscular Hemoglobin 29 pg (25-35)    


 


Mean Corpuscular Hemoglobin


Concent 34 g/dL


(31-37) 


 


 





 


Red Cell Distribution Width


 13.2 %


(11.5-14.5) 


 


 





 


Platelet Count


 292 x10^3/uL


(140-400) 


 


 





 


Neutrophils (%) (Auto) 68 % (31-73)    


 


Lymphocytes (%) (Auto) 20 % (24-48)    


 


Monocytes (%) (Auto) 10 % (0-9)    


 


Eosinophils (%) (Auto) 2 % (0-3)    


 


Basophils (%) (Auto) 0 % (0-3)    


 


Neutrophils # (Auto)


 5.8 x10^3/uL


(1.8-7.7) 


 


 





 


Lymphocytes # (Auto)


 1.7 x10^3/uL


(1.0-4.8) 


 


 





 


Monocytes # (Auto)


 0.8 x10^3/uL


(0.0-1.1) 


 


 





 


Eosinophils # (Auto)


 0.2 x10^3/uL


(0.0-0.7) 


 


 





 


Basophils # (Auto)


 0.0 x10^3/uL


(0.0-0.2) 


 


 





 


Sodium Level


 130 mmol/L


(136-145) 


 


 





 


Potassium Level


 3.9 mmol/L


(3.5-5.1) 


 


 





 


Chloride Level


 96 mmol/L


() 


 


 





 


Carbon Dioxide Level


 25 mmol/L


(21-32) 


 


 





 


Anion Gap 9 (6-14)    


 


Blood Urea Nitrogen


 19 mg/dL


(7-20) 


 


 





 


Creatinine


 1.0 mg/dL


(0.6-1.0) 


 


 





 


Estimated GFR


(Cockcroft-Gault) 52.3 


 


 


 





 


BUN/Creatinine Ratio 19 (6-20)    


 


Glucose Level


 119 mg/dL


(70-99) 


 


 





 


Calcium Level


 8.7 mg/dL


(8.5-10.1) 


 


 





 


Total Bilirubin


 0.5 mg/dL


(0.2-1.0) 


 


 





 


Aspartate Amino Transf


(AST/SGOT) 20 U/L (15-37) 


 


 


 





 


Alanine Aminotransferase


(ALT/SGPT) 21 U/L (14-59) 


 


 


 





 


Alkaline Phosphatase


 65 U/L


() 


 


 





 


Total Protein


 6.6 g/dL


(6.4-8.2) 


 


 





 


Albumin


 3.0 g/dL


(3.4-5.0) 


 


 





 


Albumin/Globulin Ratio 0.8 (1.0-1.7)    


 


Triglycerides Level


 134 mg/dL


(0-150) 


 


 





 


Cholesterol Level


 183 mg/dL


(0-200) 


 


 





 


LDL Cholesterol, Calculated


 107 mg/dL


(0-100) 


 


 





 


VLDL Cholesterol, Calculated


 27 mg/dL


(0-40) 


 


 





 


Non-HDL Cholesterol Calculated


 134 mg/dL


(0-129) 


 


 





 


HDL Cholesterol


 49 mg/dL


(40-60) 


 


 





 


Cholesterol/HDL Ratio 3.7    


 


Glucose (Fingerstick)


 


 113 mg/dL


(70-99) 


 














Assessment and Plan


Assessmemt and Plan


Problems


Medical Problems:


(1) Diabetes


Status: Chronic  





(2) Dyspnea


Status: Acute  





(3) HTN (hypertension)


Status: Chronic  





(4) Hypertensive encephalopathy


Status: Acute  





(5) Metabolic encephalopathy


Status: Acute  





(6) NSTEMI (non-ST elevated myocardial infarction)


Status: Acute  





(7) Valvular stenosis, aortic


Status: Chronic  





* Yes


Patient condition at conclusion of therapy 


* Pt in bed


* Bed alarm on


* Call light in reach


* PtIn no apparent distress


* Pt denies further needs


* Visitor with patient


Communicated Patient Care With (Name, Title) 


* Elaina RN; HANNY Ramirez


Goal 1 - Bed Mobility Assistance Required 


* Independent


Goal 2 - Transfers Assistance Required 


* Independent


Goal 2 - Transfer Type 


* Sit to Stand


Goal 3 - Ambulation Assistance Required 


* Independent


Goal 3 - Ambulation Distance 


* 250'


Goal 3 - Ambulation Device 


* Cane


Goal 4 - Stairs Assistance Required 


* Independent


Goal 4 - Number of Stairs 


* 2-4


Goal 4 - Device on Stairs 


* Cane


Treatment Plan 


* Therapeutic Exercise


* Bed Mobility Training


* Transfer training


* Gait Training


* Dynamic Balance Training


Frequency of Treatment Expected 


* 10 visits/week


Duration of Treatment Expected 


* 2 weeks


Discharge Recommendations 


* Skilled Nursing Unit





Comment


Review of Relevant


I have reviewed the following items lorenzo (where applicable) has been applied.


Labs





Laboratory Tests








Test


 8/19/19


12:30 8/19/19


17:22 8/19/19


20:10 8/19/19


21:30


 


Glucose (Fingerstick)


 96 mg/dL


(70-99) 135 mg/dL


(70-99) 85 mg/dL


(70-99) 





 


Urine Collection Type    Unknown 


 


Urine Color    Straw 


 


Urine Clarity    Clear 


 


Urine pH    6.5 


 


Urine Specific Gravity    1.010 


 


Urine Protein


 


 


 


 30 mg/dL


(NEG-TRACE)


 


Urine Glucose (UA)


 


 


 


 Negative mg/dL


(NEG)


 


Urine Ketones (Stick)


 


 


 


 Negative mg/dL


(NEG)


 


Urine Blood    Negative (NEG) 


 


Urine Nitrite    Negative (NEG) 


 


Urine Bilirubin    Negative (NEG) 


 


Urine Urobilinogen Dipstick


 


 


 


 0.2 mg/dL (0.2


mg/dL)


 


Urine Leukocyte Esterase    Negative (NEG) 


 


Urine RBC    0 /HPF (0-2) 


 


Urine WBC


 


 


 


 Rare /HPF


(0-4)


 


Urine Squamous Epithelial


Cells 


 


 


 Few /LPF 





 


Urine Bacteria


 


 


 


 Few /HPF


(0-FEW)


 


Test


 8/20/19


06:15 8/20/19


06:25 8/20/19


07:22 8/20/19


11:36


 


Sodium Level


 130 mmol/L


(136-145) 


 


 





 


Potassium Level


 4.0 mmol/L


(3.5-5.1) 


 


 





 


Chloride Level


 96 mmol/L


() 


 


 





 


Carbon Dioxide Level


 25 mmol/L


(21-32) 


 


 





 


Anion Gap 9 (6-14)    


 


Blood Urea Nitrogen


 15 mg/dL


(7-20) 


 


 





 


Creatinine


 0.9 mg/dL


(0.6-1.0) 


 


 





 


Estimated GFR


(Cockcroft-Gault) 59.1 


 


 


 





 


Glucose Level


 67 mg/dL


(70-99) 


 


 





 


Calcium Level


 8.8 mg/dL


(8.5-10.1) 


 


 





 


White Blood Count


 


 10.1 x10^3/uL


(4.0-11.0) 


 





 


Red Blood Count


 


 3.72 x10^6/uL


(3.50-5.40) 


 





 


Hemoglobin


 


 10.8 g/dL


(12.0-15.5) 


 





 


Hematocrit


 


 31.3 %


(36.0-47.0) 


 





 


Mean Corpuscular Volume  84 fL ()   


 


Mean Corpuscular Hemoglobin  29 pg (25-35)   


 


Mean Corpuscular Hemoglobin


Concent 


 34 g/dL


(31-37) 


 





 


Red Cell Distribution Width


 


 13.3 %


(11.5-14.5) 


 





 


Platelet Count


 


 308 x10^3/uL


(140-400) 


 





 


Neutrophils (%) (Auto)  69 % (31-73)   


 


Lymphocytes (%) (Auto)  20 % (24-48)   


 


Monocytes (%) (Auto)  9 % (0-9)   


 


Eosinophils (%) (Auto)  1 % (0-3)   


 


Basophils (%) (Auto)  0 % (0-3)   


 


Neutrophils # (Auto)


 


 7.0 x10^3/uL


(1.8-7.7) 


 





 


Lymphocytes # (Auto)


 


 2.0 x10^3/uL


(1.0-4.8) 


 





 


Monocytes # (Auto)


 


 0.9 x10^3/uL


(0.0-1.1) 


 





 


Eosinophils # (Auto)


 


 0.1 x10^3/uL


(0.0-0.7) 


 





 


Basophils # (Auto)


 


 0.0 x10^3/uL


(0.0-0.2) 


 





 


Glucose (Fingerstick)


 


 


 74 mg/dL


(70-99) 249 mg/dL


(70-99)


 


Test


 8/20/19


16:35 8/20/19


17:17 8/20/19


21:05 8/21/19


04:30


 


Urine Random Sodium


 <60 mmol/L


(Not Estab.) 


 


 





 


Glucose (Fingerstick)


 


 178 mg/dL


(70-99) 148 mg/dL


(70-99) 





 


White Blood Count


 


 


 


 8.6 x10^3/uL


(4.0-11.0)


 


Red Blood Count


 


 


 


 3.69 x10^6/uL


(3.50-5.40)


 


Hemoglobin


 


 


 


 10.6 g/dL


(12.0-15.5)


 


Hematocrit


 


 


 


 30.9 %


(36.0-47.0)


 


Mean Corpuscular Volume    84 fL () 


 


Mean Corpuscular Hemoglobin    29 pg (25-35) 


 


Mean Corpuscular Hemoglobin


Concent 


 


 


 34 g/dL


(31-37)


 


Red Cell Distribution Width


 


 


 


 13.2 %


(11.5-14.5)


 


Platelet Count


 


 


 


 292 x10^3/uL


(140-400)


 


Neutrophils (%) (Auto)    68 % (31-73) 


 


Lymphocytes (%) (Auto)    20 % (24-48) 


 


Monocytes (%) (Auto)    10 % (0-9) 


 


Eosinophils (%) (Auto)    2 % (0-3) 


 


Basophils (%) (Auto)    0 % (0-3) 


 


Neutrophils # (Auto)


 


 


 


 5.8 x10^3/uL


(1.8-7.7)


 


Lymphocytes # (Auto)


 


 


 


 1.7 x10^3/uL


(1.0-4.8)


 


Monocytes # (Auto)


 


 


 


 0.8 x10^3/uL


(0.0-1.1)


 


Eosinophils # (Auto)


 


 


 


 0.2 x10^3/uL


(0.0-0.7)


 


Basophils # (Auto)


 


 


 


 0.0 x10^3/uL


(0.0-0.2)


 


Sodium Level


 


 


 


 130 mmol/L


(136-145)


 


Potassium Level


 


 


 


 3.9 mmol/L


(3.5-5.1)


 


Chloride Level


 


 


 


 96 mmol/L


()


 


Carbon Dioxide Level


 


 


 


 25 mmol/L


(21-32)


 


Anion Gap    9 (6-14) 


 


Blood Urea Nitrogen


 


 


 


 19 mg/dL


(7-20)


 


Creatinine


 


 


 


 1.0 mg/dL


(0.6-1.0)


 


Estimated GFR


(Cockcroft-Gault) 


 


 


 52.3 





 


BUN/Creatinine Ratio    19 (6-20) 


 


Glucose Level


 


 


 


 119 mg/dL


(70-99)


 


Calcium Level


 


 


 


 8.7 mg/dL


(8.5-10.1)


 


Total Bilirubin


 


 


 


 0.5 mg/dL


(0.2-1.0)


 


Aspartate Amino Transf


(AST/SGOT) 


 


 


 20 U/L (15-37) 





 


Alanine Aminotransferase


(ALT/SGPT) 


 


 


 21 U/L (14-59) 





 


Alkaline Phosphatase


 


 


 


 65 U/L


()


 


Total Protein


 


 


 


 6.6 g/dL


(6.4-8.2)


 


Albumin


 


 


 


 3.0 g/dL


(3.4-5.0)


 


Albumin/Globulin Ratio    0.8 (1.0-1.7) 


 


Triglycerides Level


 


 


 


 134 mg/dL


(0-150)


 


Cholesterol Level


 


 


 


 183 mg/dL


(0-200)


 


LDL Cholesterol, Calculated


 


 


 


 107 mg/dL


(0-100)


 


VLDL Cholesterol, Calculated


 


 


 


 27 mg/dL


(0-40)


 


Non-HDL Cholesterol Calculated


 


 


 


 134 mg/dL


(0-129)


 


HDL Cholesterol


 


 


 


 49 mg/dL


(40-60)


 


Cholesterol/HDL Ratio    3.7 


 


Test


 8/21/19


07:38 


 


 





 


Glucose (Fingerstick)


 113 mg/dL


(70-99) 


 


 











Laboratory Tests








Test


 8/20/19


11:36 8/20/19


16:35 8/20/19


17:17 8/20/19


21:05


 


Glucose (Fingerstick)


 249 mg/dL


(70-99) 


 178 mg/dL


(70-99) 148 mg/dL


(70-99)


 


Urine Random Sodium


 


 <60 mmol/L


(Not Estab.) 


 





 


Test


 8/21/19


04:30 8/21/19


07:38 


 





 


White Blood Count


 8.6 x10^3/uL


(4.0-11.0) 


 


 





 


Red Blood Count


 3.69 x10^6/uL


(3.50-5.40) 


 


 





 


Hemoglobin


 10.6 g/dL


(12.0-15.5) 


 


 





 


Hematocrit


 30.9 %


(36.0-47.0) 


 


 





 


Mean Corpuscular Volume 84 fL ()    


 


Mean Corpuscular Hemoglobin 29 pg (25-35)    


 


Mean Corpuscular Hemoglobin


Concent 34 g/dL


(31-37) 


 


 





 


Red Cell Distribution Width


 13.2 %


(11.5-14.5) 


 


 





 


Platelet Count


 292 x10^3/uL


(140-400) 


 


 





 


Neutrophils (%) (Auto) 68 % (31-73)    


 


Lymphocytes (%) (Auto) 20 % (24-48)    


 


Monocytes (%) (Auto) 10 % (0-9)    


 


Eosinophils (%) (Auto) 2 % (0-3)    


 


Basophils (%) (Auto) 0 % (0-3)    


 


Neutrophils # (Auto)


 5.8 x10^3/uL


(1.8-7.7) 


 


 





 


Lymphocytes # (Auto)


 1.7 x10^3/uL


(1.0-4.8) 


 


 





 


Monocytes # (Auto)


 0.8 x10^3/uL


(0.0-1.1) 


 


 





 


Eosinophils # (Auto)


 0.2 x10^3/uL


(0.0-0.7) 


 


 





 


Basophils # (Auto)


 0.0 x10^3/uL


(0.0-0.2) 


 


 





 


Sodium Level


 130 mmol/L


(136-145) 


 


 





 


Potassium Level


 3.9 mmol/L


(3.5-5.1) 


 


 





 


Chloride Level


 96 mmol/L


() 


 


 





 


Carbon Dioxide Level


 25 mmol/L


(21-32) 


 


 





 


Anion Gap 9 (6-14)    


 


Blood Urea Nitrogen


 19 mg/dL


(7-20) 


 


 





 


Creatinine


 1.0 mg/dL


(0.6-1.0) 


 


 





 


Estimated GFR


(Cockcroft-Gault) 52.3 


 


 


 





 


BUN/Creatinine Ratio 19 (6-20)    


 


Glucose Level


 119 mg/dL


(70-99) 


 


 





 


Calcium Level


 8.7 mg/dL


(8.5-10.1) 


 


 





 


Total Bilirubin


 0.5 mg/dL


(0.2-1.0) 


 


 





 


Aspartate Amino Transf


(AST/SGOT) 20 U/L (15-37) 


 


 


 





 


Alanine Aminotransferase


(ALT/SGPT) 21 U/L (14-59) 


 


 


 





 


Alkaline Phosphatase


 65 U/L


() 


 


 





 


Total Protein


 6.6 g/dL


(6.4-8.2) 


 


 





 


Albumin


 3.0 g/dL


(3.4-5.0) 


 


 





 


Albumin/Globulin Ratio 0.8 (1.0-1.7)    


 


Triglycerides Level


 134 mg/dL


(0-150) 


 


 





 


Cholesterol Level


 183 mg/dL


(0-200) 


 


 





 


LDL Cholesterol, Calculated


 107 mg/dL


(0-100) 


 


 





 


VLDL Cholesterol, Calculated


 27 mg/dL


(0-40) 


 


 





 


Non-HDL Cholesterol Calculated


 134 mg/dL


(0-129) 


 


 





 


HDL Cholesterol


 49 mg/dL


(40-60) 


 


 





 


Cholesterol/HDL Ratio 3.7    


 


Glucose (Fingerstick)


 


 113 mg/dL


(70-99) 


 











Medications





Current Medications


Aspirin (Ecotrin) 324 mg 1X  ONCE PO  Last administered on 8/19/19at 03:10;  

Start 8/19/19 at 03:30;  Stop 8/19/19 at 03:31;  Status DC


Ondansetron HCl (Zofran) 4 mg PRN Q8HRS  PRN IV NAUSEA/VOMITING 1ST CHOICE;  

Start 8/19/19 at 04:30;  Stop 8/20/19 at 04:29;  Status DC


Sodium Chloride 1,000 ml @  75 mls/hr [id number] IV  Last administered on 8/19/19at

04:41;  Start 8/19/19 at 05:00;  Stop 8/20/19 at 04:59;  Status DC


Lidocaine HCl (Xylocaine-Mpf 1% 2ml Vial) 2 ml STK-MED ONCE .ROUTE ;  Start 

8/19/19 at 09:30;  Stop 8/19/19 at 09:31;  Status DC


Iohexol (Omnipaque 300 Mg/ml) 100 ml STK-MED ONCE .ROUTE ;  Start 8/19/19 at 

09:30;  Stop 8/19/19 at 09:31;  Status DC


Heparin Sodium/ Sodium Chloride 500 ml @  As Directed STK-MED ONCE .ROUTE ;  

Start 8/19/19 at 09:30;  Stop 8/19/19 at 09:31;  Status DC


Fentanyl Citrate (Fentanyl 2ml Vial) 100 mcg STK-MED ONCE .ROUTE ;  Start 

8/19/19 at 09:48;  Stop 8/19/19 at 09:48;  Status DC


Midazolam HCl (Versed) 2 mg STK-MED ONCE .ROUTE ;  Start 8/19/19 at 09:48;  Stop

8/19/19 at 09:48;  Status DC


Heparin Sodium (Porcine) (Heparin Sodium) 10,000 unit STK-MED ONCE .ROUTE ;  

Start 8/19/19 at 09:48;  Stop 8/19/19 at 09:48;  Status DC


Verapamil HCl (Verapamil) 5 mg STK-MED ONCE .ROUTE ;  Start 8/19/19 at 09:48;  

Stop 8/19/19 at 09:48;  Status DC


Nitroglycerin (Nitroglycerin) 200 mcg STK-MED ONCE .ROUTE ;  Start 8/19/19 at 

09:48;  Stop 8/19/19 at 09:49;  Status DC


Iohexol (Omnipaque 300 Mg/ml) 100 ml STK-MED ONCE .ROUTE ;  Start 8/19/19 at 

10:24;  Stop 8/19/19 at 10:24;  Status DC


Nitroglycerin (Nitroglycerin) 200 mcg 1X  ONCE IART  Last administered on 

8/19/19at 10:54;  Start 8/19/19 at 10:45;  Stop 8/19/19 at 10:46;  Status DC


Verapamil HCl (Verapamil) 2.5 mg 1X  ONCE IART  Last administered on 8/19/19at 

10:54;  Start 8/19/19 at 10:45;  Stop 8/19/19 at 10:46;  Status DC


Heparin Sodium (Porcine) (Heparin Sodium) 2,500 unit 1X  ONCE IART  Last 

administered on 8/19/19at 10:54;  Start 8/19/19 at 10:45;  Stop 8/19/19 at 

10:46;  Status DC


Heparin Sodium/ Sodium Chloride (HEPARIN for ARTERIAL LINE FLUSH) 1,000 unit 1X 

ONCE IART  Last administered on 8/19/19at 10:54;  Start 8/19/19 at 10:45;  Stop 

8/19/19 at 10:46;  Status DC


Midazolam HCl (Versed) 2 mg 1X  ONCE IV  Last administered on 8/19/19at 10:54;  

Start 8/19/19 at 10:45;  Stop 8/19/19 at 10:46;  Status DC


Fentanyl Citrate (Fentanyl 2ml Vial) 100 mcg 1X  ONCE IV  Last administered on 

8/19/19at 10:54;  Start 8/19/19 at 10:45;  Stop 8/19/19 at 10:46;  Status DC


Iohexol (Omnipaque 300 Mg/ml) 100 ml 1X  ONCE IART  Last administered on 

8/19/19at 10:54;  Start 8/19/19 at 10:45;  Stop 8/19/19 at 10:46;  Status DC


Lidocaine HCl (Xylocaine-Mpf 1% 2ml Vial) 2 ml 1X  ONCE INJ  Last administered 

on 8/19/19at 10:54;  Start 8/19/19 at 10:45;  Stop 8/19/19 at 10:46;  Status DC


Info (CONTRAST GIVEN -- Rx MONITORING) 1 each PRN DAILY  PRN MC SEE COMMENTS;  

Start 8/19/19 at 11:00;  Stop 8/21/19 at 10:59;  Status DC


Sodium Chloride 1,000 ml @  100 mls/hr Q10H IV  Last administered on 8/19/19at 

12:18;  Start 8/19/19 at 10:49


Nitroglycerin (Nitrostat) 0.4 mg PRN Q5MIN  PRN SL CHEST PAIN;  Start 8/19/19 at

11:00


Acetaminophen (Tylenol) 325 mg DAILY PO ;  Start 8/20/19 at 09:00


Amlodipine Besylate (Norvasc) 5 mg DAILY PO  Last administered on 8/21/19at 

08:19;  Start 8/20/19 at 09:00


Aspirin (Ecotrin) 325 mg DAILY PO  Last administered on 8/21/19at 08:19;  Start 

8/20/19 at 09:00;  Stop 8/21/19 at 09:00;  Status DC


Insulin Glargine (Lantus) 17 units QHS SQ  Last administered on 8/20/19at 21:19;

 Start 8/19/19 at 21:00


Lisinopril (Prinivil) 10 mg BID PO  Last administered on 8/21/19at 08:19;  Start

8/19/19 at 21:00


Calcium/Vitamin D (Oscal D 500mg/ 200uts) 1 tab DAILY PO  Last administered on 

8/21/19at 08:19;  Start 8/20/19 at 09:00


Insulin Human Lispro (HumaLOG) 5 units DAILYWBKFT SQ ;  Start 8/19/19 at 17:00


Insulin Human Lispro (HumaLOG) 10 units DAILYWSUP SQ  Last administered on 

8/20/19at 18:01;  Start 8/19/19 at 17:00


Multivitamins (Thera M Plus) 1 tab DAILY PO  Last administered on 8/21/19at 

08:19;  Start 8/20/19 at 09:00


Non-Formulary Medication (Ubidecarenone (Coq-10)) 100 mg DAILY PO ;  Start 

8/20/19 at 09:00;  Status UNV


Vitamin E 200 unit DAILY PO  Last administered on 8/21/19at 08:19;  Start 

8/20/19 at 09:00


Morphine Sulfate (Morphine Sulfate) 2 mg PRN Q4HRS  PRN IV PAIN;  Start 8/19/19 

at 20:15


Tramadol HCl (Ultram) 50 mg PRN Q6HRS  PRN PO PAIN Last administered on 

8/21/19at 08:19;  Start 8/19/19 at 20:15


Insulin Human Lispro (HumaLOG) 0-5 UNITS TIDWMEALS SQ  Last administered on 

8/20/19at 18:01;  Start 8/20/19 at 12:00


Dextrose (Dextrose 50%-Water Syringe) 12.5 gm PRN Q15MIN  PRN IV SEE COMMENTS;  

Start 8/20/19 at 12:15


Dextrose 250 ml PRN Q15MIN  PRN IV SEE COMMENTS;  Start 8/20/19 at 12:15;  Stop 

8/20/19 at 12:11;  Status DC


Acetaminophen (Tylenol) 650 mg PRN Q6HRS  PRN PO TEMP > 100.4F;  Start 8/20/19 

at 14:45


Acetaminophen (Tylenol Supp) 650 mg PRN Q4HRS  PRN DE TEMP > 100.4F;  Start 

8/20/19 at 14:45


Aspirin (Ecotrin) 325 mg DAILYWBKFT PO ;  Start 8/21/19 at 08:00;  Status Cancel


Aspirin (Aspirin Rectal Supp) 300 mg PRN DAILY  PRN DE IF UNABLE TO TAKE PO;  

Start 8/20/19 at 14:45;  Stop 8/21/19 at 09:00;  Status DC


Atorvastatin Calcium (Lipitor) 20 mg QHS PO ;  Start 8/21/19 at 21:00


Aspirin (Ecotrin) 81 mg DAILY PO ;  Start 8/21/19 at 09:00


Clopidogrel Bisulfate (Plavix) 75 mg DAILYWBKFT PO  Last administered on 

8/21/19at 09:21;  Start 8/21/19 at 09:00


Benzocaine (Hurricaine One) 3 spray 1X  ONCE MM ;  Start 8/21/19 at 10:15;  Stop

8/21/19 at 10:19;  Status DC


Lidocaine HCl (Xylocaine 2% Topical 30gm Tube) 1 aroldo 1X  ONCE TP ;  Start 

8/21/19 at 10:15;  Stop 8/21/19 at 10:19;  Status DC


Lidocaine HCl (Viscous Lidocaine) 15 ml 1X  ONCE SWSW ;  Start 8/21/19 at 10:15;

 Stop 8/21/19 at 10:19;  Status DC


Ondansetron HCl (Zofran) 4 mg PRN Q6HRS  PRN IV NAUSEA/VOMITING;  Start 8/21/19 

at 10:45;  Stop 8/22/19 at 10:44


Fentanyl Citrate (Fentanyl 2ml Vial) 25 mcg PRN Q5MIN  PRN IV MILD PAIN 1-3;  

Start 8/21/19 at 10:45;  Stop 8/22/19 at 10:44


Fentanyl Citrate (Fentanyl 2ml Vial) 50 mcg PRN Q5MIN  PRN IV MODERATE TO SEVERE

PAIN;  Start 8/21/19 at 10:45;  Stop 8/22/19 at 10:44


Morphine Sulfate (Morphine Sulfate) 1 mg PRN Q10MIN  PRN IV SEVERE PAIN 7-10;  

Start 8/21/19 at 10:45;  Stop 8/22/19 at 10:44


Ringer's Solution 1,000 ml @  30 mls/hr Q24H IV ;  Start 8/21/19 at 10:41;  Stop

8/21/19 at 22:40


Lidocaine HCl (Xylocaine-Mpf 1% 2ml Vial) 2 ml PRN 1X  PRN ID PRIOR TO IV START;

 Start 8/21/19 at 10:45;  Stop 8/22/19 at 10:44


Hydromorphone HCl (Dilaudid) 0.5 mg PRN Q10MIN  PRN IV SEV PAIN, Second choice; 

Start 8/21/19 at 10:45;  Stop 8/22/19 at 10:44


Prochlorperazine Edisylate (Compazine) 5 mg PACU PRN  PRN IV NAUSEA, MRX1;  

Start 8/21/19 at 10:45;  Stop 8/22/19 at 10:44





Active Scripts


Active


Reported


Novolog Flexpen (Insulin Aspart) 100 Unit/1 Ml Insuln.pen 10 Unit SQ DAILYWSUP


Novolog Flexpen (Insulin Aspart) 100 Unit/1 Ml Insuln.pen 5 Unit SQ DAILYWBKFT


Vitamin E (Vitamin E Acid Succinate) 100 Unit Tablet 100 Unit PO DAILY


Coq-10 (Ubidecarenone) 100 Mg Capsule 100 Mg PO DAILY


Lantus Solostar (Insulin Glargine,Hum.rec.anlog) 100 Unit/1 Ml Insuln.pen 17 

Unit SQ QHS


Caltrate 600 + D Soft Chew Tab (Calcium Carbonate/Vitamin D3) 1 Each Tab.chew 1 

Each PO DAILY


One Daily For Women 50+ Adv Tb (Multivitamins-Min/Fa/Ginkgo) 1 Each Tablet 1 

Each PO DAILY


Tylenol (Acetaminophen) 325 Mg Tablet 325 Mg PO DAILY


Aspirin Ec (Aspirin) 325 Mg Tablet.dr 325 Mg PO DAILY


Amlodipine Besylate 5 Mg Tablet 5 Mg PO DAILY


Zestril (Lisinopril) 10 Mg Tablet 10 Mg PO BID


Metformin Hcl 1,000 Mg Tablet 1,000 Mg PO BIDWMEALS


Vitals/I & O





Vital Sign - Last 24 Hours








 8/20/19 8/20/19 8/20/19 8/20/19





 15:00 19:15 19:45 21:19


 


Temp 97.9 98.3  





 97.9 98.3  


 


Pulse 60 83  86


 


Resp 16 20  


 


B/P (MAP) 146/67 (93) 148/67 (94)  148/67


 


Pulse Ox 95 96  


 


O2 Delivery Room Air Room Air Room Air 


 


    





    





 8/20/19 8/21/19 8/21/19 8/21/19





 22:55 01:22 03:11 07:00


 


Temp 98.6  98.3 97.9





 98.6  98.3 97.9


 


Pulse 75  72 67


 


Resp 18  20 16


 


B/P (MAP) 148/67 (94)  138/65 (89) 135/61 (85)


 


Pulse Ox 96  96 


 


O2 Delivery Room Air Room Air Room Air Room Air


 


    





    





 8/21/19 8/21/19 8/21/19 8/21/19





 08:00 08:19 08:19 08:19


 


Pulse   67 67


 


B/P (MAP)   135/61 135/61


 


Pulse Ox  96  


 


O2 Delivery Room Air Room Air  


 


O2 Flow Rate 2.0 2.0  





 8/21/19 8/21/19  





 09:21 10:52  


 


Temp  98.0  





  98.0  


 


Pulse  71  


 


Resp  16  


 


B/P (MAP)  167/74 (105)  


 


Pulse Ox 96 95  


 


O2 Delivery Room Air Room Air  


 


O2 Flow Rate 2.0   














Intake and Output   


 


 8/20/19 8/20/19 8/21/19





 15:00 23:00 07:00


 


Intake Total   200 ml


 


Output Total 200 ml 150 ml 200 ml


 


Balance -200 ml -150 ml 0 ml

















SOFIA PAUL MD          Aug 21, 2019 11:11

## 2019-08-22 VITALS — SYSTOLIC BLOOD PRESSURE: 161 MMHG | DIASTOLIC BLOOD PRESSURE: 59 MMHG

## 2019-08-22 VITALS — SYSTOLIC BLOOD PRESSURE: 150 MMHG | DIASTOLIC BLOOD PRESSURE: 65 MMHG

## 2019-08-22 VITALS — SYSTOLIC BLOOD PRESSURE: 152 MMHG | DIASTOLIC BLOOD PRESSURE: 58 MMHG

## 2019-08-22 VITALS — SYSTOLIC BLOOD PRESSURE: 152 MMHG | DIASTOLIC BLOOD PRESSURE: 67 MMHG

## 2019-08-22 LAB
ALBUMIN SERPL-MCNC: 3 G/DL (ref 3.4–5)
ALBUMIN/GLOB SERPL: 0.8 {RATIO} (ref 1–1.7)
ALP SERPL-CCNC: 64 U/L (ref 46–116)
ALT SERPL-CCNC: 20 U/L (ref 14–59)
ANION GAP SERPL CALC-SCNC: 9 MMOL/L (ref 6–14)
AST SERPL-CCNC: 21 U/L (ref 15–37)
BASOPHILS # BLD AUTO: 0 X10^3/UL (ref 0–0.2)
BASOPHILS NFR BLD: 0 % (ref 0–3)
BILIRUB SERPL-MCNC: 0.5 MG/DL (ref 0.2–1)
BUN SERPL-MCNC: 19 MG/DL (ref 7–20)
BUN/CREAT SERPL: 19 (ref 6–20)
CALCIUM SERPL-MCNC: 8.6 MG/DL (ref 8.5–10.1)
CHLORIDE SERPL-SCNC: 100 MMOL/L (ref 98–107)
CO2 SERPL-SCNC: 25 MMOL/L (ref 21–32)
CREAT SERPL-MCNC: 1 MG/DL (ref 0.6–1)
EOSINOPHIL NFR BLD: 0.1 X10^3/UL (ref 0–0.7)
EOSINOPHIL NFR BLD: 2 % (ref 0–3)
ERYTHROCYTE [DISTWIDTH] IN BLOOD BY AUTOMATED COUNT: 13.3 % (ref 11.5–14.5)
GFR SERPLBLD BASED ON 1.73 SQ M-ARVRAT: 52.3 ML/MIN
GLOBULIN SER-MCNC: 3.6 G/DL (ref 2.2–3.8)
GLUCOSE SERPL-MCNC: 118 MG/DL (ref 70–99)
HCT VFR BLD CALC: 30.7 % (ref 36–47)
HGB BLD-MCNC: 10.6 G/DL (ref 12–15.5)
LYMPHOCYTES # BLD: 1.1 X10^3/UL (ref 1–4.8)
LYMPHOCYTES NFR BLD AUTO: 14 % (ref 24–48)
MCH RBC QN AUTO: 29 PG (ref 25–35)
MCHC RBC AUTO-ENTMCNC: 35 G/DL (ref 31–37)
MCV RBC AUTO: 84 FL (ref 79–100)
MONO #: 0.7 X10^3/UL (ref 0–1.1)
MONOCYTES NFR BLD: 9 % (ref 0–9)
NEUT #: 5.8 X10^3/UL (ref 1.8–7.7)
NEUTROPHILS NFR BLD AUTO: 75 % (ref 31–73)
PLATELET # BLD AUTO: 292 X10^3/UL (ref 140–400)
POTASSIUM SERPL-SCNC: 4.1 MMOL/L (ref 3.5–5.1)
PROT SERPL-MCNC: 6.6 G/DL (ref 6.4–8.2)
RBC # BLD AUTO: 3.65 X10^6/UL (ref 3.5–5.4)
SODIUM SERPL-SCNC: 134 MMOL/L (ref 136–145)
WBC # BLD AUTO: 7.8 X10^3/UL (ref 4–11)

## 2019-08-22 RX ADMIN — CALCIUM CARBONATE-VITAMIN D TAB 500 MG-200 UNIT SCH TAB: 500-200 TAB at 08:49

## 2019-08-22 RX ADMIN — INSULIN LISPRO SCH UNITS: 100 INJECTION, SOLUTION INTRAVENOUS; SUBCUTANEOUS at 08:00

## 2019-08-22 RX ADMIN — MULTIPLE VITAMINS W/ MINERALS TAB SCH TAB: TAB at 08:50

## 2019-08-22 RX ADMIN — INSULIN LISPRO SCH UNITS: 100 INJECTION, SOLUTION INTRAVENOUS; SUBCUTANEOUS at 09:10

## 2019-08-22 RX ADMIN — LISINOPRIL SCH MG: 10 TABLET ORAL at 08:51

## 2019-08-22 RX ADMIN — CLOPIDOGREL BISULFATE SCH MG: 75 TABLET ORAL at 08:49

## 2019-08-22 RX ADMIN — SODIUM CHLORIDE SCH MLS/HR: 450 INJECTION, SOLUTION INTRAVENOUS at 08:52

## 2019-08-22 RX ADMIN — INSULIN LISPRO SCH UNITS: 100 INJECTION, SOLUTION INTRAVENOUS; SUBCUTANEOUS at 12:36

## 2019-08-22 RX ADMIN — ACETAMINOPHEN SCH MG: 325 TABLET, FILM COATED ORAL at 08:50

## 2019-08-22 RX ADMIN — Medication SCH UNIT: at 08:49

## 2019-08-22 NOTE — SNU/HH DC
DISCHARGE ORDERS


DISCHARGE INFORMATION:


FINAL DIAGNOSIS


Problems


Medical Problems:


(1) Diabetes


Status: Chronic  





(2) Dyspnea


Status: Acute  





(3) HTN (hypertension)


Status: Chronic  





(4) Hypertensive encephalopathy


Status: Acute  





(5) Metabolic encephalopathy


Status: Acute  





(6) NSTEMI (non-ST elevated myocardial infarction)


Status: Acute  





(7) Valvular stenosis, aortic


Status: Chronic  








CONDITION ON DISCHARGE:  Stable





CODE STATUS:


Code Status:  Full





SKILLED NURSING:


SNF STAY <30 DAYS:  Yes





HOSPICE:


HOSPICE:  No


HOSPICE EVAL & TREAT:  No





LTAC:


ADMIT TO LTAC:  No





TREATMENT/EQUIPMENT ORDERS:


ADAPTIVE EQUIPMENT NEEDED:  Front wheeled walker


Physical Therapy For:  Evalulation/Treatment


Occupational Therapy For:  Evaluation/Treatment


Speech Language Pathology For:  Evaluation/Treatment





DISCHARGE MEDICATIONS:


Home Meds


Active Scripts


Aspirin (ASPIRIN EC) 81 Mg Tablet.dr, 81 MG PO DAILYWBKFT for cva for 30 Days, 

#30 TAB.SR


   Prov:SOFIA PAUL MD         8/22/19


Atorvastatin Calcium (ATORVASTATIN CALCIUM) 20 Mg Tablet, 20 MG PO QHS for 

cholesterol for 30 Days, #30 TAB


   Prov:SOFIA PAUL MD         8/22/19


Clopidogrel Bisulfate (CLOPIDOGREL) 75 Mg Tablet, 75 MG PO DAILYWBKFT for cva 

for 30 Days, #30 TAB


   Prov:SOFIA PAUL MD         8/22/19


Reported Medications


Insulin Aspart (NOVOLOG FLEXPEN) 100 Unit/1 Ml Insuln.pen, 10 UNIT SQ DAILYWSUP 

for  , SYR


   8/19/19


Insulin Aspart (NOVOLOG FLEXPEN) 100 Unit/1 Ml Insuln.pen, 5 UNIT SQ DAILYWBKFT 

for  , SYR


   8/19/19


Vitamin E Acid Succinate (VITAMIN E) 100 Unit Tablet, 100 UNIT PO DAILY for  , 

TAB


   8/19/19


Ubidecarenone (COQ-10) 100 Mg Capsule, 100 MG PO DAILY for  , CAP


   8/19/19


Insulin Glargine,Hum.rec.anlog (LANTUS SOLOSTAR) 100 Unit/1 Ml Insuln.pen, 17 

UNIT SQ QHS for  , #15 ML 3 Refills


   8/19/19


Calcium Carbonate/Vitamin D3 (CALTRATE 600 + D SOFT CHEW TAB) 1 Each Tab.chew, 1

EACH PO DAILY for  , TAB.CHEW


   8/19/19


Multivitamins-Min/Fa/Ginkgo (ONE DAILY FOR WOMEN 50+ ADV TB) 1 Each Tablet, 1 

EACH PO DAILY for  , TAB


   8/19/19


Acetaminophen (TYLENOL) 325 Mg Tablet, 325 MG PO DAILY for  , TAB


   8/19/19


Amlodipine Besylate (AMLODIPINE BESYLATE) 5 Mg Tablet, 5 MG PO DAILY for  , TAB


   8/19/19


Lisinopril (ZESTRIL) 10 Mg Tablet, 10 MG PO BID for FOR HYPERTENSION, #30 TAB 0 

Refills


   8/19/19


Discontinued Reported Medications


Aspirin (ASPIRIN EC) 325 Mg Tablet., 325 MG PO DAILY for  , TAB.SR


   8/19/19


Metformin Hcl (METFORMIN HCL) 1,000 Mg Tablet, 1000 MG PO BIDWMEALS for  , TAB


   8/19/19











SOFIA PAUL MD          Aug 22, 2019 15:45

## 2019-08-22 NOTE — NUR
SS following up with discharge planning. Discharge orders received for skilled nursing unit. 
SS phoned and faxed discharge orders to [facility], [phone]; fax 
[phone]. Pt will discharge today and go to [facility] between 
1730 and 1800. Pt, pt's son, and pt's RN notified.

## 2019-08-22 NOTE — PDOC
PROGRESS NOTES


History of Present Illness


History of Present Illness





VTE Prophylaxis Ordered


VTE Prophylaxis Devices:  No


VTE Pharmacological Prophylaxi:  Yes





discharge dx ==============








Assessment/Plan





impression





1.  Nonobstructive coronary artery disease


2.  Hyperdynamic left ventricle systolic function with ejection fraction 

estimated at 80%


3. non-STEMI is most probably type 2/demand ischemia. 


4. 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 

white 


matter change.


5. diabetes


6. hypertension


7. dizziness


8. dyspnea


9. on echo Ejection Fraction is >65%.


10.There is mild concentric left ventricular hypertrophy.


11. mild valvular aortic stenosis. 


by echo . 


Calculated aortic valve area is 1.4 cm2 with maximum pressure gradient of 14 

mmHg and mean pressure gradient of 10 mmHg.


12.  shortness of breath //probably anginal equivalent. Chest x-ray did not show

any evidence of congestive heart failure.


13.  new onset confusion, memory loss, metabolic encephalopathy


14. component of hypertensive encephalopathy, possible concussion


15, CVA, MULTIPLE areas


focus of restricted diffusion of the anterior left 


cerebellum about 1 cm in size, corresponding T2 and FLAIR hyperintense 


signal. There is also small focus of restricted diffusion of the posterior


medial left cerebellum about 0.6 cm in size, hypointense on ADC map. There


could be a very small focus of restricted diffusion of the right occipital


lobe about 0.4 cm although limited evaluation due to artifact in this 


region. There is a small focus of restricted diffusion of the left 


temporal lobe along the periventricular white matter about 0.5 cm. There 


is a 0.4 cm focus of restricted diffusion of the posterior right centrum 


semiovale. There is probable very small focus of restricted diffusion 


right parietal cortical surface about 0.2 cm otherwise difficult to 


characterize given small size. There is small 0.3 cm focus of somewhat 


faint restricted diffusion of the right frontal lobe and also separate 


small focus of the right frontal cortical surface about 0.3 cm in size.





plan





cont  cvc 


cardiac cath  reviewed


medical management.


accuchecks


risk reduction mgt


cardiology consulted


pt/ot


neurology consult


mri head 


event monitor, possible NORBERT


Add clopidogrel, discontinue aspirin after a week


Discharge Recommendations 


* Skilled Nursing Unit











31 min pt exam, chart review d/c planning  , > 50% of time spent with exam, 

chart review, pt care coordination





Vitals


Vitals





Vital Signs








  Date Time  Temp Pulse Resp B/P (MAP) Pulse Ox O2 Delivery O2 Flow Rate FiO2


 


8/22/19 08:59     97 Room Air  


 


8/22/19 08:59  77  150/65    


 


8/22/19 07:00 97.8  18     





 97.8       


 


8/21/19 16:31       2.0 











Physical Exam


General:  Alert, Cooperative, No acute distress


Heart:  Regular rate, Normal S1, Normal S2, Other (ESM LUPSB)


Lungs:  Clear


Abdomen:  Normal bowel sounds, Soft, No tenderness


Extremities:  No clubbing, No cyanosis


Skin:  No significant lesion





Labs


LABS





Laboratory Tests








Test


 8/21/19


12:41 8/21/19


17:36 8/21/19


20:47 8/22/19


03:40


 


Glucose (Fingerstick)


 162 mg/dL


(70-99) 160 mg/dL


(70-99) 144 mg/dL


(70-99) 





 


White Blood Count


 


 


 


 7.8 x10^3/uL


(4.0-11.0)


 


Red Blood Count


 


 


 


 3.65 x10^6/uL


(3.50-5.40)


 


Hemoglobin


 


 


 


 10.6 g/dL


(12.0-15.5)


 


Hematocrit


 


 


 


 30.7 %


(36.0-47.0)


 


Mean Corpuscular Volume    84 fL () 


 


Mean Corpuscular Hemoglobin    29 pg (25-35) 


 


Mean Corpuscular Hemoglobin


Concent 


 


 


 35 g/dL


(31-37)


 


Red Cell Distribution Width


 


 


 


 13.3 %


(11.5-14.5)


 


Platelet Count


 


 


 


 292 x10^3/uL


(140-400)


 


Neutrophils (%) (Auto)    75 % (31-73) 


 


Lymphocytes (%) (Auto)    14 % (24-48) 


 


Monocytes (%) (Auto)    9 % (0-9) 


 


Eosinophils (%) (Auto)    2 % (0-3) 


 


Basophils (%) (Auto)    0 % (0-3) 


 


Neutrophils # (Auto)


 


 


 


 5.8 x10^3/uL


(1.8-7.7)


 


Lymphocytes # (Auto)


 


 


 


 1.1 x10^3/uL


(1.0-4.8)


 


Monocytes # (Auto)


 


 


 


 0.7 x10^3/uL


(0.0-1.1)


 


Eosinophils # (Auto)


 


 


 


 0.1 x10^3/uL


(0.0-0.7)


 


Basophils # (Auto)


 


 


 


 0.0 x10^3/uL


(0.0-0.2)


 


Test


 8/22/19


03:45 8/22/19


07:22 


 





 


Sodium Level


 134 mmol/L


(136-145) 


 


 





 


Potassium Level


 4.1 mmol/L


(3.5-5.1) 


 


 





 


Chloride Level


 100 mmol/L


() 


 


 





 


Carbon Dioxide Level


 25 mmol/L


(21-32) 


 


 





 


Anion Gap 9 (6-14)    


 


Blood Urea Nitrogen


 19 mg/dL


(7-20) 


 


 





 


Creatinine


 1.0 mg/dL


(0.6-1.0) 


 


 





 


Estimated GFR


(Cockcroft-Gault) 52.3 


 


 


 





 


BUN/Creatinine Ratio 19 (6-20)    


 


Glucose Level


 118 mg/dL


(70-99) 


 


 





 


Calcium Level


 8.6 mg/dL


(8.5-10.1) 


 


 





 


Total Bilirubin


 0.5 mg/dL


(0.2-1.0) 


 


 





 


Aspartate Amino Transf


(AST/SGOT) 21 U/L (15-37) 


 


 


 





 


Alanine Aminotransferase


(ALT/SGPT) 20 U/L (14-59) 


 


 


 





 


Alkaline Phosphatase


 64 U/L


() 


 


 





 


Total Protein


 6.6 g/dL


(6.4-8.2) 


 


 





 


Albumin


 3.0 g/dL


(3.4-5.0) 


 


 





 


Albumin/Globulin Ratio 0.8 (1.0-1.7)    


 


Glucose (Fingerstick)


 


 121 mg/dL


(70-99) 


 














Assessment and Plan


Assessmemt and Plan


Problems


Medical Problems:


(1) Diabetes


Status: Chronic  





(2) Dyspnea


Status: Acute  





(3) HTN (hypertension)


Status: Chronic  





(4) Hypertensive encephalopathy


Status: Acute  





(5) Metabolic encephalopathy


Status: Acute  





(6) NSTEMI (non-ST elevated myocardial infarction)


Status: Acute  





(7) Valvular stenosis, aortic


Status: Chronic  











Comment


Review of Relevant


I have reviewed the following items lorenzo (where applicable) has been applied.


Labs





Laboratory Tests








Test


 8/20/19


11:36 8/20/19


16:35 8/20/19


17:17 8/20/19


21:05


 


Glucose (Fingerstick)


 249 mg/dL


(70-99) 


 178 mg/dL


(70-99) 148 mg/dL


(70-99)


 


Urine Random Sodium


 


 <60 mmol/L


(Not Estab.) 


 





 


Test


 8/21/19


04:30 8/21/19


07:38 8/21/19


12:41 8/21/19


17:36


 


White Blood Count


 8.6 x10^3/uL


(4.0-11.0) 


 


 





 


Red Blood Count


 3.69 x10^6/uL


(3.50-5.40) 


 


 





 


Hemoglobin


 10.6 g/dL


(12.0-15.5) 


 


 





 


Hematocrit


 30.9 %


(36.0-47.0) 


 


 





 


Mean Corpuscular Volume 84 fL ()    


 


Mean Corpuscular Hemoglobin 29 pg (25-35)    


 


Mean Corpuscular Hemoglobin


Concent 34 g/dL


(31-37) 


 


 





 


Red Cell Distribution Width


 13.2 %


(11.5-14.5) 


 


 





 


Platelet Count


 292 x10^3/uL


(140-400) 


 


 





 


Neutrophils (%) (Auto) 68 % (31-73)    


 


Lymphocytes (%) (Auto) 20 % (24-48)    


 


Monocytes (%) (Auto) 10 % (0-9)    


 


Eosinophils (%) (Auto) 2 % (0-3)    


 


Basophils (%) (Auto) 0 % (0-3)    


 


Neutrophils # (Auto)


 5.8 x10^3/uL


(1.8-7.7) 


 


 





 


Lymphocytes # (Auto)


 1.7 x10^3/uL


(1.0-4.8) 


 


 





 


Monocytes # (Auto)


 0.8 x10^3/uL


(0.0-1.1) 


 


 





 


Eosinophils # (Auto)


 0.2 x10^3/uL


(0.0-0.7) 


 


 





 


Basophils # (Auto)


 0.0 x10^3/uL


(0.0-0.2) 


 


 





 


Sodium Level


 130 mmol/L


(136-145) 


 


 





 


Potassium Level


 3.9 mmol/L


(3.5-5.1) 


 


 





 


Chloride Level


 96 mmol/L


() 


 


 





 


Carbon Dioxide Level


 25 mmol/L


(21-32) 


 


 





 


Anion Gap 9 (6-14)    


 


Blood Urea Nitrogen


 19 mg/dL


(7-20) 


 


 





 


Creatinine


 1.0 mg/dL


(0.6-1.0) 


 


 





 


Estimated GFR


(Cockcroft-Gault) 52.3 


 


 


 





 


BUN/Creatinine Ratio 19 (6-20)    


 


Glucose Level


 119 mg/dL


(70-99) 


 


 





 


Calcium Level


 8.7 mg/dL


(8.5-10.1) 


 


 





 


Total Bilirubin


 0.5 mg/dL


(0.2-1.0) 


 


 





 


Aspartate Amino Transf


(AST/SGOT) 20 U/L (15-37) 


 


 


 





 


Alanine Aminotransferase


(ALT/SGPT) 21 U/L (14-59) 


 


 


 





 


Alkaline Phosphatase


 65 U/L


() 


 


 





 


Total Protein


 6.6 g/dL


(6.4-8.2) 


 


 





 


Albumin


 3.0 g/dL


(3.4-5.0) 


 


 





 


Albumin/Globulin Ratio 0.8 (1.0-1.7)    


 


Triglycerides Level


 134 mg/dL


(0-150) 


 


 





 


Cholesterol Level


 183 mg/dL


(0-200) 


 


 





 


LDL Cholesterol, Calculated


 107 mg/dL


(0-100) 


 


 





 


VLDL Cholesterol, Calculated


 27 mg/dL


(0-40) 


 


 





 


Non-HDL Cholesterol Calculated


 134 mg/dL


(0-129) 


 


 





 


HDL Cholesterol


 49 mg/dL


(40-60) 


 


 





 


Cholesterol/HDL Ratio 3.7    


 


Glucose (Fingerstick)


 


 113 mg/dL


(70-99) 162 mg/dL


(70-99) 160 mg/dL


(70-99)


 


Test


 8/21/19


20:47 8/22/19


03:40 8/22/19


03:45 8/22/19


07:22


 


Glucose (Fingerstick)


 144 mg/dL


(70-99) 


 


 121 mg/dL


(70-99)


 


White Blood Count


 


 7.8 x10^3/uL


(4.0-11.0) 


 





 


Red Blood Count


 


 3.65 x10^6/uL


(3.50-5.40) 


 





 


Hemoglobin


 


 10.6 g/dL


(12.0-15.5) 


 





 


Hematocrit


 


 30.7 %


(36.0-47.0) 


 





 


Mean Corpuscular Volume  84 fL ()   


 


Mean Corpuscular Hemoglobin  29 pg (25-35)   


 


Mean Corpuscular Hemoglobin


Concent 


 35 g/dL


(31-37) 


 





 


Red Cell Distribution Width


 


 13.3 %


(11.5-14.5) 


 





 


Platelet Count


 


 292 x10^3/uL


(140-400) 


 





 


Neutrophils (%) (Auto)  75 % (31-73)   


 


Lymphocytes (%) (Auto)  14 % (24-48)   


 


Monocytes (%) (Auto)  9 % (0-9)   


 


Eosinophils (%) (Auto)  2 % (0-3)   


 


Basophils (%) (Auto)  0 % (0-3)   


 


Neutrophils # (Auto)


 


 5.8 x10^3/uL


(1.8-7.7) 


 





 


Lymphocytes # (Auto)


 


 1.1 x10^3/uL


(1.0-4.8) 


 





 


Monocytes # (Auto)


 


 0.7 x10^3/uL


(0.0-1.1) 


 





 


Eosinophils # (Auto)


 


 0.1 x10^3/uL


(0.0-0.7) 


 





 


Basophils # (Auto)


 


 0.0 x10^3/uL


(0.0-0.2) 


 





 


Sodium Level


 


 


 134 mmol/L


(136-145) 





 


Potassium Level


 


 


 4.1 mmol/L


(3.5-5.1) 





 


Chloride Level


 


 


 100 mmol/L


() 





 


Carbon Dioxide Level


 


 


 25 mmol/L


(21-32) 





 


Anion Gap   9 (6-14)  


 


Blood Urea Nitrogen


 


 


 19 mg/dL


(7-20) 





 


Creatinine


 


 


 1.0 mg/dL


(0.6-1.0) 





 


Estimated GFR


(Cockcroft-Gault) 


 


 52.3 


 





 


BUN/Creatinine Ratio   19 (6-20)  


 


Glucose Level


 


 


 118 mg/dL


(70-99) 





 


Calcium Level


 


 


 8.6 mg/dL


(8.5-10.1) 





 


Total Bilirubin


 


 


 0.5 mg/dL


(0.2-1.0) 





 


Aspartate Amino Transf


(AST/SGOT) 


 


 21 U/L (15-37) 


 





 


Alanine Aminotransferase


(ALT/SGPT) 


 


 20 U/L (14-59) 


 





 


Alkaline Phosphatase


 


 


 64 U/L


() 





 


Total Protein


 


 


 6.6 g/dL


(6.4-8.2) 





 


Albumin


 


 


 3.0 g/dL


(3.4-5.0) 





 


Albumin/Globulin Ratio   0.8 (1.0-1.7)  








Laboratory Tests








Test


 8/21/19


12:41 8/21/19


17:36 8/21/19


20:47 8/22/19


03:40


 


Glucose (Fingerstick)


 162 mg/dL


(70-99) 160 mg/dL


(70-99) 144 mg/dL


(70-99) 





 


White Blood Count


 


 


 


 7.8 x10^3/uL


(4.0-11.0)


 


Red Blood Count


 


 


 


 3.65 x10^6/uL


(3.50-5.40)


 


Hemoglobin


 


 


 


 10.6 g/dL


(12.0-15.5)


 


Hematocrit


 


 


 


 30.7 %


(36.0-47.0)


 


Mean Corpuscular Volume    84 fL () 


 


Mean Corpuscular Hemoglobin    29 pg (25-35) 


 


Mean Corpuscular Hemoglobin


Concent 


 


 


 35 g/dL


(31-37)


 


Red Cell Distribution Width


 


 


 


 13.3 %


(11.5-14.5)


 


Platelet Count


 


 


 


 292 x10^3/uL


(140-400)


 


Neutrophils (%) (Auto)    75 % (31-73) 


 


Lymphocytes (%) (Auto)    14 % (24-48) 


 


Monocytes (%) (Auto)    9 % (0-9) 


 


Eosinophils (%) (Auto)    2 % (0-3) 


 


Basophils (%) (Auto)    0 % (0-3) 


 


Neutrophils # (Auto)


 


 


 


 5.8 x10^3/uL


(1.8-7.7)


 


Lymphocytes # (Auto)


 


 


 


 1.1 x10^3/uL


(1.0-4.8)


 


Monocytes # (Auto)


 


 


 


 0.7 x10^3/uL


(0.0-1.1)


 


Eosinophils # (Auto)


 


 


 


 0.1 x10^3/uL


(0.0-0.7)


 


Basophils # (Auto)


 


 


 


 0.0 x10^3/uL


(0.0-0.2)


 


Test


 8/22/19


03:45 8/22/19


07:22 


 





 


Sodium Level


 134 mmol/L


(136-145) 


 


 





 


Potassium Level


 4.1 mmol/L


(3.5-5.1) 


 


 





 


Chloride Level


 100 mmol/L


() 


 


 





 


Carbon Dioxide Level


 25 mmol/L


(21-32) 


 


 





 


Anion Gap 9 (6-14)    


 


Blood Urea Nitrogen


 19 mg/dL


(7-20) 


 


 





 


Creatinine


 1.0 mg/dL


(0.6-1.0) 


 


 





 


Estimated GFR


(Cockcroft-Gault) 52.3 


 


 


 





 


BUN/Creatinine Ratio 19 (6-20)    


 


Glucose Level


 118 mg/dL


(70-99) 


 


 





 


Calcium Level


 8.6 mg/dL


(8.5-10.1) 


 


 





 


Total Bilirubin


 0.5 mg/dL


(0.2-1.0) 


 


 





 


Aspartate Amino Transf


(AST/SGOT) 21 U/L (15-37) 


 


 


 





 


Alanine Aminotransferase


(ALT/SGPT) 20 U/L (14-59) 


 


 


 





 


Alkaline Phosphatase


 64 U/L


() 


 


 





 


Total Protein


 6.6 g/dL


(6.4-8.2) 


 


 





 


Albumin


 3.0 g/dL


(3.4-5.0) 


 


 





 


Albumin/Globulin Ratio 0.8 (1.0-1.7)    


 


Glucose (Fingerstick)


 


 121 mg/dL


(70-99) 


 











Medications





Current Medications


Aspirin (Ecotrin) 324 mg 1X  ONCE PO  Last administered on 8/19/19at 03:10;  

Start 8/19/19 at 03:30;  Stop 8/19/19 at 03:31;  Status DC


Ondansetron HCl (Zofran) 4 mg PRN Q8HRS  PRN IV NAUSEA/VOMITING 1ST CHOICE;  

Start 8/19/19 at 04:30;  Stop 8/20/19 at 04:29;  Status DC


Sodium Chloride 1,000 ml @  75 mls/hr [id number] IV  Last administered on 8/19/19at

04:41;  Start 8/19/19 at 05:00;  Stop 8/20/19 at 04:59;  Status DC


Lidocaine HCl (Xylocaine-Mpf 1% 2ml Vial) 2 ml STK-MED ONCE .ROUTE ;  Start 8/1 9/19 at 09:30;  Stop 8/19/19 at 09:31;  Status DC


Iohexol (Omnipaque 300 Mg/ml) 100 ml STK-MED ONCE .ROUTE ;  Start 8/19/19 at 0

9:30;  Stop 8/19/19 at 09:31;  Status DC


Heparin Sodium/ Sodium Chloride 500 ml @  As Directed STK-MED ONCE .ROUTE ;  

Start 8/19/19 at 09:30;  Stop 8/19/19 at 09:31;  Status DC


Fentanyl Citrate (Fentanyl 2ml Vial) 100 mcg STK-MED ONCE .ROUTE ;  Start 8/19/ 19 at 09:48;  Stop 8/19/19 at 09:48;  Status DC


Midazolam HCl (Versed) 2 mg STK-MED ONCE .ROUTE ;  Start 8/19/19 at 09:48;  Stop

8/19/19 at 09:48;  Status DC


Heparin Sodium (Porcine) (Heparin Sodium) 10,000 unit STK-MED ONCE .ROUTE ;  

Start 8/19/19 at 09:48;  Stop 8/19/19 at 09:48;  Status DC


Verapamil HCl (Verapamil) 5 mg STK-MED ONCE .ROUTE ;  Start 8/19/19 at 09:48;  

Stop 8/19/19 at 09:48;  Status DC


Nitroglycerin (Nitroglycerin) 200 mcg STK-MED ONCE .ROUTE ;  Start 8/19/19 at 

09:48;  Stop 8/19/19 at 09:49;  Status DC


Iohexol (Omnipaque 300 Mg/ml) 100 ml STK-MED ONCE .ROUTE ;  Start 8/19/19 at 

10:24;  Stop 8/19/19 at 10:24;  Status DC


Nitroglycerin (Nitroglycerin) 200 mcg 1X  ONCE IART  Last administered on 

8/19/19at 10:54;  Start 8/19/19 at 10:45;  Stop 8/19/19 at 10:46;  Status DC


Verapamil HCl (Verapamil) 2.5 mg 1X  ONCE IART  Last administered on 8/19/19at 

10:54;  Start 8/19/19 at 10:45;  Stop 8/19/19 at 10:46;  Status DC


Heparin Sodium (Porcine) (Heparin Sodium) 2,500 unit 1X  ONCE IART  Last 

administered on 8/19/19at 10:54;  Start 8/19/19 at 10:45;  Stop 8/19/19 at 

10:46;  Status DC


Heparin Sodium/ Sodium Chloride (HEPARIN for ARTERIAL LINE FLUSH) 1,000 unit 1X 

ONCE IART  Last administered on 8/19/19at 10:54;  Start 8/19/19 at 10:45;  Stop 

8/19/19 at 10:46;  Status DC


Midazolam HCl (Versed) 2 mg 1X  ONCE IV  Last administered on 8/19/19at 10:54;  

Start 8/19/19 at 10:45;  Stop 8/19/19 at 10:46;  Status DC


Fentanyl Citrate (Fentanyl 2ml Vial) 100 mcg 1X  ONCE IV  Last administered on 

8/19/19at 10:54;  Start 8/19/19 at 10:45;  Stop 8/19/19 at 10:46;  Status DC


Iohexol (Omnipaque 300 Mg/ml) 100 ml 1X  ONCE IART  Last administered on 

8/19/19at 10:54;  Start 8/19/19 at 10:45;  Stop 8/19/19 at 10:46;  Status DC


Lidocaine HCl (Xylocaine-Mpf 1% 2ml Vial) 2 ml 1X  ONCE INJ  Last administered 

on 8/19/19at 10:54;  Start 8/19/19 at 10:45;  Stop 8/19/19 at 10:46;  Status DC


Info (CONTRAST GIVEN -- Rx MONITORING) 1 each PRN DAILY  PRN MC SEE COMMENTS;  

Start 8/19/19 at 11:00;  Stop 8/21/19 at 10:59;  Status DC


Sodium Chloride 1,000 ml @  100 mls/hr Q10H IV  Last administered on 8/22/19at 

08:59;  Start 8/19/19 at 10:49


Nitroglycerin (Nitrostat) 0.4 mg PRN Q5MIN  PRN SL CHEST PAIN;  Start 8/19/19 at

11:00


Acetaminophen (Tylenol) 325 mg DAILY PO  Last administered on 8/22/19at 08:59;  

Start 8/20/19 at 09:00


Amlodipine Besylate (Norvasc) 5 mg DAILY PO  Last administered on 8/22/19at 

08:59;  Start 8/20/19 at 09:00


Aspirin (Ecotrin) 325 mg DAILY PO  Last administered on 8/21/19at 08:19;  Start 

8/20/19 at 09:00;  Stop 8/21/19 at 09:00;  Status DC


Insulin Glargine (Lantus) 17 units QHS SQ  Last administered on 8/21/19at 21:20;

 Start 8/19/19 at 21:00


Lisinopril (Prinivil) 10 mg BID PO  Last administered on 8/22/19at 08:59;  Start

8/19/19 at 21:00


Calcium/Vitamin D (Oscal D 500mg/ 200uts) 1 tab DAILY PO  Last administered on 

8/22/19at 08:59;  Start 8/20/19 at 09:00


Insulin Human Lispro (HumaLOG) 5 units DAILYWBKFT SQ  Last administered on 

8/22/19at 09:10;  Start 8/19/19 at 17:00


Insulin Human Lispro (HumaLOG) 10 units DAILYWSUP SQ  Last administered on 

8/21/19at 18:04;  Start 8/19/19 at 17:00


Multivitamins (Thera M Plus) 1 tab DAILY PO  Last administered on 8/22/19at 

08:59;  Start 8/20/19 at 09:00


Non-Formulary Medication (Ubidecarenone (Coq-10)) 100 mg DAILY PO ;  Start 

8/20/19 at 09:00;  Status UNV


Vitamin E 200 unit DAILY PO  Last administered on 8/22/19at 08:59;  Start 

8/20/19 at 09:00


Morphine Sulfate (Morphine Sulfate) 2 mg PRN Q4HRS  PRN IV PAIN;  Start 8/19/19 

at 20:15


Tramadol HCl (Ultram) 50 mg PRN Q6HRS  PRN PO PAIN Last administered on 

8/22/19at 08:59;  Start 8/19/19 at 20:15


Insulin Human Lispro (HumaLOG) 0-5 UNITS TIDWMEALS SQ  Last administered on 

8/21/19at 18:04;  Start 8/20/19 at 12:00


Dextrose (Dextrose 50%-Water Syringe) 12.5 gm PRN Q15MIN  PRN IV SEE COMMENTS;  

Start 8/20/19 at 12:15


Dextrose 250 ml PRN Q15MIN  PRN IV SEE COMMENTS;  Start 8/20/19 at 12:15;  Stop 

8/20/19 at 12:11;  Status DC


Acetaminophen (Tylenol) 650 mg PRN Q6HRS  PRN PO TEMP > 100.4F;  Start 8/20/19 

at 14:45


Acetaminophen (Tylenol Supp) 650 mg PRN Q4HRS  PRN DE TEMP > 100.4F;  Start 

8/20/19 at 14:45


Aspirin (Ecotrin) 325 mg DAILYWBKFT PO ;  Start 8/21/19 at 08:00;  Status Cancel


Aspirin (Aspirin Rectal Supp) 300 mg PRN DAILY  PRN DE IF UNABLE TO TAKE PO;  

Start 8/20/19 at 14:45;  Stop 8/21/19 at 09:00;  Status DC


Atorvastatin Calcium (Lipitor) 20 mg QHS PO  Last administered on 8/21/19at 

21:20;  Start 8/21/19 at 21:00


Aspirin (Ecotrin) 81 mg DAILY PO ;  Start 8/21/19 at 09:00;  Stop 8/21/19 at 

12:51;  Status DC


Clopidogrel Bisulfate (Plavix) 75 mg DAILYWBKFT PO  Last administered on 

8/22/19at 08:59;  Start 8/21/19 at 09:00


Benzocaine (Hurricaine One) 3 spray 1X  ONCE MM  Last administered on 8/21/19at 

15:45;  Start 8/21/19 at 10:15;  Stop 8/21/19 at 10:19;  Status DC


Lidocaine HCl (Xylocaine 2% Topical 30gm Tube) 1 aroldo 1X  ONCE TP  Last 

administered on 8/21/19at 15:45;  Start 8/21/19 at 10:15;  Stop 8/21/19 at 

10:19;  Status DC


Lidocaine HCl (Viscous Lidocaine) 15 ml 1X  ONCE SWSW ;  Start 8/21/19 at 10:15;

 Stop 8/21/19 at 10:19;  Status DC


Ondansetron HCl (Zofran) 4 mg PRN Q6HRS  PRN IV NAUSEA/VOMITING;  Start 8/21/19 

at 10:45;  Stop 8/22/19 at 10:44


Fentanyl Citrate (Fentanyl 2ml Vial) 25 mcg PRN Q5MIN  PRN IV MILD PAIN 1-3;  

Start 8/21/19 at 10:45;  Stop 8/22/19 at 10:44


Fentanyl Citrate (Fentanyl 2ml Vial) 50 mcg PRN Q5MIN  PRN IV MODERATE TO SEVERE

PAIN;  Start 8/21/19 at 10:45;  Stop 8/22/19 at 10:44


Morphine Sulfate (Morphine Sulfate) 1 mg PRN Q10MIN  PRN IV SEVERE PAIN 7-10;  

Start 8/21/19 at 10:45;  Stop 8/22/19 at 10:44


Ringer's Solution 1,000 ml @  30 mls/hr Q24H IV  Last administered on 8/21/19at 

15:47;  Start 8/21/19 at 10:41;  Stop 8/21/19 at 22:40;  Status DC


Lidocaine HCl (Xylocaine-Mpf 1% 2ml Vial) 2 ml PRN 1X  PRN ID PRIOR TO IV START;

 Start 8/21/19 at 10:45;  Stop 8/22/19 at 10:44


Hydromorphone HCl (Dilaudid) 0.5 mg PRN Q10MIN  PRN IV SEV PAIN, Second choice; 

Start 8/21/19 at 10:45;  Stop 8/22/19 at 10:44


Prochlorperazine Edisylate (Compazine) 5 mg PACU PRN  PRN IV NAUSEA, MRX1;  

Start 8/21/19 at 10:45;  Stop 8/22/19 at 10:44


Aspirin (Ecotrin) 81 mg DAILYWBKFT PO  Last administered on 8/22/19at 08:59;  

Start 8/22/19 at 08:00


Lidocaine HCl (Viscous Lidocaine) 15 ml STK-MED ONCE .ROUTE ;  Start 8/21/19 at 

14:53;  Stop 8/21/19 at 14:53;  Status DC


Benzocaine (Hurricaine One) 1 spray STK-MED ONCE .ROUTE ;  Start 8/21/19 at 

14:53;  Stop 8/21/19 at 14:54;  Status DC


Lidocaine HCl (Xylocaine 2% Topical 30gm Tube) 30 aroldo STK-MED ONCE TP ;  Start 

8/21/19 at 14:54;  Stop 8/21/19 at 14:55;  Status DC


Propofol 20 ml @ As Directed STK-MED ONCE IV ;  Start 8/21/19 at 15:12;  Stop 

8/21/19 at 15:13;  Status DC


Propofol 20 ml @ As Directed STK-MED ONCE IV ;  Start 8/21/19 at 15:12;  Stop 

8/21/19 at 15:13;  Status DC


Lidocaine HCl (Lidocaine Pf 2% Vial) 5 ml STK-MED ONCE .ROUTE ;  Start 8/21/19 

at 15:12;  Stop 8/21/19 at 15:13;  Status DC





Active Scripts


Active


Reported


Novolog Flexpen (Insulin Aspart) 100 Unit/1 Ml Insuln.pen 10 Unit SQ DAILYWSUP


Novolog Flexpen (Insulin Aspart) 100 Unit/1 Ml Insuln.pen 5 Unit SQ DAILYWBKFT


Vitamin E (Vitamin E Acid Succinate) 100 Unit Tablet 100 Unit PO DAILY


Coq-10 (Ubidecarenone) 100 Mg Capsule 100 Mg PO DAILY


Lantus Solostar (Insulin Glargine,Hum.rec.anlog) 100 Unit/1 Ml Insuln.pen 17 

Unit SQ QHS


Caltrate 600 + D Soft Chew Tab (Calcium Carbonate/Vitamin D3) 1 Each Tab.chew 1 

Each PO DAILY


One Daily For Women 50+ Adv Tb (Multivitamins-Min/Fa/Ginkgo) 1 Each Tablet 1 

Each PO DAILY


Tylenol (Acetaminophen) 325 Mg Tablet 325 Mg PO DAILY


Aspirin Ec (Aspirin) 325 Mg Tablet.dr 325 Mg PO DAILY


Amlodipine Besylate 5 Mg Tablet 5 Mg PO DAILY


Zestril (Lisinopril) 10 Mg Tablet 10 Mg PO BID


Metformin Hcl 1,000 Mg Tablet 1,000 Mg PO BIDWMEALS


Vitals/I & O





Vital Sign - Last 24 Hours








 8/21/19 8/21/19 8/21/19 8/21/19





 10:52 15:00 15:09 15:53


 


Temp 98.0 98.0  97.6





 98.0 98.0  97.6


 


Pulse 71 68 68 60


 


Resp 16 15 20 18


 


B/P (MAP) 167/74 (105) 160/67 (98) 173/67 132/52


 


Pulse Ox 95  100 99


 


O2 Delivery Room Air Room Air Room Air Simple Mask





   Nasal Cannula 


 


O2 Flow Rate   2 12


 


    





    





 8/21/19 8/21/19 8/21/19 8/21/19





 15:53 16:00 16:15 16:31


 


Temp  97.6 97.6 98.5





  97.6 97.6 98.5


 


Pulse  60 68 16


 


Resp  18 18 16


 


B/P (MAP)  118/44 156/59 150/69 (96)


 


Pulse Ox  99 98 98


 


O2 Delivery Mask Nasal Cannula Nasal Cannula Nasal Cannula


 


O2 Flow Rate 12 3 3 2.0


 


    





    





 8/21/19 8/21/19 8/21/19 8/21/19





 19:50 20:20 21:20 21:20


 


Temp 98.1   





 98.1   


 


Pulse 73  73 


 


Resp 19   


 


B/P (MAP) 157/70 (99)  157/70 


 


Pulse Ox 97   


 


O2 Delivery Room Air Room Air  Nasal Cannula


 


    





    





 8/21/19 8/21/19 8/22/19 8/22/19





 23:45 23:50 03:33 07:00


 


Temp 98.3  98.1 97.8





 98.3  98.1 97.8


 


Pulse 70  81 66


 


Resp 18  19 18


 


B/P (MAP) 152/66 (94)  152/67 (95) 150/65 (93)


 


Pulse Ox 96  96 97


 


O2 Delivery Room Air Room Air Room Air Room Air


 


    





    





 8/22/19 8/22/19 8/22/19 





 08:59 08:59 08:59 


 


Pulse 77 77  


 


B/P (MAP) 150/65 150/65  


 


Pulse Ox   97 


 


O2 Delivery   Room Air 














Intake and Output   


 


 8/21/19 8/21/19 8/22/19





 14:59 22:59 06:59


 


Intake Total  160 ml 300 ml


 


Output Total  200 ml 250 ml


 


Balance  -40 ml 50 ml

















SOFIA PAUL MD          Aug 22, 2019 10:23

## 2019-08-22 NOTE — NUR
Discharge Note:



[facility]



Discharge instructions and discharge home medications reviewed with Other facility and a 
copy given. All questions have been answered and understanding verbalized. 



The following instructions and handouts were given: Patient package given to facility 
transport. 



Discontinued iv lines and catheter intact.



Patient discharged to [facility] with with assist care.

## 2019-08-22 NOTE — PDOC
PROGRESS NOTES


Assessment


Problems


Medical Problems:


(1) Diabetes


Status: Chronic  





(2) Dyspnea


Status: Acute  





(3) HTN (hypertension)


Status: Chronic  





(4) Hypertensive encephalopathy


Status: Acute  





(5) Metabolic encephalopathy


Status: Acute  





(6) NSTEMI (non-ST elevated myocardial infarction)


Status: Acute  





(7) Valvular stenosis, aortic


Status: Chronic  





Multiple embolic strokes:  anterior left cerebellum, posterior medial left 

cerebellum, right occipital lobe, left temporal lobe, posterior right centrum 

semiovale, right parietal cortical surface, right frontal lobe, right frontal 

cortical surface.


Vasovagal syncope, component of hypertensive encephalopathy, possible concussion


No evidence of seizure,  EEG is negative


Family stress


Note hyperlipidemia, is on a statin


Plan


Event monitor, possible NORBERT


Add clopidogrel, discontinue aspirin after a week


Discharge to [facility] given gait disorder


Follow up with neurology PRN


Follow up with PCP re cholesterol


Discussed with patient son, especially stroke precautions.


Subjective


Feels back to herself


Objective





Vital Signs








  Date Time  Temp Pulse Resp B/P (MAP) Pulse Ox O2 Delivery O2 Flow Rate FiO2


 


8/22/19 15:00 98.0 75 18 161/59 (93) 97 Room Air  





 98.0       


 


8/21/19 16:31       2.0 














Intake and Output 


 


 8/22/19





 07:00


 


Intake Total 460 ml


 


Output Total 450 ml


 


Balance 10 ml


 


 


 


Intake Oral 360 ml


 


IV Total 100 ml


 


Output Urine Total 450 ml








PHYSICAL EXAM


Alert. Oriented to time, place and person.


PERRL.


EOMI.


CN: no focal findings.


Muscle tone: normal.


Muscle strength: 5/5


DTR:  2+


Plantar reflex:  flexor


Gait: not examined in bed.


Sensory exam: no abnormal findings.


No cerebellar signs elicited.


Review of Relevant


I have reviewed the following items lorenzo (where applicable) has been applied.


Labs





Laboratory Tests








Test


 8/20/19


16:35 8/20/19


17:17 8/20/19


21:05 8/21/19


04:30


 


Urine Random Sodium


 <60 mmol/L


(Not Estab.) 


 


 





 


Glucose (Fingerstick)


 


 178 mg/dL


(70-99) 148 mg/dL


(70-99) 





 


White Blood Count


 


 


 


 8.6 x10^3/uL


(4.0-11.0)


 


Red Blood Count


 


 


 


 3.69 x10^6/uL


(3.50-5.40)


 


Hemoglobin


 


 


 


 10.6 g/dL


(12.0-15.5)


 


Hematocrit


 


 


 


 30.9 %


(36.0-47.0)


 


Mean Corpuscular Volume    84 fL () 


 


Mean Corpuscular Hemoglobin    29 pg (25-35) 


 


Mean Corpuscular Hemoglobin


Concent 


 


 


 34 g/dL


(31-37)


 


Red Cell Distribution Width


 


 


 


 13.2 %


(11.5-14.5)


 


Platelet Count


 


 


 


 292 x10^3/uL


(140-400)


 


Neutrophils (%) (Auto)    68 % (31-73) 


 


Lymphocytes (%) (Auto)    20 % (24-48) 


 


Monocytes (%) (Auto)    10 % (0-9) 


 


Eosinophils (%) (Auto)    2 % (0-3) 


 


Basophils (%) (Auto)    0 % (0-3) 


 


Neutrophils # (Auto)


 


 


 


 5.8 x10^3/uL


(1.8-7.7)


 


Lymphocytes # (Auto)


 


 


 


 1.7 x10^3/uL


(1.0-4.8)


 


Monocytes # (Auto)


 


 


 


 0.8 x10^3/uL


(0.0-1.1)


 


Eosinophils # (Auto)


 


 


 


 0.2 x10^3/uL


(0.0-0.7)


 


Basophils # (Auto)


 


 


 


 0.0 x10^3/uL


(0.0-0.2)


 


Sodium Level


 


 


 


 130 mmol/L


(136-145)


 


Potassium Level


 


 


 


 3.9 mmol/L


(3.5-5.1)


 


Chloride Level


 


 


 


 96 mmol/L


()


 


Carbon Dioxide Level


 


 


 


 25 mmol/L


(21-32)


 


Anion Gap    9 (6-14) 


 


Blood Urea Nitrogen


 


 


 


 19 mg/dL


(7-20)


 


Creatinine


 


 


 


 1.0 mg/dL


(0.6-1.0)


 


Estimated GFR


(Cockcroft-Gault) 


 


 


 52.3 





 


BUN/Creatinine Ratio    19 (6-20) 


 


Glucose Level


 


 


 


 119 mg/dL


(70-99)


 


Calcium Level


 


 


 


 8.7 mg/dL


(8.5-10.1)


 


Total Bilirubin


 


 


 


 0.5 mg/dL


(0.2-1.0)


 


Aspartate Amino Transf


(AST/SGOT) 


 


 


 20 U/L (15-37) 





 


Alanine Aminotransferase


(ALT/SGPT) 


 


 


 21 U/L (14-59) 





 


Alkaline Phosphatase


 


 


 


 65 U/L


()


 


Total Protein


 


 


 


 6.6 g/dL


(6.4-8.2)


 


Albumin


 


 


 


 3.0 g/dL


(3.4-5.0)


 


Albumin/Globulin Ratio    0.8 (1.0-1.7) 


 


Triglycerides Level


 


 


 


 134 mg/dL


(0-150)


 


Cholesterol Level


 


 


 


 183 mg/dL


(0-200)


 


LDL Cholesterol, Calculated


 


 


 


 107 mg/dL


(0-100)


 


VLDL Cholesterol, Calculated


 


 


 


 27 mg/dL


(0-40)


 


Non-HDL Cholesterol Calculated


 


 


 


 134 mg/dL


(0-129)


 


HDL Cholesterol


 


 


 


 49 mg/dL


(40-60)


 


Cholesterol/HDL Ratio    3.7 


 


Test


 8/21/19


07:38 8/21/19


12:41 8/21/19


17:36 8/21/19


20:47


 


Glucose (Fingerstick)


 113 mg/dL


(70-99) 162 mg/dL


(70-99) 160 mg/dL


(70-99) 144 mg/dL


(70-99)


 


Test


 8/22/19


03:40 8/22/19


03:45 8/22/19


07:22 8/22/19


11:46


 


White Blood Count


 7.8 x10^3/uL


(4.0-11.0) 


 


 





 


Red Blood Count


 3.65 x10^6/uL


(3.50-5.40) 


 


 





 


Hemoglobin


 10.6 g/dL


(12.0-15.5) 


 


 





 


Hematocrit


 30.7 %


(36.0-47.0) 


 


 





 


Mean Corpuscular Volume 84 fL ()    


 


Mean Corpuscular Hemoglobin 29 pg (25-35)    


 


Mean Corpuscular Hemoglobin


Concent 35 g/dL


(31-37) 


 


 





 


Red Cell Distribution Width


 13.3 %


(11.5-14.5) 


 


 





 


Platelet Count


 292 x10^3/uL


(140-400) 


 


 





 


Neutrophils (%) (Auto) 75 % (31-73)    


 


Lymphocytes (%) (Auto) 14 % (24-48)    


 


Monocytes (%) (Auto) 9 % (0-9)    


 


Eosinophils (%) (Auto) 2 % (0-3)    


 


Basophils (%) (Auto) 0 % (0-3)    


 


Neutrophils # (Auto)


 5.8 x10^3/uL


(1.8-7.7) 


 


 





 


Lymphocytes # (Auto)


 1.1 x10^3/uL


(1.0-4.8) 


 


 





 


Monocytes # (Auto)


 0.7 x10^3/uL


(0.0-1.1) 


 


 





 


Eosinophils # (Auto)


 0.1 x10^3/uL


(0.0-0.7) 


 


 





 


Basophils # (Auto)


 0.0 x10^3/uL


(0.0-0.2) 


 


 





 


Sodium Level


 


 134 mmol/L


(136-145) 


 





 


Potassium Level


 


 4.1 mmol/L


(3.5-5.1) 


 





 


Chloride Level


 


 100 mmol/L


() 


 





 


Carbon Dioxide Level


 


 25 mmol/L


(21-32) 


 





 


Anion Gap  9 (6-14)   


 


Blood Urea Nitrogen


 


 19 mg/dL


(7-20) 


 





 


Creatinine


 


 1.0 mg/dL


(0.6-1.0) 


 





 


Estimated GFR


(Cockcroft-Gault) 


 52.3 


 


 





 


BUN/Creatinine Ratio  19 (6-20)   


 


Glucose Level


 


 118 mg/dL


(70-99) 


 





 


Calcium Level


 


 8.6 mg/dL


(8.5-10.1) 


 





 


Total Bilirubin


 


 0.5 mg/dL


(0.2-1.0) 


 





 


Aspartate Amino Transf


(AST/SGOT) 


 21 U/L (15-37) 


 


 





 


Alanine Aminotransferase


(ALT/SGPT) 


 20 U/L (14-59) 


 


 





 


Alkaline Phosphatase


 


 64 U/L


() 


 





 


Total Protein


 


 6.6 g/dL


(6.4-8.2) 


 





 


Albumin


 


 3.0 g/dL


(3.4-5.0) 


 





 


Albumin/Globulin Ratio  0.8 (1.0-1.7)   


 


Glucose (Fingerstick)


 


 


 121 mg/dL


(70-99) 212 mg/dL


(70-99)








Laboratory Tests








Test


 8/21/19


17:36 8/21/19


20:47 8/22/19


03:40 8/22/19


03:45


 


Glucose (Fingerstick)


 160 mg/dL


(70-99) 144 mg/dL


(70-99) 


 





 


White Blood Count


 


 


 7.8 x10^3/uL


(4.0-11.0) 





 


Red Blood Count


 


 


 3.65 x10^6/uL


(3.50-5.40) 





 


Hemoglobin


 


 


 10.6 g/dL


(12.0-15.5) 





 


Hematocrit


 


 


 30.7 %


(36.0-47.0) 





 


Mean Corpuscular Volume   84 fL ()  


 


Mean Corpuscular Hemoglobin   29 pg (25-35)  


 


Mean Corpuscular Hemoglobin


Concent 


 


 35 g/dL


(31-37) 





 


Red Cell Distribution Width


 


 


 13.3 %


(11.5-14.5) 





 


Platelet Count


 


 


 292 x10^3/uL


(140-400) 





 


Neutrophils (%) (Auto)   75 % (31-73)  


 


Lymphocytes (%) (Auto)   14 % (24-48)  


 


Monocytes (%) (Auto)   9 % (0-9)  


 


Eosinophils (%) (Auto)   2 % (0-3)  


 


Basophils (%) (Auto)   0 % (0-3)  


 


Neutrophils # (Auto)


 


 


 5.8 x10^3/uL


(1.8-7.7) 





 


Lymphocytes # (Auto)


 


 


 1.1 x10^3/uL


(1.0-4.8) 





 


Monocytes # (Auto)


 


 


 0.7 x10^3/uL


(0.0-1.1) 





 


Eosinophils # (Auto)


 


 


 0.1 x10^3/uL


(0.0-0.7) 





 


Basophils # (Auto)


 


 


 0.0 x10^3/uL


(0.0-0.2) 





 


Sodium Level


 


 


 


 134 mmol/L


(136-145)


 


Potassium Level


 


 


 


 4.1 mmol/L


(3.5-5.1)


 


Chloride Level


 


 


 


 100 mmol/L


()


 


Carbon Dioxide Level


 


 


 


 25 mmol/L


(21-32)


 


Anion Gap    9 (6-14) 


 


Blood Urea Nitrogen


 


 


 


 19 mg/dL


(7-20)


 


Creatinine


 


 


 


 1.0 mg/dL


(0.6-1.0)


 


Estimated GFR


(Cockcroft-Gault) 


 


 


 52.3 





 


BUN/Creatinine Ratio    19 (6-20) 


 


Glucose Level


 


 


 


 118 mg/dL


(70-99)


 


Calcium Level


 


 


 


 8.6 mg/dL


(8.5-10.1)


 


Total Bilirubin


 


 


 


 0.5 mg/dL


(0.2-1.0)


 


Aspartate Amino Transf


(AST/SGOT) 


 


 


 21 U/L (15-37) 





 


Alanine Aminotransferase


(ALT/SGPT) 


 


 


 20 U/L (14-59) 





 


Alkaline Phosphatase


 


 


 


 64 U/L


()


 


Total Protein


 


 


 


 6.6 g/dL


(6.4-8.2)


 


Albumin


 


 


 


 3.0 g/dL


(3.4-5.0)


 


Albumin/Globulin Ratio    0.8 (1.0-1.7) 


 


Test


 8/22/19


07:22 8/22/19


11:46 


 





 


Glucose (Fingerstick)


 121 mg/dL


(70-99) 212 mg/dL


(70-99) 


 











Medications





Current Medications


Aspirin (Ecotrin) 324 mg 1X  ONCE PO  Last administered on 8/19/19at 03:10;  

Start 8/19/19 at 03:30;  Stop 8/19/19 at 03:31;  Status DC


Ondansetron HCl (Zofran) 4 mg PRN Q8HRS  PRN IV NAUSEA/VOMITING 1ST CHOICE;  

Start 8/19/19 at 04:30;  Stop 8/20/19 at 04:29;  Status DC


Sodium Chloride 1,000 ml @  75 mls/hr [id number] IV  Last administered on 8/19/19at

04:41;  Start 8/19/19 at 05:00;  Stop 8/20/19 at 04:59;  Status DC


Lidocaine HCl (Xylocaine-Mpf 1% 2ml Vial) 2 ml STK-MED ONCE .ROUTE ;  Start 

8/19/19 at 09:30;  Stop 8/19/19 at 09:31;  Status DC


Iohexol (Omnipaque 300 Mg/ml) 100 ml STK-MED ONCE .ROUTE ;  Start 8/19/19 at 

09:30;  Stop 8/19/19 at 09:31;  Status DC


Heparin Sodium/ Sodium Chloride 500 ml @  As Directed STK-MED ONCE .ROUTE ;  

Start 8/19/19 at 09:30;  Stop 8/19/19 at 09:31;  Status DC


Fentanyl Citrate (Fentanyl 2ml Vial) 100 mcg STK-MED ONCE .ROUTE ;  Start 

8/19/19 at 09:48;  Stop 8/19/19 at 09:48;  Status DC


Midazolam HCl (Versed) 2 mg STK-MED ONCE .ROUTE ;  Start 8/19/19 at 09:48;  Stop

8/19/19 at 09:48;  Status DC


Heparin Sodium (Porcine) (Heparin Sodium) 10,000 unit STK-MED ONCE .ROUTE ;  

Start 8/19/19 at 09:48;  Stop 8/19/19 at 09:48;  Status DC


Verapamil HCl (Verapamil) 5 mg STK-MED ONCE .ROUTE ;  Start 8/19/19 at 09:48;  

Stop 8/19/19 at 09:48;  Status DC


Nitroglycerin (Nitroglycerin) 200 mcg STK-MED ONCE .ROUTE ;  Start 8/19/19 at 

09:48;  Stop 8/19/19 at 09:49;  Status DC


Iohexol (Omnipaque 300 Mg/ml) 100 ml STK-MED ONCE .ROUTE ;  Start 8/19/19 at 

10:24;  Stop 8/19/19 at 10:24;  Status DC


Nitroglycerin (Nitroglycerin) 200 mcg 1X  ONCE IART  Last administered on 

8/19/19at 10:54;  Start 8/19/19 at 10:45;  Stop 8/19/19 at 10:46;  Status DC


Verapamil HCl (Verapamil) 2.5 mg 1X  ONCE IART  Last administered on 8/19/19at 

10:54;  Start 8/19/19 at 10:45;  Stop 8/19/19 at 10:46;  Status DC


Heparin Sodium (Porcine) (Heparin Sodium) 2,500 unit 1X  ONCE IART  Last 

administered on 8/19/19at 10:54;  Start 8/19/19 at 10:45;  Stop 8/19/19 at 

10:46;  Status DC


Heparin Sodium/ Sodium Chloride (HEPARIN for ARTERIAL LINE FLUSH) 1,000 unit 1X 

ONCE IART  Last administered on 8/19/19at 10:54;  Start 8/19/19 at 10:45;  Stop 

8/19/19 at 10:46;  Status DC


Midazolam HCl (Versed) 2 mg 1X  ONCE IV  Last administered on 8/19/19at 10:54;  

Start 8/19/19 at 10:45;  Stop 8/19/19 at 10:46;  Status DC


Fentanyl Citrate (Fentanyl 2ml Vial) 100 mcg 1X  ONCE IV  Last administered on 

8/19/19at 10:54;  Start 8/19/19 at 10:45;  Stop 8/19/19 at 10:46;  Status DC


Iohexol (Omnipaque 300 Mg/ml) 100 ml 1X  ONCE IART  Last administered on 

8/19/19at 10:54;  Start 8/19/19 at 10:45;  Stop 8/19/19 at 10:46;  Status DC


Lidocaine HCl (Xylocaine-Mpf 1% 2ml Vial) 2 ml 1X  ONCE INJ  Last administered 

on 8/19/19at 10:54;  Start 8/19/19 at 10:45;  Stop 8/19/19 at 10:46;  Status DC


Info (CONTRAST GIVEN -- Rx MONITORING) 1 each PRN DAILY  PRN MC SEE COMMENTS;  

Start 8/19/19 at 11:00;  Stop 8/21/19 at 10:59;  Status DC


Sodium Chloride 1,000 ml @  100 mls/hr Q10H IV  Last administered on 8/22/19at 

08:59;  Start 8/19/19 at 10:49;  Stop 8/22/19 at 10:59;  Status DC


Nitroglycerin (Nitrostat) 0.4 mg PRN Q5MIN  PRN SL CHEST PAIN;  Start 8/19/19 at

11:00


Acetaminophen (Tylenol) 325 mg DAILY PO  Last administered on 8/22/19at 08:59;  

Start 8/20/19 at 09:00


Amlodipine Besylate (Norvasc) 5 mg DAILY PO  Last administered on 8/22/19at 

08:59;  Start 8/20/19 at 09:00


Aspirin (Ecotrin) 325 mg DAILY PO  Last administered on 8/21/19at 08:19;  Start 

8/20/19 at 09:00;  Stop 8/21/19 at 09:00;  Status DC


Insulin Glargine (Lantus) 17 units QHS SQ  Last administered on 8/21/19at 21:20;

 Start 8/19/19 at 21:00


Lisinopril (Prinivil) 10 mg BID PO  Last administered on 8/22/19at 08:59;  Start

8/19/19 at 21:00


Calcium/Vitamin D (Oscal D 500mg/ 200uts) 1 tab DAILY PO  Last administered on 

8/22/19at 08:59;  Start 8/20/19 at 09:00


Insulin Human Lispro (HumaLOG) 5 units DAILYWBKFT SQ  Last administered on 

8/22/19at 09:10;  Start 8/19/19 at 17:00


Insulin Human Lispro (HumaLOG) 10 units DAILYWSUP SQ  Last administered on 

8/21/19at 18:04;  Start 8/19/19 at 17:00


Multivitamins (Thera M Plus) 1 tab DAILY PO  Last administered on 8/22/19at 08

:59;  Start 8/20/19 at 09:00


Non-Formulary Medication (Ubidecarenone (Coq-10)) 100 mg DAILY PO ;  Start 

8/20/19 at 09:00;  Status UNV


Vitamin E 200 unit DAILY PO  Last administered on 8/22/19at 08:59;  Start 

8/20/19 at 09:00


Morphine Sulfate (Morphine Sulfate) 2 mg PRN Q4HRS  PRN IV PAIN;  Start 8/19/19 

at 20:15


Tramadol HCl (Ultram) 50 mg PRN Q6HRS  PRN PO PAIN Last administered on 

8/22/19at 08:59;  Start 8/19/19 at 20:15


Insulin Human Lispro (HumaLOG) 0-5 UNITS TIDWMEALS SQ  Last administered on 

8/22/19at 12:37;  Start 8/20/19 at 12:00


Dextrose (Dextrose 50%-Water Syringe) 12.5 gm PRN Q15MIN  PRN IV SEE COMMENTS;  

Start 8/20/19 at 12:15


Dextrose 250 ml PRN Q15MIN  PRN IV SEE COMMENTS;  Start 8/20/19 at 12:15;  Stop 

8/20/19 at 12:11;  Status DC


Acetaminophen (Tylenol) 650 mg PRN Q6HRS  PRN PO TEMP > 100.4F;  Start 8/20/19 

at 14:45


Acetaminophen (Tylenol Supp) 650 mg PRN Q4HRS  PRN OK TEMP > 100.4F;  Start 

8/20/19 at 14:45


Aspirin (Ecotrin) 325 mg DAILYWBKFT PO ;  Start 8/21/19 at 08:00;  Status Cancel


Aspirin (Aspirin Rectal Supp) 300 mg PRN DAILY  PRN OK IF UNABLE TO TAKE PO;  

Start 8/20/19 at 14:45;  Stop 8/21/19 at 09:00;  Status DC


Atorvastatin Calcium (Lipitor) 20 mg QHS PO  Last administered on 8/21/19at 

21:20;  Start 8/21/19 at 21:00


Aspirin (Ecotrin) 81 mg DAILY PO ;  Start 8/21/19 at 09:00;  Stop 8/21/19 at 

12:51;  Status DC


Clopidogrel Bisulfate (Plavix) 75 mg DAILYWBKFT PO  Last administered on 

8/22/19at 08:59;  Start 8/21/19 at 09:00


Benzocaine (Hurricaine One) 3 spray 1X  ONCE MM  Last administered on 8/21/19at 

15:45;  Start 8/21/19 at 10:15;  Stop 8/21/19 at 10:19;  Status DC


Lidocaine HCl (Xylocaine 2% Topical 30gm Tube) 1 aroldo 1X  ONCE TP  Last 

administered on 8/21/19at 15:45;  Start 8/21/19 at 10:15;  Stop 8/21/19 at 

10:19;  Status DC


Lidocaine HCl (Viscous Lidocaine) 15 ml 1X  ONCE SWSW ;  Start 8/21/19 at 10:15;

 Stop 8/21/19 at 10:19;  Status DC


Ondansetron HCl (Zofran) 4 mg PRN Q6HRS  PRN IV NAUSEA/VOMITING;  Start 8/21/19 

at 10:45;  Stop 8/22/19 at 10:44;  Status DC


Fentanyl Citrate (Fentanyl 2ml Vial) 25 mcg PRN Q5MIN  PRN IV MILD PAIN 1-3;  

Start 8/21/19 at 10:45;  Stop 8/22/19 at 10:44;  Status DC


Fentanyl Citrate (Fentanyl 2ml Vial) 50 mcg PRN Q5MIN  PRN IV MODERATE TO SEVERE

PAIN;  Start 8/21/19 at 10:45;  Stop 8/22/19 at 10:44;  Status DC


Morphine Sulfate (Morphine Sulfate) 1 mg PRN Q10MIN  PRN IV SEVERE PAIN 7-10;  

Start 8/21/19 at 10:45;  Stop 8/22/19 at 10:44;  Status DC


Ringer's Solution 1,000 ml @  30 mls/hr Q24H IV  Last administered on 8/21/19at 

15:47;  Start 8/21/19 at 10:41;  Stop 8/21/19 at 22:40;  Status DC


Lidocaine HCl (Xylocaine-Mpf 1% 2ml Vial) 2 ml PRN 1X  PRN ID PRIOR TO IV START;

 Start 8/21/19 at 10:45;  Stop 8/22/19 at 10:44;  Status DC


Hydromorphone HCl (Dilaudid) 0.5 mg PRN Q10MIN  PRN IV SEV PAIN, Second choice; 

Start 8/21/19 at 10:45;  Stop 8/22/19 at 10:44;  Status DC


Prochlorperazine Edisylate (Compazine) 5 mg PACU PRN  PRN IV NAUSEA, MRX1;  

Start 8/21/19 at 10:45;  Stop 8/22/19 at 10:44;  Status DC


Aspirin (Ecotrin) 81 mg DAILYWBKFT PO  Last administered on 8/22/19at 08:59;  

Start 8/22/19 at 08:00


Lidocaine HCl (Viscous Lidocaine) 15 ml STK-MED ONCE .ROUTE ;  Start 8/21/19 at 

14:53;  Stop 8/21/19 at 14:53;  Status DC


Benzocaine (Hurricaine One) 1 spray STK-MED ONCE .ROUTE ;  Start 8/21/19 at 

14:53;  Stop 8/21/19 at 14:54;  Status DC


Lidocaine HCl (Xylocaine 2% Topical 30gm Tube) 30 aroldo STK-MED ONCE TP ;  Start 

8/21/19 at 14:54;  Stop 8/21/19 at 14:55;  Status DC


Propofol 20 ml @ As Directed STK-MED ONCE IV ;  Start 8/21/19 at 15:12;  Stop 

8/21/19 at 15:13;  Status DC


Propofol 20 ml @ As Directed STK-MED ONCE IV ;  Start 8/21/19 at 15:12;  Stop 

8/21/19 at 15:13;  Status DC


Lidocaine HCl (Lidocaine Pf 2% Vial) 5 ml STK-MED ONCE .ROUTE ;  Start 8/21/19 

at 15:12;  Stop 8/21/19 at 15:13;  Status DC





Active Scripts


Active


Aspirin Ec (Aspirin) 81 Mg Tablet.dr 81 Mg PO DAILYWBKFT 30 Days


Atorvastatin Calcium 20 Mg Tablet 20 Mg PO QHS 30 Days


Clopidogrel (Clopidogrel Bisulfate) 75 Mg Tablet 75 Mg PO DAILYWBKFT 30 Days


Reported


Novolog Flexpen (Insulin Aspart) 100 Unit/1 Ml Insuln.pen 10 Unit SQ DAILYWSUP


Novolog Flexpen (Insulin Aspart) 100 Unit/1 Ml Insuln.pen 5 Unit SQ DAILYWBKFT


Vitamin E (Vitamin E Acid Succinate) 100 Unit Tablet 100 Unit PO DAILY


Coq-10 (Ubidecarenone) 100 Mg Capsule 100 Mg PO DAILY


Lantus Solostar (Insulin Glargine,Hum.rec.anlog) 100 Unit/1 Ml Insuln.pen 17 

Unit SQ QHS


Caltrate 600 + D Soft Chew Tab (Calcium Carbonate/Vitamin D3) 1 Each Tab.chew 1 

Each PO DAILY


One Daily For Women 50+ Adv Tb (Multivitamins-Min/Fa/Ginkgo) 1 Each Tablet 1 

Each PO DAILY


Tylenol (Acetaminophen) 325 Mg Tablet 325 Mg PO DAILY


Amlodipine Besylate 5 Mg Tablet 5 Mg PO DAILY


Zestril (Lisinopril) 10 Mg Tablet 10 Mg PO BID


Vitals/I & O





Vital Sign - Last 24 Hours








 8/21/19 8/21/19 8/21/19 8/21/19





 15:53 15:53 16:00 16:15


 


Temp 97.6  97.6 97.6





 97.6  97.6 97.6


 


Pulse 60  60 68


 


Resp 18  18 18


 


B/P (MAP) 132/52  118/44 156/59


 


Pulse Ox 99  99 98


 


O2 Delivery Simple Mask Mask Nasal Cannula Nasal Cannula


 


O2 Flow Rate 12 12 3 3


 


    





    





 8/21/19 8/21/19 8/21/19 8/21/19





 16:31 19:50 20:20 21:20


 


Temp 98.5 98.1  





 98.5 98.1  


 


Pulse 16 73  73


 


Resp 16 19  


 


B/P (MAP) 150/69 (96) 157/70 (99)  157/70


 


Pulse Ox 98 97  


 


O2 Delivery Nasal Cannula Room Air Room Air 


 


O2 Flow Rate 2.0   


 


    





    





 8/21/19 8/21/19 8/21/19 8/22/19





 21:20 23:45 23:50 03:33


 


Temp  98.3  98.1





  98.3  98.1


 


Pulse  70  81


 


Resp  18  19


 


B/P (MAP)  152/66 (94)  152/67 (95)


 


Pulse Ox  96  96


 


O2 Delivery Nasal Cannula Room Air Room Air Room Air


 


    





    





 8/22/19 8/22/19 8/22/19 8/22/19





 07:00 08:00 08:59 08:59


 


Temp 97.8   





 97.8   


 


Pulse 66  77 77


 


Resp 18   


 


B/P (MAP) 150/65 (93)  150/65 150/65


 


Pulse Ox 97   


 


O2 Delivery Room Air Room Air  


 


    





    





 8/22/19 8/22/19 8/22/19 8/22/19





 08:59 10:56 11:23 15:00


 


Temp  98.2  98.0





  98.2  98.0


 


Pulse  78  75


 


Resp  18  18


 


B/P (MAP)  152/58 (89)  161/59 (93)


 


Pulse Ox 97 97  97


 


O2 Delivery Room Air Room Air Room Air Room Air














Intake and Output   


 


 8/21/19 8/21/19 8/22/19





 15:00 23:00 07:00


 


Intake Total  160 ml 300 ml


 


Output Total  200 ml 250 ml


 


Balance  -40 ml 50 ml








Images


[place]:


LEFT VENTRICLE 


The left ventricle is normal size. There is mild concentric left ventricular 

hypertrophy. The left ventricular systolic function is hyperdynamic. The 

ejection fraction is >65%. There is normal LV segmental wall motion. No left 

ventricle thrombus noted on this study.





 RIGHT VENTRICLE 


The right ventricle is normal size. There is normal right ventricular wall 

thickness. The right ventricular systolic function is normal.





 ATRIA 


The left atrium size is normal. The right atrium size is normal. The interatrial

septum is intact with no evidence for an atrial septal defect or patent foramen 

ovale as noted on 2-D or Doppler imaging. There is no thrombus noted in the left

atrial appendage.





 AORTIC VALVE 


The aortic valve is mildly calcified. Doppler and Color Flow revealed no 

significant aortic regurgitation.





 MITRAL VALVE 


Mitral annular calcification is mild to moderate. There is no evidence of mitral

valve prolapse. There is a mean pressure gradient of 4 mmHg. Doppler and Color-

flow revealed trace to mild mitral regurgitation.





 TRICUSPID VALVE 


The tricuspid valve is normal in structure and function. Doppler and Color Flow 

revealed trace tricuspid regurgitation. There is no tricuspid valve stenosis.





 PULMONIC VALVE 


The pulmonary valve is normal in structure and function. Doppler and Color Flow 

revealed trace to mild pulmonic valvular regurgitation.





 GREAT VESSELS 


The aortic root is normal in size. Normal pulmonary venous flow (Doppler).





 PERICARDIAL EFFUSION 


There is no evidence of significant pericardial effusion.





Critical Notification


Critical Value: No





<Conclusion>


The left ventricular systolic function is hyperdynamic.


The ejection fraction is >65%.


There is normal LV segmental wall motion.


Trace to mild mitral regurgitation.


Trace tricuspid regurgitation.


There is no evidence of significant pericardial effusion.


No intracardiac vegetation or thrombus.











JEAN BUTLER MD           Aug 22, 2019 15:49

## 2019-08-22 NOTE — CARD
MR#: [id number]

Account#: [id number]

Accession#: [id number]

Date of Study: 08/21/2019

Ordering Physician: BRANDI VÁZQUEZ, 

Referring Physician: BRANDI VÁZQUEZ, 

Tech: Buffy Simmons





--------------- APPROVED REPORT --------------





EXAM: Transesophageal echocardiogram with color flow Doppler.



INDICATION

CVA/TIA 

Mitral Valve Disease 



Mitral Valve

MV DECEL [id number] E Mean Gr.4mmHg

MV [id number] (PHT)3.04cm2



Reason For Test : Rule out Intracardiac Thrombus.



PROCEDURE

After obtaining informed consent, patient underwent transesophageal echo in the PACU.

Type of Sedation : General Anesthesia

Sedation was administered by Pardeep Pro CRNA. 

Sedation was achieved with Propofol 100mg intravenously. 

Transesophageal probe was inserted and advanced into esophagus by David Silva MD.

The NORBERT was performed without complications. 

Throughout the procedure, the blood pressure, pulse oximetry, cardiac rhythm, and rate were monitored
.

The patient tolerated the procedure without adverse effects. Recovery from general anesthesia was une
ventful and vital signs were stable.



 LEFT VENTRICLE 

The left ventricle is normal size. There is mild concentric left ventricular hypertrophy. The left ve
ntricular systolic function is hyperdynamic. The ejection fraction is >65%. There is normal LV segmen
javid wall motion. No left ventricle thrombus noted on this study.



 RIGHT VENTRICLE 

The right ventricle is normal size. There is normal right ventricular wall thickness. The right ventr
icular systolic function is normal.



 ATRIA 

The left atrium size is normal. The right atrium size is normal. The interatrial septum is intact wit
h no evidence for an atrial septal defect or patent foramen ovale as noted on 2-D or Doppler imaging.
 There is no thrombus noted in the left atrial appendage.



 AORTIC VALVE 

The aortic valve is mildly calcified. Doppler and Color Flow revealed no significant aortic regurgita
tion.



 MITRAL VALVE 

Mitral annular calcification is mild to moderate. There is no evidence of mitral valve prolapse. Ther
e is a mean pressure gradient of 4 mmHg. Doppler and Color-flow revealed trace to mild mitral regurgi
tation.



 TRICUSPID VALVE 

The tricuspid valve is normal in structure and function. Doppler and Color Flow revealed trace tricus
pid regurgitation. There is no tricuspid valve stenosis.



 PULMONIC VALVE 

The pulmonary valve is normal in structure and function. Doppler and Color Flow revealed trace to mil
d pulmonic valvular regurgitation.



 GREAT VESSELS 

The aortic root is normal in size. Normal pulmonary venous flow (Doppler).



 PERICARDIAL EFFUSION 

There is no evidence of significant pericardial effusion.



Critical Notification

Critical Value: No



<Conclusion>

The left ventricular systolic function is hyperdynamic.

The ejection fraction is >65%.

There is normal LV segmental wall motion.

Trace to mild mitral regurgitation.

Trace tricuspid regurgitation.

There is no evidence of significant pericardial effusion.

No intracardiac vegetation or thrombus.



Signed by : David Silva, 

Electronically Approved : 08/22/2019 07:01:11

## 2019-08-23 NOTE — PDOC
PROGRESS NOTES


History of Present Illness


History of Present Illness





VTE Prophylaxis Ordered


VTE Prophylaxis Devices:  No


VTE Pharmacological Prophylaxi:  Yes





discharge dx ==============








Assessment/Plan





impression





1.  Nonobstructive coronary artery disease


2.  Hyperdynamic left ventricle systolic function with ejection fraction 

estimated at 80%


3. non-STEMI is most probably type 2/demand ischemia. 


4. 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 

white 


matter change.


5. diabetes


6. hypertension


7. dizziness


8. dyspnea


9. on echo Ejection Fraction is >65%.


10.There is mild concentric left ventricular hypertrophy.


11. mild valvular aortic stenosis. 


by echo . 


Calculated aortic valve area is 1.4 cm2 with maximum pressure gradient of 14 

mmHg and mean pressure gradient of 10 mmHg.


12.  shortness of breath //probably anginal equivalent. Chest x-ray did not show

any evidence of congestive heart failure.


13.  new onset confusion, memory loss, metabolic encephalopathy


14. component of hypertensive encephalopathy, possible concussion


15, CVA, MULTIPLE areas


focus of restricted diffusion of the anterior left 


cerebellum about 1 cm in size, corresponding T2 and FLAIR hyperintense 


signal. There is also small focus of restricted diffusion of the posterior


medial left cerebellum about 0.6 cm in size, hypointense on ADC map. There


could be a very small focus of restricted diffusion of the right occipital


lobe about 0.4 cm although limited evaluation due to artifact in this 


region. There is a small focus of restricted diffusion of the left 


temporal lobe along the periventricular white matter about 0.5 cm. There 


is a 0.4 cm focus of restricted diffusion of the posterior right centrum 


semiovale. There is probable very small focus of restricted diffusion 


right parietal cortical surface about 0.2 cm otherwise difficult to 


characterize given small size. There is small 0.3 cm focus of somewhat 


faint restricted diffusion of the right frontal lobe and also separate 


small focus of the right frontal cortical surface about 0.3 cm in size.





plan





cont  cvc 


cardiac cath  reviewed


medical management.


accuchecks


risk reduction mgt


cardiology consulted


pt/ot


neurology consult


mri head 


event monitor, possible NORBERT


Add clopidogrel, discontinue aspirin after a week


Discharge Recommendations 


* Skilled Nursing Unit











13  min pt exam, chart review d/c planning  , > 50% of time spent with exam, 

chart review, pt care coordination





Vitals


Vitals





Vital Signs








  Date Time  Temp Pulse Resp B/P (MAP) Pulse Ox O2 Delivery O2 Flow Rate FiO2


 


8/22/19 15:00 98.0 75 18 161/59 (93) 97 Room Air  





 98.0       











Physical Exam


General:  Alert, Cooperative, No acute distress


Heart:  Regular rate, Normal S1, Normal S2, Other (ESM LUPSB)


Lungs:  Clear


Abdomen:  Normal bowel sounds, Soft, No tenderness


Extremities:  No clubbing, No cyanosis


Skin:  No significant lesion





Labs


LABS





Laboratory Tests








Test


 8/22/19


11:46


 


Glucose (Fingerstick)


 212 mg/dL


(70-99)











Assessment and Plan


Assessmemt and Plan


Problems


Medical Problems:


(1) Diabetes


Status: Chronic  





(2) Dyspnea


Status: Acute  





(3) HTN (hypertension)


Status: Chronic  





(4) Hypertensive encephalopathy


Status: Acute  





(5) Metabolic encephalopathy


Status: Acute  





(6) NSTEMI (non-ST elevated myocardial infarction)


Status: Acute  





(7) Valvular stenosis, aortic


Status: Chronic  











Comment


Review of Relevant


I have reviewed the following items lorenzo (where applicable) has been applied.


Labs





Laboratory Tests








Test


 8/21/19


12:41 8/21/19


17:36 8/21/19


20:47 8/22/19


03:40


 


Glucose (Fingerstick)


 162 mg/dL


(70-99) 160 mg/dL


(70-99) 144 mg/dL


(70-99) 





 


White Blood Count


 


 


 


 7.8 x10^3/uL


(4.0-11.0)


 


Red Blood Count


 


 


 


 3.65 x10^6/uL


(3.50-5.40)


 


Hemoglobin


 


 


 


 10.6 g/dL


(12.0-15.5)


 


Hematocrit


 


 


 


 30.7 %


(36.0-47.0)


 


Mean Corpuscular Volume    84 fL () 


 


Mean Corpuscular Hemoglobin    29 pg (25-35) 


 


Mean Corpuscular Hemoglobin


Concent 


 


 


 35 g/dL


(31-37)


 


Red Cell Distribution Width


 


 


 


 13.3 %


(11.5-14.5)


 


Platelet Count


 


 


 


 292 x10^3/uL


(140-400)


 


Neutrophils (%) (Auto)    75 % (31-73) 


 


Lymphocytes (%) (Auto)    14 % (24-48) 


 


Monocytes (%) (Auto)    9 % (0-9) 


 


Eosinophils (%) (Auto)    2 % (0-3) 


 


Basophils (%) (Auto)    0 % (0-3) 


 


Neutrophils # (Auto)


 


 


 


 5.8 x10^3/uL


(1.8-7.7)


 


Lymphocytes # (Auto)


 


 


 


 1.1 x10^3/uL


(1.0-4.8)


 


Monocytes # (Auto)


 


 


 


 0.7 x10^3/uL


(0.0-1.1)


 


Eosinophils # (Auto)


 


 


 


 0.1 x10^3/uL


(0.0-0.7)


 


Basophils # (Auto)


 


 


 


 0.0 x10^3/uL


(0.0-0.2)


 


Test


 8/22/19


03:45 8/22/19


07:22 8/22/19


11:46 





 


Sodium Level


 134 mmol/L


(136-145) 


 


 





 


Potassium Level


 4.1 mmol/L


(3.5-5.1) 


 


 





 


Chloride Level


 100 mmol/L


() 


 


 





 


Carbon Dioxide Level


 25 mmol/L


(21-32) 


 


 





 


Anion Gap 9 (6-14)    


 


Blood Urea Nitrogen


 19 mg/dL


(7-20) 


 


 





 


Creatinine


 1.0 mg/dL


(0.6-1.0) 


 


 





 


Estimated GFR


(Cockcroft-Gault) 52.3 


 


 


 





 


BUN/Creatinine Ratio 19 (6-20)    


 


Glucose Level


 118 mg/dL


(70-99) 


 


 





 


Calcium Level


 8.6 mg/dL


(8.5-10.1) 


 


 





 


Total Bilirubin


 0.5 mg/dL


(0.2-1.0) 


 


 





 


Aspartate Amino Transf


(AST/SGOT) 21 U/L (15-37) 


 


 


 





 


Alanine Aminotransferase


(ALT/SGPT) 20 U/L (14-59) 


 


 


 





 


Alkaline Phosphatase


 64 U/L


() 


 


 





 


Total Protein


 6.6 g/dL


(6.4-8.2) 


 


 





 


Albumin


 3.0 g/dL


(3.4-5.0) 


 


 





 


Albumin/Globulin Ratio 0.8 (1.0-1.7)    


 


Glucose (Fingerstick)


 


 121 mg/dL


(70-99) 212 mg/dL


(70-99) 











Laboratory Tests








Test


 8/22/19


11:46


 


Glucose (Fingerstick)


 212 mg/dL


(70-99)








Medications





Current Medications


Aspirin (Ecotrin) 324 mg 1X  ONCE PO  Last administered on 8/19/19at 03:10;  

Start 8/19/19 at 03:30;  Stop 8/19/19 at 03:31;  Status DC


Ondansetron HCl (Zofran) 4 mg PRN Q8HRS  PRN IV NAUSEA/VOMITING 1ST CHOICE;  

Start 8/19/19 at 04:30;  Stop 8/20/19 at 04:29;  Status DC


Sodium Chloride 1,000 ml @  75 mls/hr [id number] IV  Last administered on 8/19/19at

04:41;  Start 8/19/19 at 05:00;  Stop 8/20/19 at 04:59;  Status DC


Lidocaine HCl (Xylocaine-Mpf 1% 2ml Vial) 2 ml STK-MED ONCE .ROUTE ;  Start 8/19 /19 at 09:30;  Stop 8/19/19 at 09:31;  Status DC


Iohexol (Omnipaque 300 Mg/ml) 100 ml STK-MED ONCE .ROUTE ;  Start 8/19/19 at 09

:30;  Stop 8/19/19 at 09:31;  Status DC


Heparin Sodium/ Sodium Chloride 500 ml @  As Directed STK-MED ONCE .ROUTE ;  

Start 8/19/19 at 09:30;  Stop 8/19/19 at 09:31;  Status DC


Fentanyl Citrate (Fentanyl 2ml Vial) 100 mcg STK-MED ONCE .ROUTE ;  Start 8/19/1

9 at 09:48;  Stop 8/19/19 at 09:48;  Status DC


Midazolam HCl (Versed) 2 mg STK-MED ONCE .ROUTE ;  Start 8/19/19 at 09:48;  Stop

8/19/19 at 09:48;  Status DC


Heparin Sodium (Porcine) (Heparin Sodium) 10,000 unit STK-MED ONCE .ROUTE ;  

Start 8/19/19 at 09:48;  Stop 8/19/19 at 09:48;  Status DC


Verapamil HCl (Verapamil) 5 mg STK-MED ONCE .ROUTE ;  Start 8/19/19 at 09:48;  

Stop 8/19/19 at 09:48;  Status DC


Nitroglycerin (Nitroglycerin) 200 mcg STK-MED ONCE .ROUTE ;  Start 8/19/19 at 

09:48;  Stop 8/19/19 at 09:49;  Status DC


Iohexol (Omnipaque 300 Mg/ml) 100 ml STK-MED ONCE .ROUTE ;  Start 8/19/19 at 

10:24;  Stop 8/19/19 at 10:24;  Status DC


Nitroglycerin (Nitroglycerin) 200 mcg 1X  ONCE IART  Last administered on 

8/19/19at 10:54;  Start 8/19/19 at 10:45;  Stop 8/19/19 at 10:46;  Status DC


Verapamil HCl (Verapamil) 2.5 mg 1X  ONCE IART  Last administered on 8/19/19at 

10:54;  Start 8/19/19 at 10:45;  Stop 8/19/19 at 10:46;  Status DC


Heparin Sodium (Porcine) (Heparin Sodium) 2,500 unit 1X  ONCE IART  Last 

administered on 8/19/19at 10:54;  Start 8/19/19 at 10:45;  Stop 8/19/19 at 

10:46;  Status DC


Heparin Sodium/ Sodium Chloride (HEPARIN for ARTERIAL LINE FLUSH) 1,000 unit 1X 

ONCE IART  Last administered on 8/19/19at 10:54;  Start 8/19/19 at 10:45;  Stop 

8/19/19 at 10:46;  Status DC


Midazolam HCl (Versed) 2 mg 1X  ONCE IV  Last administered on 8/19/19at 10:54;  

Start 8/19/19 at 10:45;  Stop 8/19/19 at 10:46;  Status DC


Fentanyl Citrate (Fentanyl 2ml Vial) 100 mcg 1X  ONCE IV  Last administered on 

8/19/19at 10:54;  Start 8/19/19 at 10:45;  Stop 8/19/19 at 10:46;  Status DC


Iohexol (Omnipaque 300 Mg/ml) 100 ml 1X  ONCE IART  Last administered on 

8/19/19at 10:54;  Start 8/19/19 at 10:45;  Stop 8/19/19 at 10:46;  Status DC


Lidocaine HCl (Xylocaine-Mpf 1% 2ml Vial) 2 ml 1X  ONCE INJ  Last administered 

on 8/19/19at 10:54;  Start 8/19/19 at 10:45;  Stop 8/19/19 at 10:46;  Status DC


Info (CONTRAST GIVEN -- Rx MONITORING) 1 each PRN DAILY  PRN MC SEE COMMENTS;  

Start 8/19/19 at 11:00;  Stop 8/21/19 at 10:59;  Status DC


Sodium Chloride 1,000 ml @  100 mls/hr Q10H IV  Last administered on 8/22/19at 

08:59;  Start 8/19/19 at 10:49;  Stop 8/22/19 at 10:59;  Status DC


Nitroglycerin (Nitrostat) 0.4 mg PRN Q5MIN  PRN SL CHEST PAIN;  Start 8/19/19 at

11:00


Acetaminophen (Tylenol) 325 mg DAILY PO  Last administered on 8/22/19at 08:59;  

Start 8/20/19 at 09:00


Amlodipine Besylate (Norvasc) 5 mg DAILY PO  Last administered on 8/22/19at 

08:59;  Start 8/20/19 at 09:00


Aspirin (Ecotrin) 325 mg DAILY PO  Last administered on 8/21/19at 08:19;  Start 

8/20/19 at 09:00;  Stop 8/21/19 at 09:00;  Status DC


Insulin Glargine (Lantus) 17 units QHS SQ  Last administered on 8/21/19at 21:20;

 Start 8/19/19 at 21:00


Lisinopril (Prinivil) 10 mg BID PO  Last administered on 8/22/19at 08:59;  Start

8/19/19 at 21:00


Calcium/Vitamin D (Oscal D 500mg/ 200uts) 1 tab DAILY PO  Last administered on 

8/22/19at 08:59;  Start 8/20/19 at 09:00


Insulin Human Lispro (HumaLOG) 5 units DAILYWBKFT SQ  Last administered on 

8/22/19at 09:10;  Start 8/19/19 at 17:00


Insulin Human Lispro (HumaLOG) 10 units DAILYWSUP SQ  Last administered on 

8/21/19at 18:04;  Start 8/19/19 at 17:00


Multivitamins (Thera M Plus) 1 tab DAILY PO  Last administered on 8/22/19at 

08:59;  Start 8/20/19 at 09:00


Non-Formulary Medication (Ubidecarenone (Coq-10)) 100 mg DAILY PO ;  Start 

8/20/19 at 09:00;  Status UNV


Vitamin E 200 unit DAILY PO  Last administered on 8/22/19at 08:59;  Start 

8/20/19 at 09:00


Morphine Sulfate (Morphine Sulfate) 2 mg PRN Q4HRS  PRN IV PAIN;  Start 8/19/19 

at 20:15


Tramadol HCl (Ultram) 50 mg PRN Q6HRS  PRN PO PAIN Last administered on 

8/22/19at 08:59;  Start 8/19/19 at 20:15


Insulin Human Lispro (HumaLOG) 0-5 UNITS TIDWMEALS SQ  Last administered on 

8/22/19at 12:37;  Start 8/20/19 at 12:00


Dextrose (Dextrose 50%-Water Syringe) 12.5 gm PRN Q15MIN  PRN IV SEE COMMENTS;  

Start 8/20/19 at 12:15


Dextrose 250 ml PRN Q15MIN  PRN IV SEE COMMENTS;  Start 8/20/19 at 12:15;  Stop 

8/20/19 at 12:11;  Status DC


Acetaminophen (Tylenol) 650 mg PRN Q6HRS  PRN PO TEMP > 100.4F;  Start 8/20/19 

at 14:45


Acetaminophen (Tylenol Supp) 650 mg PRN Q4HRS  PRN TX TEMP > 100.4F;  Start 

8/20/19 at 14:45


Aspirin (Ecotrin) 325 mg DAILYWBKFT PO ;  Start 8/21/19 at 08:00;  Status Cancel


Aspirin (Aspirin Rectal Supp) 300 mg PRN DAILY  PRN TX IF UNABLE TO TAKE PO;  

Start 8/20/19 at 14:45;  Stop 8/21/19 at 09:00;  Status DC


Atorvastatin Calcium (Lipitor) 20 mg QHS PO  Last administered on 8/21/19at 

21:20;  Start 8/21/19 at 21:00


Aspirin (Ecotrin) 81 mg DAILY PO ;  Start 8/21/19 at 09:00;  Stop 8/21/19 at 

12:51;  Status DC


Clopidogrel Bisulfate (Plavix) 75 mg DAILYWBKFT PO  Last administered on 

8/22/19at 08:59;  Start 8/21/19 at 09:00


Benzocaine (Hurricaine One) 3 spray 1X  ONCE MM  Last administered on 8/21/19at 

15:45;  Start 8/21/19 at 10:15;  Stop 8/21/19 at 10:19;  Status DC


Lidocaine HCl (Xylocaine 2% Topical 30gm Tube) 1 aroldo 1X  ONCE TP  Last 

administered on 8/21/19at 15:45;  Start 8/21/19 at 10:15;  Stop 8/21/19 at 

10:19;  Status DC


Lidocaine HCl (Viscous Lidocaine) 15 ml 1X  ONCE SWSW ;  Start 8/21/19 at 10:15;

 Stop 8/21/19 at 10:19;  Status DC


Ondansetron HCl (Zofran) 4 mg PRN Q6HRS  PRN IV NAUSEA/VOMITING;  Start 8/21/19 

at 10:45;  Stop 8/22/19 at 10:44;  Status DC


Fentanyl Citrate (Fentanyl 2ml Vial) 25 mcg PRN Q5MIN  PRN IV MILD PAIN 1-3;  

Start 8/21/19 at 10:45;  Stop 8/22/19 at 10:44;  Status DC


Fentanyl Citrate (Fentanyl 2ml Vial) 50 mcg PRN Q5MIN  PRN IV MODERATE TO SEVERE

PAIN;  Start 8/21/19 at 10:45;  Stop 8/22/19 at 10:44;  Status DC


Morphine Sulfate (Morphine Sulfate) 1 mg PRN Q10MIN  PRN IV SEVERE PAIN 7-10;  

Start 8/21/19 at 10:45;  Stop 8/22/19 at 10:44;  Status DC


Ringer's Solution 1,000 ml @  30 mls/hr Q24H IV  Last administered on 8/21/19at 

15:47;  Start 8/21/19 at 10:41;  Stop 8/21/19 at 22:40;  Status DC


Lidocaine HCl (Xylocaine-Mpf 1% 2ml Vial) 2 ml PRN 1X  PRN ID PRIOR TO IV START;

 Start 8/21/19 at 10:45;  Stop 8/22/19 at 10:44;  Status DC


Hydromorphone HCl (Dilaudid) 0.5 mg PRN Q10MIN  PRN IV SEV PAIN, Second choice; 

Start 8/21/19 at 10:45;  Stop 8/22/19 at 10:44;  Status DC


Prochlorperazine Edisylate (Compazine) 5 mg PACU PRN  PRN IV NAUSEA, MRX1;  

Start 8/21/19 at 10:45;  Stop 8/22/19 at 10:44;  Status DC


Aspirin (Ecotrin) 81 mg DAILYWBKFT PO  Last administered on 8/22/19at 08:59;  

Start 8/22/19 at 08:00


Lidocaine HCl (Viscous Lidocaine) 15 ml STK-MED ONCE .ROUTE ;  Start 8/21/19 at 

14:53;  Stop 8/21/19 at 14:53;  Status DC


Benzocaine (Hurricaine One) 1 spray STK-MED ONCE .ROUTE ;  Start 8/21/19 at 

14:53;  Stop 8/21/19 at 14:54;  Status DC


Lidocaine HCl (Xylocaine 2% Topical 30gm Tube) 30 aroldo STK-MED ONCE TP ;  Start 

8/21/19 at 14:54;  Stop 8/21/19 at 14:55;  Status DC


Propofol 20 ml @ As Directed STK-MED ONCE IV ;  Start 8/21/19 at 15:12;  Stop 

8/21/19 at 15:13;  Status DC


Propofol 20 ml @ As Directed STK-MED ONCE IV ;  Start 8/21/19 at 15:12;  Stop 

8/21/19 at 15:13;  Status DC


Lidocaine HCl (Lidocaine Pf 2% Vial) 5 ml STK-MED ONCE .ROUTE ;  Start 8/21/19 

at 15:12;  Stop 8/21/19 at 15:13;  Status DC





Active Scripts


Active


Aspirin Ec (Aspirin) 81 Mg Tablet.dr 81 Mg PO DAILYWBKFT 30 Days


Atorvastatin Calcium 20 Mg Tablet 20 Mg PO QHS 30 Days


Clopidogrel (Clopidogrel Bisulfate) 75 Mg Tablet 75 Mg PO DAILYWBKFT 30 Days


Reported


Novolog Flexpen (Insulin Aspart) 100 Unit/1 Ml Insuln.pen 10 Unit SQ DAILYWSUP


Novolog Flexpen (Insulin Aspart) 100 Unit/1 Ml Insuln.pen 5 Unit SQ DAILYWBKFT


Vitamin E (Vitamin E Acid Succinate) 100 Unit Tablet 100 Unit PO DAILY


Coq-10 (Ubidecarenone) 100 Mg Capsule 100 Mg PO DAILY


Lantus Solostar (Insulin Glargine,Hum.rec.anlog) 100 Unit/1 Ml Insuln.pen 17 

Unit SQ QHS


Caltrate 600 + D Soft Chew Tab (Calcium Carbonate/Vitamin D3) 1 Each Tab.chew 1 

Each PO DAILY


One Daily For Women 50+ Adv Tb (Multivitamins-Min/Fa/Ginkgo) 1 Each Tablet 1 

Each PO DAILY


Tylenol (Acetaminophen) 325 Mg Tablet 325 Mg PO DAILY


Amlodipine Besylate 5 Mg Tablet 5 Mg PO DAILY


Zestril (Lisinopril) 10 Mg Tablet 10 Mg PO BID


Vitals/I & O





Vital Sign - Last 24 Hours








 8/22/19 8/22/19 8/22/19 8/22/19





 08:59 08:59 08:59 10:56


 


Temp    98.2





    98.2


 


Pulse 77 77  78


 


Resp    18


 


B/P (MAP) 150/65 150/65  152/58 (89)


 


Pulse Ox   97 97


 


O2 Delivery   Room Air Room Air


 


    





    





 8/22/19 8/22/19  





 11:23 15:00  


 


Temp  98.0  





  98.0  


 


Pulse  75  


 


Resp  18  


 


B/P (MAP)  161/59 (93)  


 


Pulse Ox  97  


 


O2 Delivery Room Air Room Air  














Intake and Output   


 


 8/22/19 8/22/19 8/23/19





 15:00 23:00 07:00


 


Output Total 200 ml  


 


Balance -200 ml  

















SOFIA PAUL MD          Aug 23, 2019 08:21

## 2019-10-25 ENCOUNTER — HOSPITAL ENCOUNTER (EMERGENCY)
Dept: HOSPITAL 61 - ER | Age: 84
Discharge: HOME | End: 2019-10-25
Payer: MEDICARE

## 2019-10-25 VITALS — SYSTOLIC BLOOD PRESSURE: 162 MMHG | DIASTOLIC BLOOD PRESSURE: 66 MMHG

## 2019-10-25 VITALS — HEIGHT: 60 IN | WEIGHT: 128 LBS | BODY MASS INDEX: 25.13 KG/M2
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
## 2019-10-25 DIAGNOSIS — E11.9: ICD-10-CM

## 2019-10-25 DIAGNOSIS — E78.00: ICD-10-CM
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
## 2019-10-25 DIAGNOSIS — R42: ICD-10-CM

## 2019-10-25 DIAGNOSIS — Z90.49: ICD-10-CM

## 2019-10-25 DIAGNOSIS — R51: ICD-10-CM
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
## 2019-10-25 DIAGNOSIS — E87.1: Primary | ICD-10-CM

## 2019-10-25 DIAGNOSIS — I25.10: ICD-10-CM

## 2019-10-25 LAB
% ATYL: 1 % (ref 0–0)
% BANDS: 3 % (ref 0–9)
% LYMPHS: 7 % (ref 24–48)
% MONOS: 7 % (ref 0–10)
% SEGS: 82 % (ref 35–66)
ALBUMIN SERPL-MCNC: 3.6 G/DL (ref 3.4–5)
ALBUMIN/GLOB SERPL: 0.9 {RATIO} (ref 1–1.7)
ALP SERPL-CCNC: 69 U/L (ref 46–116)
ALT SERPL-CCNC: 22 U/L (ref 14–59)
ANION GAP SERPL CALC-SCNC: 11 MMOL/L (ref 6–14)
APTT PPP: YELLOW S
AST SERPL-CCNC: 20 U/L (ref 15–37)
BACTERIA #/AREA URNS HPF: (no result) /HPF
BASOPHILS # BLD AUTO: 0 X10^3/UL (ref 0–0.2)
BASOPHILS NFR BLD: 0 % (ref 0–3)
BILIRUB SERPL-MCNC: 0.9 MG/DL (ref 0.2–1)
BILIRUB UR QL STRIP: NEGATIVE
BUN SERPL-MCNC: 18 MG/DL (ref 7–20)
BUN/CREAT SERPL: 20 (ref 6–20)
CALCIUM SERPL-MCNC: 9.7 MG/DL (ref 8.5–10.1)
CHLORIDE SERPL-SCNC: 90 MMOL/L (ref 98–107)
CO2 SERPL-SCNC: 26 MMOL/L (ref 21–32)
CREAT SERPL-MCNC: 0.9 MG/DL (ref 0.6–1)
EOSINOPHIL NFR BLD: 0 % (ref 0–3)
EOSINOPHIL NFR BLD: 0 X10^3/UL (ref 0–0.7)
ERYTHROCYTE [DISTWIDTH] IN BLOOD BY AUTOMATED COUNT: 12.9 % (ref 11.5–14.5)
FIBRINOGEN PPP-MCNC: CLEAR MG/DL
GFR SERPLBLD BASED ON 1.73 SQ M-ARVRAT: 59.1 ML/MIN
GLOBULIN SER-MCNC: 4 G/DL (ref 2.2–3.8)
GLUCOSE SERPL-MCNC: 131 MG/DL (ref 70–99)
HCT VFR BLD CALC: 30.5 % (ref 36–47)
HGB BLD-MCNC: 10.8 G/DL (ref 12–15.5)
LYMPHOCYTES # BLD: 0.8 X10^3/UL (ref 1–4.8)
LYMPHOCYTES NFR BLD AUTO: 7 % (ref 24–48)
MCH RBC QN AUTO: 29 PG (ref 25–35)
MCHC RBC AUTO-ENTMCNC: 35 G/DL (ref 31–37)
MCV RBC AUTO: 82 FL (ref 79–100)
MONO #: 0.7 X10^3/UL (ref 0–1.1)
MONOCYTES NFR BLD: 7 % (ref 0–9)
NEUT #: 9.3 X10^3/UL (ref 1.8–7.7)
NEUTROPHILS NFR BLD AUTO: 86 % (ref 31–73)
NITRITE UR QL STRIP: NEGATIVE
PH UR STRIP: 6 [PH]
PLATELET # BLD AUTO: 376 X10^3/UL (ref 140–400)
PLATELET # BLD EST: ADEQUATE 10*3/UL
POTASSIUM SERPL-SCNC: 4.5 MMOL/L (ref 3.5–5.1)
PROT SERPL-MCNC: 7.6 G/DL (ref 6.4–8.2)
PROT UR STRIP-MCNC: 100 MG/DL
RBC # BLD AUTO: 3.73 X10^6/UL (ref 3.5–5.4)
RBC #/AREA URNS HPF: 0 /HPF (ref 0–2)
SODIUM SERPL-SCNC: 127 MMOL/L (ref 136–145)
SQUAMOUS #/AREA URNS LPF: (no result) /LPF
UROBILINOGEN UR-MCNC: 1 MG/DL
WBC # BLD AUTO: 10.8 X10^3/UL (ref 4–11)
WBC #/AREA URNS HPF: (no result) /HPF (ref 0–4)

## 2019-10-25 PROCEDURE — 70450 CT HEAD/BRAIN W/O DYE: CPT

## 2019-10-25 PROCEDURE — 85025 COMPLETE CBC W/AUTO DIFF WBC: CPT

## 2019-10-25 PROCEDURE — 80053 COMPREHEN METABOLIC PANEL: CPT

## 2019-10-25 PROCEDURE — 87086 URINE CULTURE/COLONY COUNT: CPT

## 2019-10-25 PROCEDURE — 36415 COLL VENOUS BLD VENIPUNCTURE: CPT

## 2019-10-25 PROCEDURE — 85007 BL SMEAR W/DIFF WBC COUNT: CPT

## 2019-10-25 PROCEDURE — 81001 URINALYSIS AUTO W/SCOPE: CPT

## 2019-10-25 NOTE — RAD
CT HEAD WO CONTRAST

 

History: Fall. Head injury.

 

Comparison: August 19, 2019

 

Technique: Noncontrast CT imaging was performed of the head.  

 

Exposure: One or more of the following individualized dose reduction 

techniques were utilized for this examination:  

1. Automated exposure control  

2. Adjustment of the mA and/or kV according to patient size  

3. Use of iterative reconstruction technique.

 

Findings: 

No intracranial hemorrhage. No mass effect.

 

Mild brain parenchymal volume loss. Mildly prominent lateral ventricles, 

likely related to central brain parenchymal volume loss. Moderate foci of 

decreased attenuation within the hemispheric white matter, most often due 

to chronic microvascular ischemia.

 

Imaged orbits are unremarkable. Imaged paranasal sinuses and mastoid air 

cells are clear. No acute calvarial fracture.

 

Impression:

1.  No acute intracranial abnormality. 

2.  Moderate sequela chronic microvascular ischemia.

3.  Mild brain parenchymal volume loss.

 

Electronically signed by: Braeden Wallace DO (10/25/2019 12:25 PM) [id number]

## 2019-10-25 NOTE — PHYS DOC
Past Medical History


Past Medical History:  CAD, Diabetes-Type II, High Cholesterol, Hypertension


Past Surgical History:  Cholecystectomy


Alcohol Use:  None


Drug Use:  None





Adult General


Chief Complaint


Chief Complaint:  MECHANICAL FALL





HPI


HPI





88-year-old female presents to the emergency Department complaints of fall. Audrey

ent has a history of diabetes, hypertension, history of urinary tract infection.

She was seen her primary care physician's office today and referred to the 

emergency department for further evaluation. Patient fell yesterday hitting her 

head, unknown loss of consciousness. She's had increasing drowsiness over the 

last 24 hours compared to normal. She denies any chest pain, shortness of 

breath, nausea or vomiting. She does complain of headache.





Review of Systems


Review of Systems





Constitutional: Denies fever or chills []


Respiratory: Denies cough or shortness of breath []


Cardiovascular: No additional information not addressed in HPI []


GI: Denies abdominal pain, nausea, vomiting, bloody stools or diarrhea []


: Denies dysuria or hematuria []


Musculoskeletal: Denies back pain or joint pain []


Integument: Denies rash or skin lesions []


Neurologic: Denies headache, focal weakness or sensory changes []





All other systems were reviewed and found to be within normal limits, except as 

documented in this note.





Allergies


Allergies





Allergies








Coded Allergies Type Severity Reaction Last Updated Verified


 


  No Known Drug Allergies    10/8/17 No











Physical Exam


Physical Exam





Constitutional: Well developed, well nourished, no acute distress, non-toxic 

appearance. []


HENT: Normocephalic, atraumatic, bilateral external ears normal, oropharynx 

moist, no oral exudates, nose normal. []


Eyes: PERRLA, EOMI, conjunctiva normal, no discharge. [] 


Neck: Normal range of motion, no tenderness, supple, no stridor. [] 


Cardiovascular:Heart rate regular rhythm, no murmur []


Lungs & Thorax:  Bilateral breath sounds clear to auscultation []


Abdomen: Bowel sounds normal, soft, no pulsatile masses. [] 


Skin: Warm, dry, no erythema, no rash. [] 


Back: No tenderness, no CVA tenderness. [] 


Extremities: No tenderness, no edema. [] 


Neurologic: Alert and oriented X 3, no focal deficits noted. []


Psychologic: Affect normal, judgement normal, mood normal. []





Current Patient Data


Vital Signs





                                   Vital Signs








  Date Time  Temp Pulse Resp B/P (MAP) Pulse Ox O2 Delivery O2 Flow Rate FiO2


 


10/25/19 11:32 98.9 80 18 167/69 (101) 99 Room Air  





 98.9       








Lab Values





                                Laboratory Tests








Test


 10/25/19


11:55 10/25/19


12:05


 


Urine Collection Type Unknown   


 


Urine Color Yellow   


 


Urine Clarity Clear   


 


Urine pH 6.0   


 


Urine Specific Gravity 1.015   


 


Urine Protein


 100 mg/dL


(NEG-TRACE) 





 


Urine Glucose (UA)


 Negative mg/dL


(NEG) 





 


Urine Ketones (Stick)


 Negative mg/dL


(NEG) 





 


Urine Blood


 Negative (NEG)


 





 


Urine Nitrite


 Negative (NEG)


 





 


Urine Bilirubin


 Negative (NEG)


 





 


Urine Urobilinogen Dipstick


 1.0 mg/dL (0.2


mg/dL) 





 


Urine Leukocyte Esterase Trace (NEG)   


 


Urine RBC 0 /HPF (0-2)   


 


Urine WBC


 5-10 /HPF


(0-4) 





 


Urine Squamous Epithelial


Cells Mod /LPF  


 





 


Urine Transitional Epithelial


Cells Few /LPF  


 





 


Urine Bacteria


 Few /HPF


(0-FEW) 





 


Urine Mucus Mod /LPF   


 


White Blood Count


 


 10.8 x10^3/uL


(4.0-11.0)


 


Red Blood Count


 


 3.73 x10^6/uL


(3.50-5.40)


 


Hemoglobin


 


 10.8 g/dL


(12.0-15.5)  L


 


Hematocrit


 


 30.5 %


(36.0-47.0)  L


 


Mean Corpuscular Volume


 


 82 fL ()





 


Mean Corpuscular Hemoglobin  29 pg (25-35)  


 


Mean Corpuscular Hemoglobin


Concent 


 35 g/dL


(31-37)


 


Red Cell Distribution Width


 


 12.9 %


(11.5-14.5)


 


Platelet Count


 


 376 x10^3/uL


(140-400)


 


Neutrophils (%) (Auto)  86 % (31-73)  H


 


Lymphocytes (%) (Auto)  7 % (24-48)  L


 


Monocytes (%) (Auto)  7 % (0-9)  


 


Eosinophils (%) (Auto)  0 % (0-3)  


 


Basophils (%) (Auto)  0 % (0-3)  


 


Neutrophils # (Auto)


 


 9.3 x10^3/uL


(1.8-7.7)  H


 


Lymphocytes # (Auto)


 


 0.8 x10^3/uL


(1.0-4.8)  L


 


Monocytes # (Auto)


 


 0.7 x10^3/uL


(0.0-1.1)


 


Eosinophils # (Auto)


 


 0.0 x10^3/uL


(0.0-0.7)


 


Basophils # (Auto)


 


 0.0 x10^3/uL


(0.0-0.2)


 


Segmented Neutrophils %  82 % (35-66)  H


 


Band Neutrophils %  3 % (0-9)  


 


Lymphocytes %  7 % (24-48)  L


 


Atypical Lymphocytes %


(Manual) 


 1 % (0-0)  H





 


Monocytes %  7 % (0-10)  


 


Platelet Estimate


 


 Adequate


(ADEQUATE)


 


Sodium Level


 


 127 mmol/L


(136-145)  L


 


Potassium Level


 


 4.5 mmol/L


(3.5-5.1)


 


Chloride Level


 


 90 mmol/L


()  L


 


Carbon Dioxide Level


 


 26 mmol/L


(21-32)


 


Anion Gap  11 (6-14)  


 


Blood Urea Nitrogen


 


 18 mg/dL


(7-20)


 


Creatinine


 


 0.9 mg/dL


(0.6-1.0)


 


Estimated GFR


(Cockcroft-Gault) 


 59.1  





 


BUN/Creatinine Ratio  20 (6-20)  


 


Glucose Level


 


 131 mg/dL


(70-99)  H


 


Calcium Level


 


 9.7 mg/dL


(8.5-10.1)


 


Total Bilirubin


 


 0.9 mg/dL


(0.2-1.0)


 


Aspartate Amino Transferase


(AST) 


 20 U/L (15-37)





 


Alanine Aminotransferase (ALT)


 


 22 U/L (14-59)





 


Alkaline Phosphatase


 


 69 U/L


()


 


Total Protein


 


 7.6 g/dL


(6.4-8.2)


 


Albumin


 


 3.6 g/dL


(3.4-5.0)


 


Albumin/Globulin Ratio


 


 0.9 (1.0-1.7)


L





                                Laboratory Tests


10/25/19 12:05








                                Laboratory Tests


10/25/19 12:05











EKG


EKG


[]





Radiology/Procedures


Radiology/Procedures


[facility]


                    [address] [place] [address] [phone]


                                        


                                 IMAGING REPORT





                                     Signed





PATIENT: OSCAR VILLAFANA   ACCOUNT: [id number]     MRN#: [id number]


: 1930           LOCATION: ER              AGE: 88


SEX: F                    EXAM DT: 10/25/19         ACCESSION#: [id number]


STATUS: REG ER            ORD. PHYSICIAN: ALLISON CHESTER MD


REASON: fall, head injury


PROCEDURE: CT HEAD WO CONTRAST





CT HEAD WO CONTRAST


 


History: Fall. Head injury.


 


Comparison: 2019


 


Technique: Noncontrast CT imaging was performed of the head.  


 


Exposure: One or more of the following individualized dose reduction 


techniques were utilized for this examination:  


1. Automated exposure control  


2. Adjustment of the mA and/or kV according to patient size  


3. Use of iterative reconstruction technique.


 


Findings: 


No intracranial hemorrhage. No mass effect.


 


Mild brain parenchymal volume loss. Mildly prominent lateral ventricles, 


likely related to central brain parenchymal volume loss. Moderate foci of 


decreased attenuation within the hemispheric white matter, most often due 


to chronic microvascular ischemia.


 


Imaged orbits are unremarkable. Imaged paranasal sinuses and mastoid air 


cells are clear. No acute calvarial fracture.


 


Impression:


1.  No acute intracranial abnormality. 


2.  Moderate sequela chronic microvascular ischemia.


3.  Mild brain parenchymal volume loss.


 


Electronically signed by: Braeden Wallace DO (10/25/2019 12:25 PM) [facility]














DICTATED and SIGNED BY:     BRAEDEN WALLACE DO


DATE:     10/25/19 1225


[]





Course & Med Decision Making


Course & Med Decision Making


Pertinent Labs and Imaging studies reviewed. (See chart for details)





[]88-year-old female presents to the emergency Department complaints of fall. 

Patient has a history of diabetes, hypertension, history of urinary tract 

infection. She was seen her primary care physician's office today and referred 

to the emergency department for further evaluation. Patient fell yesterday 

hitting her head, unknown loss of consciousness. She's had increasing drowsiness

 over the last 24 hours compared to normal. She denies any chest pain, shortness

 of breath, nausea or vomiting. She does complain of headache.





Labs/Imaging reviewed


No UTI appreciated


Discussed dc with patient and family at bedside





Dragon Disclaimer


Dragon Disclaimer


This electronic medical record was generated, in whole or in part, using a voice

 recognition dictation system.





Departure


Departure


Impression:  


   Primary Impression:  


   Fall


   Additional Impression:  


   Hyponatremia


Disposition:  01 HOME, SELF-CARE


Condition:  STABLE


Referrals:  


ROSALIA ELIZONDO MD (PCP)


Patient Instructions:  Fall Prevention and Home Safety, Easy-to-Read, 

Hyponatremia, Easy-to-Read





Additional Instructions:  


Recommend follow up with PCP 3 - 5 days


Return to the ER with worsening symptoms, intractable pain, fever, altered 

mental status


Tylenol/Motrin as needed for pain


Take salt tabs daily x 3 days


Take steroid rx as prescribed for 5 days


Scripts


[prednisone]   No Conflict Check


TAB PO DAILY for 10 Days


   Prov: ALLISON CHESTER MD         10/25/19 


[sodium chloride]   No Conflict Check


GM PO DAILY for 3 Days, #3


   Prov: ALLISON CHESTER MD         10/25/19 


[sodium]   No Conflict Check





   Prov: ALLISON CHESTER MD         10/25/19





Problem Qualifiers








   Primary Impression:  


   Fall


   Encounter type:  initial encounter  Qualified Codes:  W19.XXXA - Unspecified 

   fall, initial encounter








ALLISON CHESTER MD              Oct 25, 2019 11:38

## 2019-11-03 ENCOUNTER — HOSPITAL ENCOUNTER (INPATIENT)
Dept: HOSPITAL 61 - ER | Age: 84
LOS: 3 days | Discharge: NURSING, REHAB OR LONG-TERM CARE | DRG: 640 | End: 2019-11-06
Attending: INTERNAL MEDICINE | Admitting: INTERNAL MEDICINE
Payer: MEDICARE

## 2019-11-03 VITALS — SYSTOLIC BLOOD PRESSURE: 152 MMHG | DIASTOLIC BLOOD PRESSURE: 51 MMHG

## 2019-11-03 VITALS — BODY MASS INDEX: 23.36 KG/M2 | HEIGHT: 60 IN | WEIGHT: 119 LBS

## 2019-11-03 VITALS — DIASTOLIC BLOOD PRESSURE: 70 MMHG | SYSTOLIC BLOOD PRESSURE: 197 MMHG

## 2019-11-03 VITALS — SYSTOLIC BLOOD PRESSURE: 156 MMHG | DIASTOLIC BLOOD PRESSURE: 61 MMHG

## 2019-11-03 VITALS — SYSTOLIC BLOOD PRESSURE: 157 MMHG | DIASTOLIC BLOOD PRESSURE: 58 MMHG

## 2019-11-03 VITALS — DIASTOLIC BLOOD PRESSURE: 57 MMHG | SYSTOLIC BLOOD PRESSURE: 165 MMHG

## 2019-11-03 VITALS — DIASTOLIC BLOOD PRESSURE: 58 MMHG | SYSTOLIC BLOOD PRESSURE: 144 MMHG

## 2019-11-03 DIAGNOSIS — E78.5: ICD-10-CM

## 2019-11-03 DIAGNOSIS — E87.1: ICD-10-CM

## 2019-11-03 DIAGNOSIS — I10: ICD-10-CM

## 2019-11-03 DIAGNOSIS — Z79.02: ICD-10-CM

## 2019-11-03 DIAGNOSIS — F41.9: ICD-10-CM

## 2019-11-03 DIAGNOSIS — E78.00: ICD-10-CM

## 2019-11-03 DIAGNOSIS — F32.9: ICD-10-CM

## 2019-11-03 DIAGNOSIS — I25.10: ICD-10-CM

## 2019-11-03 DIAGNOSIS — F03.90: ICD-10-CM

## 2019-11-03 DIAGNOSIS — Z86.73: ICD-10-CM

## 2019-11-03 DIAGNOSIS — G93.41: ICD-10-CM

## 2019-11-03 DIAGNOSIS — E04.1: ICD-10-CM

## 2019-11-03 DIAGNOSIS — E83.42: Primary | ICD-10-CM

## 2019-11-03 DIAGNOSIS — E11.9: ICD-10-CM

## 2019-11-03 LAB
ALBUMIN SERPL-MCNC: 3.6 G/DL (ref 3.4–5)
ALBUMIN/GLOB SERPL: 1 {RATIO} (ref 1–1.7)
ALP SERPL-CCNC: 65 U/L (ref 46–116)
ALT SERPL-CCNC: 20 U/L (ref 14–59)
ANION GAP SERPL CALC-SCNC: 8 MMOL/L (ref 6–14)
APTT PPP: YELLOW S
AST SERPL-CCNC: 18 U/L (ref 15–37)
BACTERIA #/AREA URNS HPF: (no result) /HPF
BASOPHILS # BLD AUTO: 0 X10^3/UL (ref 0–0.2)
BASOPHILS NFR BLD: 1 % (ref 0–3)
BILIRUB SERPL-MCNC: 0.5 MG/DL (ref 0.2–1)
BILIRUB UR QL STRIP: NEGATIVE
BUN SERPL-MCNC: 22 MG/DL (ref 7–20)
BUN/CREAT SERPL: 28 (ref 6–20)
CALCIUM SERPL-MCNC: 9 MG/DL (ref 8.5–10.1)
CHLORIDE SERPL-SCNC: 97 MMOL/L (ref 98–107)
CO2 SERPL-SCNC: 29 MMOL/L (ref 21–32)
CREAT SERPL-MCNC: 0.8 MG/DL (ref 0.6–1)
EOSINOPHIL NFR BLD: 0.1 X10^3/UL (ref 0–0.7)
EOSINOPHIL NFR BLD: 1 % (ref 0–3)
ERYTHROCYTE [DISTWIDTH] IN BLOOD BY AUTOMATED COUNT: 13.2 % (ref 11.5–14.5)
FIBRINOGEN PPP-MCNC: CLEAR MG/DL
GFR SERPLBLD BASED ON 1.73 SQ M-ARVRAT: 67.7 ML/MIN
GLOBULIN SER-MCNC: 3.6 G/DL (ref 2.2–3.8)
GLUCOSE SERPL-MCNC: 55 MG/DL (ref 70–99)
HBA1C MFR BLD: 6.8 % (ref 4.8–5.6)
HCT VFR BLD CALC: 30.3 % (ref 36–47)
HGB BLD-MCNC: 10.5 G/DL (ref 12–15.5)
HYALINE CASTS #/AREA URNS LPF: (no result) /HPF
INFLUENZA A PATIENT: NEGATIVE
INFLUENZA B PATIENT: NEGATIVE
LYMPHOCYTES # BLD: 1.2 X10^3/UL (ref 1–4.8)
LYMPHOCYTES NFR BLD AUTO: 13 % (ref 24–48)
MCH RBC QN AUTO: 29 PG (ref 25–35)
MCHC RBC AUTO-ENTMCNC: 35 G/DL (ref 31–37)
MCV RBC AUTO: 83 FL (ref 79–100)
MONO #: 0.8 X10^3/UL (ref 0–1.1)
MONOCYTES NFR BLD: 9 % (ref 0–9)
NEUT #: 7.2 X10^3/UL (ref 1.8–7.7)
NEUTROPHILS NFR BLD AUTO: 77 % (ref 31–73)
NITRITE UR QL STRIP: NEGATIVE
PH UR STRIP: 6.5 [PH]
PLATELET # BLD AUTO: 439 X10^3/UL (ref 140–400)
POTASSIUM SERPL-SCNC: 3.9 MMOL/L (ref 3.5–5.1)
PROT SERPL-MCNC: 7.2 G/DL (ref 6.4–8.2)
PROT UR STRIP-MCNC: 100 MG/DL
RBC # BLD AUTO: 3.64 X10^6/UL (ref 3.5–5.4)
RBC #/AREA URNS HPF: (no result) /HPF (ref 0–2)
SODIUM SERPL-SCNC: 134 MMOL/L (ref 136–145)
SQUAMOUS #/AREA URNS LPF: (no result) /LPF
UROBILINOGEN UR-MCNC: 1 MG/DL
WBC # BLD AUTO: 9.4 X10^3/UL (ref 4–11)
WBC #/AREA URNS HPF: (no result) /HPF (ref 0–4)

## 2019-11-03 PROCEDURE — 82607 VITAMIN B-12: CPT

## 2019-11-03 PROCEDURE — 83735 ASSAY OF MAGNESIUM: CPT

## 2019-11-03 PROCEDURE — 70450 CT HEAD/BRAIN W/O DYE: CPT

## 2019-11-03 PROCEDURE — 36415 COLL VENOUS BLD VENIPUNCTURE: CPT

## 2019-11-03 PROCEDURE — 82962 GLUCOSE BLOOD TEST: CPT

## 2019-11-03 PROCEDURE — 81001 URINALYSIS AUTO W/SCOPE: CPT

## 2019-11-03 PROCEDURE — 82550 ASSAY OF CK (CPK): CPT

## 2019-11-03 PROCEDURE — 82140 ASSAY OF AMMONIA: CPT

## 2019-11-03 PROCEDURE — 72125 CT NECK SPINE W/O DYE: CPT

## 2019-11-03 PROCEDURE — 85025 COMPLETE CBC W/AUTO DIFF WBC: CPT

## 2019-11-03 PROCEDURE — 80053 COMPREHEN METABOLIC PANEL: CPT

## 2019-11-03 PROCEDURE — 93306 TTE W/DOPPLER COMPLETE: CPT

## 2019-11-03 PROCEDURE — 83036 HEMOGLOBIN GLYCOSYLATED A1C: CPT

## 2019-11-03 PROCEDURE — 84436 ASSAY OF TOTAL THYROXINE: CPT

## 2019-11-03 PROCEDURE — 95816 EEG AWAKE AND DROWSY: CPT

## 2019-11-03 PROCEDURE — 84443 ASSAY THYROID STIM HORMONE: CPT

## 2019-11-03 PROCEDURE — 85651 RBC SED RATE NONAUTOMATED: CPT

## 2019-11-03 PROCEDURE — 70551 MRI BRAIN STEM W/O DYE: CPT

## 2019-11-03 PROCEDURE — G0378 HOSPITAL OBSERVATION PER HR: HCPCS

## 2019-11-03 PROCEDURE — 84481 FREE ASSAY (FT-3): CPT

## 2019-11-03 PROCEDURE — 87804 INFLUENZA ASSAY W/OPTIC: CPT

## 2019-11-03 RX ADMIN — INSULIN LISPRO SCH UNITS: 100 INJECTION, SOLUTION INTRAVENOUS; SUBCUTANEOUS at 13:08

## 2019-11-03 RX ADMIN — INSULIN LISPRO SCH UNITS: 100 INJECTION, SOLUTION INTRAVENOUS; SUBCUTANEOUS at 17:00

## 2019-11-03 RX ADMIN — ATORVASTATIN CALCIUM SCH MG: 20 TABLET, FILM COATED ORAL at 21:06

## 2019-11-03 RX ADMIN — LISINOPRIL SCH MG: 10 TABLET ORAL at 09:13

## 2019-11-03 RX ADMIN — CLOPIDOGREL BISULFATE SCH MG: 75 TABLET ORAL at 09:08

## 2019-11-03 RX ADMIN — INSULIN LISPRO SCH UNITS: 100 INJECTION, SOLUTION INTRAVENOUS; SUBCUTANEOUS at 08:30

## 2019-11-03 RX ADMIN — VITAMIN D, TAB 1000IU (100/BT) SCH UNIT: 25 TAB at 09:08

## 2019-11-03 RX ADMIN — ASPIRIN SCH MG: 81 TABLET, COATED ORAL at 09:08

## 2019-11-03 RX ADMIN — INSULIN GLARGINE SCH UNIT: 100 INJECTION, SOLUTION SUBCUTANEOUS at 21:10

## 2019-11-03 RX ADMIN — CALCIUM SCH MG: 500 TABLET ORAL at 09:09

## 2019-11-03 RX ADMIN — CITALOPRAM HYDROBROMIDE SCH MG: 20 TABLET ORAL at 09:09

## 2019-11-03 NOTE — RAD
CT brain without contrast.

 

HISTORY: Confusion, unsteady gait

 

CT scan of brain was done without contrast. Comparison is made with a 

study from October 25. There is mild diffuse atrophy. There is no 

intracranial hemorrhage or subdural hematoma. There is no mass or shift of

the midline. Ventricles are mildly dilated probably related to atrophy 

without change. There is decreased density in the periventricular white 

matter suggesting chronic microvascular changes.

 

IMPRESSION:

1. Atrophy and chronic white matter changes.

2. No intracranial hemorrhage or acute CVA noted.

 

RS Compliance Statement:

 

One or more of the following individualized dose reduction techniques were

utilized for this examination:  

1. Automated exposure control  

2. Adjustment of the mA and/or kV according to patient size  

3. Use of iterative reconstruction technique

 

Electronically signed by: Jose Ortega MD (11/3/2019 2:39 AM) [facility]

## 2019-11-03 NOTE — NUR
ADMIT NOTE

The patient, OSCAR VILLAFANA, 89 y/o, F admitted by PJ ERWIN MD, was given written 
information regarding hospital policies, unit procedures and contact persons. 

Patient A&O x2, afebrile, with elevated BP upon admission, all other vitals stable. 

Admit packet, home medication list, allergy list, and plan of care discussed with patient 
and family. 

Patient's admission assessment complete, call light within reach, and bed in lowest/locked 
position, will continue to monitor.

## 2019-11-03 NOTE — PDOC
PROGRESS NOTES


Assessment


Assessment


IMPRESSION:


Metabolic encephalopathy.


Confusion x 2 days.


Gait problem.


Generalized weakness.


HTN.


HLD.


DM.


CAD.


Anxiety.


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 





RECOMMENDATIONS/PLAN:


Continue Plavix 75 mg daily.


Continue Lipitor HS.


Brain MRI w/o contrast.


EEG.


Lab: see orders.


OT/PT.


Discussed with her son and daughter-in-law at bedside on 11/3/19.





History of Present Illness


This is an 88-year-old [place] origin female patient with above medical 

diseases and stroke in 8/19. She was noted to have mental status changes, 

confusion, decreased speech and decreased activity for about 2 days to be 

brought to the ER of [facility] for further evaluation. She also was noted gait 

problems. She had stroke in 8/19 and had rehab and she covered well post stroke,

but her mentation, speech and gait changed since the day before yesterday per 

her family.








Past Medical History


Cardiovascular:  HTN, Syncope, HLD, CAD.


Hepatobiliary:  No pertinent hx


Psych:  Anxiety, Depression


Musculoskeletal:   low back pain, Osteoarthritis


Endocrine:  Diabetes





Past Surgical History


Cholecystectomy, Cataract Removal





Family History


High Cholesterol





ALLERGY:


NKDA





MEDICATIONS:


Refer to MAR





SOCIAL HISTORY: 


Lives at home with her son and daughter-in-law.


Denies smoking, drinking, and illicit drug use.  





REVIEW OF SYSTEMS:


Constitutional: No malnutrition, weight loss, cachexia.


Head: No traumatic brain or head injury.


Skin: No edema, or rash.


Ear: No infection.


Eyes: No vision loss or color blindness.


Nose: No bleeding or purulent discharges.


Hearing: No hearing decrease.


Neck: No injury.


Breast: No history of cancer, masses,or discharges.


Cardiac: CAD, HTN, HLD.


Pulmonary: No COPD.


GI: No GI ulcer, GI bleeding.


Urinary/genital: UTI.


Endocrinologic: Diabetes Mellitus.


Skeletomuscular: Generalized weakness.


Neurological: see  HP.


Psychiatric: Denies drug use/abuse.


Otherwise, not pertinant14-point review of systems.





PHYSICAL EXAMINATION:   


General appearance is in subacute distress.  


HEENT:  Normocephalic and nontraumatic.  Eyes, nose, ears, and throat are 

unremarkable.  


Neck is supple. No lymphadenopathy. No bruits are heard over the carotid artery.

 No crepitus. 


Cardiovascular:  S1, S2, regular rate and rhythm.  


Pulmonary:  Clear to auscultation bilaterally. 


Abdomen:  Bowel sounds are positive.  Abdomen is soft, nontender, and 

nondistended.    


Extremities:  No rash, lesions, or edema.  


No restriction of range of motion





NEUROLOGICAL  EXAMINATION:


Alert 


Not oriented to time, place but knew person.


PERRL.


EOMI.


CN: no focal findings.


Muscle tone: within normal.


Muscle strength: 4+


DTR: 2


Plantar reflex: Flexor response bilaterally 


Gait: not examined in bed. 


Sensory exam: no abnormal findings.


No cerebellar signs elicited.


F-T-N test fine.





Objective


Objective





Vital Signs








  Date Time  Temp Pulse Resp B/P (MAP) Pulse Ox O2 Delivery O2 Flow Rate FiO2


 


11/3/19 11:00 98.6 114 20 197/70 (112) 96 Room Air  





 98.6       














Intake and Output 


 


 11/3/19





 07:00


 


 


 


# Voids 2











Vitals Signs


Vitals





                          VS - Last 72 Hours, by Label








  Date Time  Temp Pulse Resp B/P (MAP) Pulse Ox O2 Delivery O2 Flow Rate FiO2


 


11/3/19 11:00 98.6 114 20 197/70 (112) 96 Room Air  





 98.6       


 


11/3/19 09:13  92  152/51    


 


11/3/19 09:09  92  152/51    


 


11/3/19 08:00      Room Air  


 


11/3/19 07:00 97.8 92 16 152/51 (84) 98 Room Air  





 97.8       


 


11/3/19 05:30 98.1 84 16 165/57 (93) 96 Room Air  





 98.1       


 


11/3/19 04:30      Room Air  


 


11/3/19 04:26 98.5 84 16  97   





 98.5       


 


11/3/19 01:40 97.8 77 22 173/75 (107) 99 Room Air  





 97.8       











Laboratory


Laboratory





Laboratory Tests








Test


 11/3/19


02:05 11/3/19


02:15 11/3/19


04:18 11/3/19


08:13


 


White Blood Count


 9.4 x10^3/uL


(4.0-11.0) 


 


 





 


Red Blood Count


 3.64 x10^6/uL


(3.50-5.40) 


 


 





 


Hemoglobin


 10.5 g/dL


(12.0-15.5) 


 


 





 


Hematocrit


 30.3 %


(36.0-47.0) 


 


 





 


Mean Corpuscular Volume 83 fL ()    


 


Mean Corpuscular Hemoglobin 29 pg (25-35)    


 


Mean Corpuscular Hemoglobin


Concent 35 g/dL


(31-37) 


 


 





 


Red Cell Distribution Width


 13.2 %


(11.5-14.5) 


 


 





 


Platelet Count


 439 x10^3/uL


(140-400) 


 


 





 


Neutrophils (%) (Auto) 77 % (31-73)    


 


Lymphocytes (%) (Auto) 13 % (24-48)    


 


Monocytes (%) (Auto) 9 % (0-9)    


 


Eosinophils (%) (Auto) 1 % (0-3)    


 


Basophils (%) (Auto) 1 % (0-3)    


 


Neutrophils # (Auto)


 7.2 x10^3/uL


(1.8-7.7) 


 


 





 


Lymphocytes # (Auto)


 1.2 x10^3/uL


(1.0-4.8) 


 


 





 


Monocytes # (Auto)


 0.8 x10^3/uL


(0.0-1.1) 


 


 





 


Eosinophils # (Auto)


 0.1 x10^3/uL


(0.0-0.7) 


 


 





 


Basophils # (Auto)


 0.0 x10^3/uL


(0.0-0.2) 


 


 





 


Sodium Level


 134 mmol/L


(136-145) 


 


 





 


Potassium Level


 3.9 mmol/L


(3.5-5.1) 


 


 





 


Chloride Level


 97 mmol/L


() 


 


 





 


Carbon Dioxide Level


 29 mmol/L


(21-32) 


 


 





 


Anion Gap 8 (6-14)    


 


Blood Urea Nitrogen


 22 mg/dL


(7-20) 


 


 





 


Creatinine


 0.8 mg/dL


(0.6-1.0) 


 


 





 


Estimated GFR


(Cockcroft-Gault) 67.7 


 


 


 





 


BUN/Creatinine Ratio 28 (6-20)    


 


Glucose Level


 55 mg/dL


(70-99) 


 


 





 


Calcium Level


 9.0 mg/dL


(8.5-10.1) 


 


 





 


Total Bilirubin


 0.5 mg/dL


(0.2-1.0) 


 


 





 


Aspartate Amino Transf


(AST/SGOT) 18 U/L (15-37) 


 


 


 





 


Alanine Aminotransferase


(ALT/SGPT) 20 U/L (14-59) 


 


 


 





 


Alkaline Phosphatase


 65 U/L


() 


 


 





 


Ammonia


 < 10 mcmol/L


(11-34) 


 


 





 


Total Protein


 7.2 g/dL


(6.4-8.2) 


 


 





 


Albumin


 3.6 g/dL


(3.4-5.0) 


 


 





 


Albumin/Globulin Ratio 1.0 (1.0-1.7)    


 


Urine Collection Type  U cath   


 


Urine Color  Yellow   


 


Urine Clarity  Clear   


 


Urine pH  6.5   


 


Urine Specific Gravity  1.010   


 


Urine Protein


 


 100 mg/dL


(NEG-TRACE) 


 





 


Urine Glucose (UA)


 


 Negative mg/dL


(NEG) 


 





 


Urine Ketones (Stick)


 


 Negative mg/dL


(NEG) 


 





 


Urine Blood  Negative (NEG)   


 


Urine Nitrite  Negative (NEG)   


 


Urine Bilirubin  Negative (NEG)   


 


Urine Urobilinogen Dipstick


 


 1.0 mg/dL (0.2


mg/dL) 


 





 


Urine Leukocyte Esterase  Negative (NEG)   


 


Urine RBC  1-2 /HPF (0-2)   


 


Urine WBC  1-4 /HPF (0-4)   


 


Urine Squamous Epithelial


Cells 


 Mod /LPF 


 


 





 


Urine Bacteria


 


 Few /HPF


(0-FEW) 


 





 


Urine Hyaline Casts


 


 Occasional


/HPF 


 





 


Glucose (Fingerstick)


 


 


 92 mg/dL


(70-99) 109 mg/dL


(70-99)


 


Test


 11/3/19


09:30 11/3/19


11:01 


 





 


Erythrocyte Sedimentation Rate 31 (0-25)    


 


Magnesium Level


 1.5 mg/dL


(1.8-2.4) 


 


 





 


Creatine Kinase


 79 U/L


() 


 


 





 


Thyroid Stimulating Hormone


(TSH) 0.045 uIU/mL


(0.358-3.74) 


 


 





 


Glucose (Fingerstick)


 


 264 mg/dL


(70-99) 


 














Medication


Medications





Current Medications


Acetaminophen (Tylenol) 500 mg PRN Q6HRS  PRN PO MILD PAIN / TEMP Last 

administered on 11/3/19at 13:01;  Start 11/3/19 at 08:15


Acetaminophen (Tylenol) 650 mg PRN Q6HRS  PRN PO PAIN;  Start 11/3/19 at 12:45


Acetaminophen/ Codeine Phosphate (Tylenol #3) 1 tab PRN Q6HRS  PRN PO MODERATE 

PAIN;  Start 11/3/19 at 08:15


Amlodipine Besylate (Norvasc) 5 mg DAILY PO  Last administered on 11/3/19at 

09:09;  Start 11/3/19 at 09:00


Aspirin (Ecotrin) 81 mg DAILYWBKFT PO  Last administered on 11/3/19at 09:08;  

Start 11/3/19 at 08:30


Atorvastatin Calcium (Lipitor) 20 mg QHS PO ;  Start 11/3/19 at 21:00


Calcium Carbonate/ Glycine (Oscal) 500 mg DAILY PO  Last administered on 

11/3/19at 09:09;  Start 11/3/19 at 09:00


Calcium Carbonate/ Glycine (Tums) 500 mg PRN AFTMEALHC  PRN PO INDIGESTION;  

Start 11/3/19 at 08:15


Citalopram Hydrobromide (CeleXA) 20 mg DAILY PO  Last administered on 11/3/19at 

09:09;  Start 11/3/19 at 09:00


Clonidine HCl (Catapres) 0.1 mg PRN Q1HR  PRN PO HYPERTENSION;  Start 11/3/19 at

08:15


Clopidogrel Bisulfate (Plavix) 75 mg DAILYWBKFT PO  Last administered on 

11/3/19at 09:08;  Start 11/3/19 at 08:30


Cyclobenzaprine HCl (Flexeril) 10 mg PRN Q6HRS  PRN PO MUSCLE SPASMS Last 

administered on 11/3/19at 13:01;  Start 11/3/19 at 12:45


Dextrose (Dextrose 50%-Water Syringe) 12.5 gm PRN Q15MIN  PRN IV SEE COMMENTS;  

Start 11/3/19 at 08:15


Insulin Glargine (Lantus Syringe) 12 unit QHS SQ ;  Start 11/3/19 at 21:00


Insulin Human Lispro (HumaLOG) 0-9 UNITS TIDWMEALS SQ  Last administered on 

11/3/19at 13:08;  Start 11/3/19 at 12:00


Insulin Human Lispro (HumaLOG) 4 units DAILYWBKFT SQ ;  Start 11/3/19 at 08:30


Insulin Human Lispro (HumaLOG) 7 units DAILYWSUP SQ ;  Start 11/3/19 at 17:00


Lisinopril (Prinivil) 10 mg DAILY PO  Last administered on 11/3/19at 09:13;  

Start 11/3/19 at 09:00


Metformin HCl (Glucophage) 500 mg BIDWMEALS PO  Last administered on 11/3/19at 

09:08;  Start 11/3/19 at 08:30


Non-Formulary Medication (Ubidecarenone (Coq-10)) 100 mg DAILY PO ;  Start 

11/3/19 at 09:00;  Status UNV


Ondansetron HCl (Zofran) 4 mg PRN Q6HRS  PRN IVP NAUSEA/VOMITING;  Start 11/3/19

at 08:15


Quinine Sulfate (Qualaquin) 324 mg PRN Q8HRS  PRN PO MUSCLE CRAMPS;  Start 

11/3/19 at 14:00


Vitamin D (Vitamin D3) 1,000 unit DAILY PO  Last administered on 11/3/19at 

09:08;  Start 11/3/19 at 09:00





Comment


Review of Relevant


I have reviewed the following items lorenzo (where applicable) has been applied.











AHMET POON MD                  Nov 3, 2019 14:22

## 2019-11-03 NOTE — PHYS DOC
Past Medical History


Past Medical History:  CAD, CVA, Depression, Diabetes-Type II, High Cholesterol,

Hypertension, UTI


Additional Past Medical Histor:  HEART MURMUR


Past Surgical History:  Cholecystectomy


Alcohol Use:  None


Drug Use:  None





Adult General


Chief Complaint


Chief Complaint:  ALTERED MENTAL STATUS





HPI


HPI





88-year-old female presents to the emergency department with increased 

confusion, family states she's been confused between day and night. She's had no

fever, nausea, vomiting, diarrhea. She slowly respond has had evidence of 

weakness as well as gait disturbance. Patient's underlying history of 

hypertension, hyperlipidemia, chronic hyponatremia. She is responding 

appropriately to questions however is slow to respond. She denies any acute 

complaints of pain on examination. Family is concerned about acute infection at 

this time. She has recently been evaluated per her primary care physician on 

Friday and was doing well.  They recently has changes over the last 1-2 days.





Review of Systems


Review of Systems





Constitutional: Denies fever or chills []


Respiratory: Denies cough or shortness of breath []


Cardiovascular: No additional information not addressed in HPI []


GI: Denies abdominal pain, nausea, vomiting, bloody stools or diarrhea []


: Denies dysuria or hematuria []


Neurologic: Denies headache, focal weakness or sensory changes []





All other systems were reviewed and found to be within normal limits, except as 

documented in this note.





Allergies


Allergies





Allergies








Coded Allergies Type Severity Reaction Last Updated Verified


 


  No Known Drug Allergies    10/8/17 No











Physical Exam


Physical Exam





Constitutional: Well developed, well nourished, no acute distress, non-toxic 

appearance. []


HENT: Normocephalic, atraumatic, bilateral external ears normal, oropharynx qian

st, no oral exudates, nose normal. []


Eyes: PERRLA, EOMI, conjunctiva normal, no discharge. [] 


Cardiovascular:Heart rate regular rhythm, no murmur []


Lungs & Thorax:  Bilateral breath sounds clear to auscultation []


Abdomen: Bowel sounds normal, soft, no tenderness, no masses, no pulsatile 

masses. [] 


Skin: Warm, dry, no erythema, no rash. [] 


Back: No tenderness, no CVA tenderness. [] 


Extremities: No tenderness, no edema. [] 


Neurologic: Alert and oriented X 3, no focal deficits noted. []


Psychologic: Affect normal, judgement normal, mood normal. []





Current Patient Data


Vital Signs





                                   Vital Signs








  Date Time  Temp Pulse Resp B/P (MAP) Pulse Ox O2 Delivery O2 Flow Rate FiO2


 


11/3/19 01:40 97.8 77 22 173/75 (107) 99 Room Air  





 97.8       








Lab Values





                                Laboratory Tests








Test


 11/3/19


02:05 11/3/19


02:15


 


White Blood Count


 9.4 x10^3/uL


(4.0-11.0) 





 


Red Blood Count


 3.64 x10^6/uL


(3.50-5.40) 





 


Hemoglobin


 10.5 g/dL


(12.0-15.5)  L 





 


Hematocrit


 30.3 %


(36.0-47.0)  L 





 


Mean Corpuscular Volume


 83 fL ()


 





 


Mean Corpuscular Hemoglobin 29 pg (25-35)   


 


Mean Corpuscular Hemoglobin


Concent 35 g/dL


(31-37) 





 


Red Cell Distribution Width


 13.2 %


(11.5-14.5) 





 


Platelet Count


 439 x10^3/uL


(140-400)  H 





 


Neutrophils (%) (Auto) 77 % (31-73)  H 


 


Lymphocytes (%) (Auto) 13 % (24-48)  L 


 


Monocytes (%) (Auto) 9 % (0-9)   


 


Eosinophils (%) (Auto) 1 % (0-3)   


 


Basophils (%) (Auto) 1 % (0-3)   


 


Neutrophils # (Auto)


 7.2 x10^3/uL


(1.8-7.7) 





 


Lymphocytes # (Auto)


 1.2 x10^3/uL


(1.0-4.8) 





 


Monocytes # (Auto)


 0.8 x10^3/uL


(0.0-1.1) 





 


Eosinophils # (Auto)


 0.1 x10^3/uL


(0.0-0.7) 





 


Basophils # (Auto)


 0.0 x10^3/uL


(0.0-0.2) 





 


Sodium Level


 134 mmol/L


(136-145)  L 





 


Potassium Level


 3.9 mmol/L


(3.5-5.1) 





 


Chloride Level


 97 mmol/L


()  L 





 


Carbon Dioxide Level


 29 mmol/L


(21-32) 





 


Anion Gap 8 (6-14)   


 


Blood Urea Nitrogen


 22 mg/dL


(7-20)  H 





 


Creatinine


 0.8 mg/dL


(0.6-1.0) 





 


Estimated GFR


(Cockcroft-Gault) 67.7  


 





 


BUN/Creatinine Ratio 28 (6-20)  H 


 


Glucose Level


 55 mg/dL


(70-99)  L 





 


Calcium Level


 9.0 mg/dL


(8.5-10.1) 





 


Total Bilirubin


 0.5 mg/dL


(0.2-1.0) 





 


Aspartate Amino Transferase


(AST) 18 U/L (15-37)


 





 


Alanine Aminotransferase (ALT)


 20 U/L (14-59)


 





 


Alkaline Phosphatase


 65 U/L


() 





 


Ammonia


 < 10 mcmol/L


(11-34)  L 





 


Total Protein


 7.2 g/dL


(6.4-8.2) 





 


Albumin


 3.6 g/dL


(3.4-5.0) 





 


Albumin/Globulin Ratio 1.0 (1.0-1.7)   


 


Urine Collection Type  U cath  


 


Urine Color  Yellow  


 


Urine Clarity  Clear  


 


Urine pH  6.5  


 


Urine Specific Gravity  1.010  


 


Urine Protein


 


 100 mg/dL


(NEG-TRACE)


 


Urine Glucose (UA)


 


 Negative mg/dL


(NEG)


 


Urine Ketones (Stick)


 


 Negative mg/dL


(NEG)


 


Urine Blood


 


 Negative (NEG)





 


Urine Nitrite


 


 Negative (NEG)





 


Urine Bilirubin


 


 Negative (NEG)





 


Urine Urobilinogen Dipstick


 


 1.0 mg/dL (0.2


mg/dL)


 


Urine Leukocyte Esterase


 


 Negative (NEG)





 


Urine RBC


 


 1-2 /HPF (0-2)





 


Urine WBC


 


 1-4 /HPF (0-4)





 


Urine Squamous Epithelial


Cells 


 Mod /LPF  





 


Urine Bacteria


 


 Few /HPF


(0-FEW)


 


Urine Hyaline Casts


 


 Occasional


/HPF





                                Laboratory Tests


11/3/19 02:05








                                Laboratory Tests


11/3/19 02:05











EKG


EKG


[]





Radiology/Procedures


Radiology/Procedures


[]





Course & Med Decision Making


Course & Med Decision Making


Pertinent Labs and Imaging studies reviewed. (See chart for details)





[]88-year-old female presents to the emergency department with increased 

confusion, family states she's been confused between day and night. She's had no

 fever, nausea, vomiting, diarrhea. She slowly respond has had evidence of 

weakness as well as gait disturbance. Patient's underlying history of 

hypertension, hyperlipidemia, chronic hyponatremia. She is responding 

appropriately to questions however is slow to respond. She denies any acute 

complaints of pain on examination. Family is concerned about acute infection at 

this time. She has recently been evaluated per her primary care physician on 

Friday and was doing well.  They recently has changes over the last 1-2 days.





Labs/imaging reviewed. Patient with evidence of hyponatremia however currently 

134. CT reveals no evidence of acute intracranial process. Given patient's 

confusion, altered mental status, gait disturbance will plan admission and 

further evaluation neuro consultation. Discussed with family at bedside and they

 agree.





Dragon Disclaimer


Dragon Disclaimer


This electronic medical record was generated, in whole or in part, using a voice

 recognition dictation system.





NIHSS Stroke Scale


NIH Stroke Scale:  








NIH Stroke Scale Response (Comments) Value


 


Level of Consciousness:                 0 Alert/Responsive 0


 


LOC Questions:                          0 Answers both correctly 0


 


LOC Commands:                           0 Performs both tasks 0


 


Best Gaze:                              0 Normal 0


 


Visual:                                 0 No visual loss 0


 


Facial Palsy:                           0 Normal, symmetrical 0


 


Motor - Left Arm                        0 No drift 0


 


Motor - Right Arm                       0 No drift 0


 


Motor - Left Leg                        0 No drift 0


 


Motor: Right Leg                        0 No drift 0


 


Limb Ataxia:                            0 Absent 0


 


Sensory:                                0 No loss 0


 


Best Language:                          0 Normal 0


 


Dysathria:                              0 Normal 0


 


Extinction and Inattention:             0 Normal 0


 


Total  0











Departure


Departure


Impression:  


   Primary Impression:  


   Altered mental status


   Additional Impression:  


   Gait disturbance


Disposition:  09 ADMITTED AS INPATIENT


Admitting Physician:  HIMS


Condition:  STABLE


Referrals:  


ROSALIA ELIZONDO MD (PCP)





Problem Qualifiers








   Primary Impression:  


   Altered mental status


   Altered mental status type:  unspecified  Qualified Codes:  R41.82 - Altered 

   mental status, unspecified








ALLISON CHESTER MD               Nov 3, 2019 02:06

## 2019-11-03 NOTE — RAD
CT cervical spine without contrast

 

PQRS statement: CT scans at this facility use dose reduction including 

either automated exposure control, iterative reconstructions, and /or 

weight based radiation dosing via mA and kV modification when appropriate 

to reduce radiation dose to as low as reasonably achievable.

 

HISTORY: Gait instability.

 

FINDINGS: Craniocervical junction intact. Cervical vertebral body height 

and alignment intact. No fracture of the cervical spine. Asymmetric 

enlargement of the left lower thyroid lobe could represent an isodense 3 

cm nodule. Paraspinal tissues and lung apices are unremarkable. There are 

multiple levels of disc protrusions as well as endplate spurring and 

uncovertebral and facet spurring with spinal canal and neural foraminal 

stenoses. Particularly there is a large disc protrusion contributing to 

severe spinal canal stenosis at C3-C4 although there are likely moderate 

to severe spinal canal stenoses at the lower levels of C4-C5 through C6-C7

as well. There are multiple levels of neural foraminal stenoses with the 

greatest extent of foraminal stenotic disease a moderate to severe at the 

left C3-C4 foramen and the bilateral C5-C6 and C6-C7 foramina.

 

IMPRESSION: No acute osseous injury of the cervical spine. Cervical disc 

disease and arthritic change as described above. Asymmetric enlargement 

left thyroid lobe could be due to a heterogeneous isodense 3 cm nodule.

 

Electronically signed by: Vincent White MD (11/3/2019 1:28 PM) [facility]

## 2019-11-03 NOTE — PDOC1
History and Physical


Date of Admission


Date of Admission


DATE: 11/3/19 


TIME: 10:29





Identification/Chief Complaint


Chief Complaint


confusion at home





Source


Source:  Caregiver, Chart review, Patient





History of Present Illness


History of Present Illness


SHe lives at home and has family, she has normal BMI< confusion that family got 

concerned, SHe seems back to baseline, oriented to place and self but still 

feels "off". There were reports of gait instability, she has not ambulated here 

yet. HEr labs are reassuring including imaging,. I consulted neuro who has 

ordered CT cervical spine, I also added echo bec pt claims she syncopized but th

e details are unclear, She is not the best historian and no family at bedside, I

added some TSH, cpk, esr and blood tests, FULL CODE


BP was a little bit high on arrival,





Past Medical History


Cardiovascular:  HTN, Syncope, Other


Hepatobiliary:  No pertinent hx


Psych:  Anxiety, Depression


Musculoskeletal:   low back pain, Osteoarthritis


Endocrine:  Diabetes





Past Surgical History


Past Surgical History:  Cholecystectomy, Cataract Removal





Family History


Family History:  High Cholestrol





Social History


Smoke:  No


ALCOHOL:  none


Drugs:  None





Current Problem List


Problem List


Problems


Medical Problems:


(1) Altered mental status


Status: Acute  





(2) Gait disturbance


Status: Acute  











Current Medications


Current Medications





Current Medications


Clonidine HCl (Catapres) 0.1 mg PRN Q1HR  PRN PO HYPERTENSION;  Start 11/3/19 at

08:15


Acetaminophen (Tylenol) 500 mg PRN Q6HRS  PRN PO MILD PAIN / TEMP;  Start 

11/3/19 at 08:15


Acetaminophen/ Codeine Phosphate (Tylenol #3) 1 tab PRN Q6HRS  PRN PO MODERATE 

PAIN;  Start 11/3/19 at 08:15


Ondansetron HCl (Zofran) 4 mg PRN Q6HRS  PRN IVP NAUSEA/VOMITING;  Start 11/3/19

at 08:15


Calcium Carbonate/ Glycine (Tums) 500 mg PRN AFTMEALHC  PRN PO INDIGESTION;  

Start 11/3/19 at 08:15


Amlodipine Besylate (Norvasc) 5 mg DAILY PO  Last administered on 11/3/19at 

09:09;  Start 11/3/19 at 09:00


Aspirin (Ecotrin) 81 mg DAILYWBKFT PO  Last administered on 11/3/19at 09:08;  

Start 11/3/19 at 08:30


Atorvastatin Calcium (Lipitor) 20 mg QHS PO ;  Start 11/3/19 at 21:00


Clopidogrel Bisulfate (Plavix) 75 mg DAILYWBKFT PO  Last administered on 

11/3/19at 09:08;  Start 11/3/19 at 08:30


Lisinopril (Prinivil) 10 mg DAILY PO  Last administered on 11/3/19at 09:13;  

Start 11/3/19 at 09:00


Metformin HCl (Glucophage) 500 mg BIDWMEALS PO  Last administered on 11/3/19at 

09:08;  Start 11/3/19 at 08:30


Calcium Carbonate/ Glycine (Oscal) 500 mg DAILY PO  Last administered on 

11/3/19at 09:09;  Start 11/3/19 at 09:00


Vitamin D (Vitamin D3) 1,000 unit DAILY PO  Last administered on 11/3/19at 

09:08;  Start 11/3/19 at 09:00


Citalopram Hydrobromide (CeleXA) 20 mg DAILY PO  Last administered on 11/3/19at 

09:09;  Start 11/3/19 at 09:00


Insulin Human Lispro (HumaLOG) 4 units DAILYWBKFT SQ ;  Start 11/3/19 at 08:30


Insulin Human Lispro (HumaLOG) 7 units DAILYWSUP SQ ;  Start 11/3/19 at 17:00


Insulin Glargine (Lantus Syringe) 12 unit QHS SQ ;  Start 11/3/19 at 21:00


Non-Formulary Medication (Ubidecarenone (Coq-10)) 100 mg DAILY PO ;  Start 

11/3/19 at 09:00;  Status UNV


Insulin Human Lispro (HumaLOG) 0-9 UNITS TIDWMEALS SQ ;  Start 11/3/19 at 12:00


Dextrose (Dextrose 50%-Water Syringe) 12.5 gm PRN Q15MIN  PRN IV SEE COMMENTS;  

Start 11/3/19 at 08:15





Active Scripts


Active


Aspirin Ec (Aspirin) 81 Mg Tablet. 81 Mg PO DAILYWBKFT 30 Days


Atorvastatin Calcium 20 Mg Tablet 20 Mg PO QHS 30 Days


Clopidogrel (Clopidogrel Bisulfate) 75 Mg Tablet 75 Mg PO DAILYWBKFT 30 Days


Reported


Lantus Solostar (Insulin Glargine,Hum.rec.anlog) 100 Unit/1 Ml Insuln.pen 12 

Unit SQ QHS


Novolog Flexpen (Insulin Aspart) 100 Unit/1 Ml Insuln.pen 7 Unit SQ DAILYWSUP


Novolog Flexpen (Insulin Aspart) 100 Unit/1 Ml Insuln.pen 4 Unit SQ DAILYWBKFT


Calcium (Calcium Carbonate) 600 Mg Tablet 600 Mg PO DAILY


Escitalopram Oxalate 10 Mg Tablet 1 Tab PO DAILY


Vitamin D3 (Cholecalciferol (Vitamin D3)) 1,000 Unit Capsule 1 Cap PO DAILY 30 

Days


Lisinopril 10 Mg Tablet 1 Tab PO DAILY


Metformin Hcl 500 Mg Tablet 500 Mg PO BIDWMEALS


Coq-10 (Ubidecarenone) 100 Mg Capsule 100 Mg PO DAILY


Amlodipine Besylate 5 Mg Tablet 5 Mg PO DAILY





Allergies


Allergies:  


Coded Allergies:  


     No Known Drug Allergies (Unverified , 10/8/17)





ROS


Review of System


weak, "off" all else is neg





Physical Exam


General:  Alert, Oriented X3, Cooperative, No acute distress, Other (weak 

looking but not in distress)


HEENT:  Atraumatic, PERRLA, EOMI


Lungs:  Clear to auscultation, Normal air movement


Heart:  S1S2, RRR, no thrills, no rubs, no gallops, no murmurs


Cardiovascular:  S1


Breasts:  Normal, Rt breast nml w/o mass, Lt breast nml w/o mass, Nipples normal


Abdomen:  Normal bowel sounds, Soft, No tenderness, No hepatosplenomegaly, No 

masses


Rectal Exam:  not examined


PELVIC:  Nml ext genitalia


Extremities:  No clubbing, No cyanosis, No edema, Normal pulses, No 

tenderness/swelling


Skin:  No rashes, No breakdown, No significant lesion


Neuro:  Normal gait, Normal speech, Strength at 5/5 X4 ext, Normal tone, 

Sensation intact, Cranial nerves 3-12 NL, Reflexes 2+


Psych/Mental Status:  Mental status NL, Mood NL





Vitals


Vitals





Vital Signs








  Date Time  Temp Pulse Resp B/P (MAP) Pulse Ox O2 Delivery O2 Flow Rate FiO2


 


11/3/19 09:13  92  152/51    


 


11/3/19 08:00      Room Air  


 


11/3/19 07:00 97.8  16  98   





 97.8       











Labs


Labs





Laboratory Tests








Test


 11/3/19


02:05 11/3/19


02:15 11/3/19


04:18


 


White Blood Count


 9.4 x10^3/uL


(4.0-11.0) 


 





 


Red Blood Count


 3.64 x10^6/uL


(3.50-5.40) 


 





 


Hemoglobin


 10.5 g/dL


(12.0-15.5) 


 





 


Hematocrit


 30.3 %


(36.0-47.0) 


 





 


Mean Corpuscular Volume 83 fL ()   


 


Mean Corpuscular Hemoglobin 29 pg (25-35)   


 


Mean Corpuscular Hemoglobin


Concent 35 g/dL


(31-37) 


 





 


Red Cell Distribution Width


 13.2 %


(11.5-14.5) 


 





 


Platelet Count


 439 x10^3/uL


(140-400) 


 





 


Neutrophils (%) (Auto) 77 % (31-73)   


 


Lymphocytes (%) (Auto) 13 % (24-48)   


 


Monocytes (%) (Auto) 9 % (0-9)   


 


Eosinophils (%) (Auto) 1 % (0-3)   


 


Basophils (%) (Auto) 1 % (0-3)   


 


Neutrophils # (Auto)


 7.2 x10^3/uL


(1.8-7.7) 


 





 


Lymphocytes # (Auto)


 1.2 x10^3/uL


(1.0-4.8) 


 





 


Monocytes # (Auto)


 0.8 x10^3/uL


(0.0-1.1) 


 





 


Eosinophils # (Auto)


 0.1 x10^3/uL


(0.0-0.7) 


 





 


Basophils # (Auto)


 0.0 x10^3/uL


(0.0-0.2) 


 





 


Sodium Level


 134 mmol/L


(136-145) 


 





 


Potassium Level


 3.9 mmol/L


(3.5-5.1) 


 





 


Chloride Level


 97 mmol/L


() 


 





 


Carbon Dioxide Level


 29 mmol/L


(21-32) 


 





 


Anion Gap 8 (6-14)   


 


Blood Urea Nitrogen


 22 mg/dL


(7-20) 


 





 


Creatinine


 0.8 mg/dL


(0.6-1.0) 


 





 


Estimated GFR


(Cockcroft-Gault) 67.7 


 


 





 


BUN/Creatinine Ratio 28 (6-20)   


 


Glucose Level


 55 mg/dL


(70-99) 


 





 


Calcium Level


 9.0 mg/dL


(8.5-10.1) 


 





 


Total Bilirubin


 0.5 mg/dL


(0.2-1.0) 


 





 


Aspartate Amino Transf


(AST/SGOT) 18 U/L (15-37) 


 


 





 


Alanine Aminotransferase


(ALT/SGPT) 20 U/L (14-59) 


 


 





 


Alkaline Phosphatase


 65 U/L


() 


 





 


Ammonia


 < 10 mcmol/L


(11-34) 


 





 


Total Protein


 7.2 g/dL


(6.4-8.2) 


 





 


Albumin


 3.6 g/dL


(3.4-5.0) 


 





 


Albumin/Globulin Ratio 1.0 (1.0-1.7)   


 


Urine Collection Type  U cath  


 


Urine Color  Yellow  


 


Urine Clarity  Clear  


 


Urine pH  6.5  


 


Urine Specific Gravity  1.010  


 


Urine Protein


 


 100 mg/dL


(NEG-TRACE) 





 


Urine Glucose (UA)


 


 Negative mg/dL


(NEG) 





 


Urine Ketones (Stick)


 


 Negative mg/dL


(NEG) 





 


Urine Blood  Negative (NEG)  


 


Urine Nitrite  Negative (NEG)  


 


Urine Bilirubin  Negative (NEG)  


 


Urine Urobilinogen Dipstick


 


 1.0 mg/dL (0.2


mg/dL) 





 


Urine Leukocyte Esterase  Negative (NEG)  


 


Urine RBC  1-2 /HPF (0-2)  


 


Urine WBC  1-4 /HPF (0-4)  


 


Urine Squamous Epithelial


Cells 


 Mod /LPF 


 





 


Urine Bacteria


 


 Few /HPF


(0-FEW) 





 


Urine Hyaline Casts


 


 Occasional


/HPF 





 


Glucose (Fingerstick)


 


 


 92 mg/dL


(70-99)








Laboratory Tests








Test


 11/3/19


02:05 11/3/19


02:15 11/3/19


04:18


 


White Blood Count


 9.4 x10^3/uL


(4.0-11.0) 


 





 


Red Blood Count


 3.64 x10^6/uL


(3.50-5.40) 


 





 


Hemoglobin


 10.5 g/dL


(12.0-15.5) 


 





 


Hematocrit


 30.3 %


(36.0-47.0) 


 





 


Mean Corpuscular Volume 83 fL ()   


 


Mean Corpuscular Hemoglobin 29 pg (25-35)   


 


Mean Corpuscular Hemoglobin


Concent 35 g/dL


(31-37) 


 





 


Red Cell Distribution Width


 13.2 %


(11.5-14.5) 


 





 


Platelet Count


 439 x10^3/uL


(140-400) 


 





 


Neutrophils (%) (Auto) 77 % (31-73)   


 


Lymphocytes (%) (Auto) 13 % (24-48)   


 


Monocytes (%) (Auto) 9 % (0-9)   


 


Eosinophils (%) (Auto) 1 % (0-3)   


 


Basophils (%) (Auto) 1 % (0-3)   


 


Neutrophils # (Auto)


 7.2 x10^3/uL


(1.8-7.7) 


 





 


Lymphocytes # (Auto)


 1.2 x10^3/uL


(1.0-4.8) 


 





 


Monocytes # (Auto)


 0.8 x10^3/uL


(0.0-1.1) 


 





 


Eosinophils # (Auto)


 0.1 x10^3/uL


(0.0-0.7) 


 





 


Basophils # (Auto)


 0.0 x10^3/uL


(0.0-0.2) 


 





 


Sodium Level


 134 mmol/L


(136-145) 


 





 


Potassium Level


 3.9 mmol/L


(3.5-5.1) 


 





 


Chloride Level


 97 mmol/L


() 


 





 


Carbon Dioxide Level


 29 mmol/L


(21-32) 


 





 


Anion Gap 8 (6-14)   


 


Blood Urea Nitrogen


 22 mg/dL


(7-20) 


 





 


Creatinine


 0.8 mg/dL


(0.6-1.0) 


 





 


Estimated GFR


(Cockcroft-Gault) 67.7 


 


 





 


BUN/Creatinine Ratio 28 (6-20)   


 


Glucose Level


 55 mg/dL


(70-99) 


 





 


Calcium Level


 9.0 mg/dL


(8.5-10.1) 


 





 


Total Bilirubin


 0.5 mg/dL


(0.2-1.0) 


 





 


Aspartate Amino Transf


(AST/SGOT) 18 U/L (15-37) 


 


 





 


Alanine Aminotransferase


(ALT/SGPT) 20 U/L (14-59) 


 


 





 


Alkaline Phosphatase


 65 U/L


() 


 





 


Ammonia


 < 10 mcmol/L


(11-34) 


 





 


Total Protein


 7.2 g/dL


(6.4-8.2) 


 





 


Albumin


 3.6 g/dL


(3.4-5.0) 


 





 


Albumin/Globulin Ratio 1.0 (1.0-1.7)   


 


Urine Collection Type  U cath  


 


Urine Color  Yellow  


 


Urine Clarity  Clear  


 


Urine pH  6.5  


 


Urine Specific Gravity  1.010  


 


Urine Protein


 


 100 mg/dL


(NEG-TRACE) 





 


Urine Glucose (UA)


 


 Negative mg/dL


(NEG) 





 


Urine Ketones (Stick)


 


 Negative mg/dL


(NEG) 





 


Urine Blood  Negative (NEG)  


 


Urine Nitrite  Negative (NEG)  


 


Urine Bilirubin  Negative (NEG)  


 


Urine Urobilinogen Dipstick


 


 1.0 mg/dL (0.2


mg/dL) 





 


Urine Leukocyte Esterase  Negative (NEG)  


 


Urine RBC  1-2 /HPF (0-2)  


 


Urine WBC  1-4 /HPF (0-4)  


 


Urine Squamous Epithelial


Cells 


 Mod /LPF 


 





 


Urine Bacteria


 


 Few /HPF


(0-FEW) 





 


Urine Hyaline Casts


 


 Occasional


/HPF 





 


Glucose (Fingerstick)


 


 


 92 mg/dL


(70-99)











VTE Prophylaxis Ordered


VTE Prophylaxis Devices:  Yes


VTE Pharmacological Prophylaxi:  Yes





Assessment/Plan


Assessment/Plan


TRAnsient confusion - added neuro, esr, b12, tsh check etc


reported syncope, - 


NO evidence acute CVA or infection


HTN, accelerated pOA - i resumed home bp meds and added prn cloniidine


DM 2 on OHA plus insulin - resumed


Depression nos on meds


OA- chronic stable


FULL CODE





PLAN:


2 mN,  non tele bed


Add pt ot


COntrol BP


CT cervical spine 


check echo re "syncope"


I have resumed meds


FUll code


further recs pending above











SHANE BOOTHE MD            Nov 3, 2019 10:35

## 2019-11-04 VITALS — SYSTOLIC BLOOD PRESSURE: 149 MMHG | DIASTOLIC BLOOD PRESSURE: 62 MMHG

## 2019-11-04 VITALS — SYSTOLIC BLOOD PRESSURE: 142 MMHG | DIASTOLIC BLOOD PRESSURE: 53 MMHG

## 2019-11-04 VITALS — SYSTOLIC BLOOD PRESSURE: 156 MMHG | DIASTOLIC BLOOD PRESSURE: 64 MMHG

## 2019-11-04 VITALS — SYSTOLIC BLOOD PRESSURE: 148 MMHG | DIASTOLIC BLOOD PRESSURE: 85 MMHG

## 2019-11-04 VITALS — SYSTOLIC BLOOD PRESSURE: 149 MMHG | DIASTOLIC BLOOD PRESSURE: 71 MMHG

## 2019-11-04 VITALS — DIASTOLIC BLOOD PRESSURE: 62 MMHG | SYSTOLIC BLOOD PRESSURE: 146 MMHG

## 2019-11-04 RX ADMIN — INSULIN LISPRO SCH UNITS: 100 INJECTION, SOLUTION INTRAVENOUS; SUBCUTANEOUS at 17:40

## 2019-11-04 RX ADMIN — INSULIN LISPRO SCH UNITS: 100 INJECTION, SOLUTION INTRAVENOUS; SUBCUTANEOUS at 13:17

## 2019-11-04 RX ADMIN — CITALOPRAM HYDROBROMIDE SCH MG: 20 TABLET ORAL at 10:28

## 2019-11-04 RX ADMIN — INSULIN GLARGINE SCH UNIT: 100 INJECTION, SOLUTION SUBCUTANEOUS at 21:00

## 2019-11-04 RX ADMIN — LISINOPRIL SCH MG: 10 TABLET ORAL at 10:27

## 2019-11-04 RX ADMIN — CLOPIDOGREL BISULFATE SCH MG: 75 TABLET ORAL at 10:27

## 2019-11-04 RX ADMIN — INSULIN LISPRO SCH UNITS: 100 INJECTION, SOLUTION INTRAVENOUS; SUBCUTANEOUS at 17:39

## 2019-11-04 RX ADMIN — ATORVASTATIN CALCIUM SCH MG: 20 TABLET, FILM COATED ORAL at 21:09

## 2019-11-04 RX ADMIN — INSULIN LISPRO SCH UNITS: 100 INJECTION, SOLUTION INTRAVENOUS; SUBCUTANEOUS at 08:00

## 2019-11-04 RX ADMIN — CALCIUM SCH MG: 500 TABLET ORAL at 10:27

## 2019-11-04 RX ADMIN — VITAMIN D, TAB 1000IU (100/BT) SCH UNIT: 25 TAB at 10:27

## 2019-11-04 RX ADMIN — ASPIRIN SCH MG: 81 TABLET, COATED ORAL at 10:28

## 2019-11-04 NOTE — PDOC
PROGRESS NOTES


Assessment


Assessment


Metabolic encephalopathy.


Confusion.


Gait problem.


Generalized weakness.


HTN.


HLD.


DM.


CAD.


Anxiety.


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 


No evidence of acute CVA this time.





RECOMMENDATIONS/PLAN:


Continue Plavix 75 mg daily.


Continue Lipitor HS..


OT/PT.





EEG pending.





History of Present Illness


This is an 88-year-old [place] origin female patient with above medical 

diseases and stroke in 8/19. She was noted to have mental status changes, 

confusion, decreased speech and decreased activity for about 2 days to be 

brought to the ER of [facility] for further evaluation. She also was noted gait problem

s. She had stroke in 8/19 and had rehab and she covered well post stroke, but 

her mentation, speech and gait changed since the day before yesterday per her 

family.


11/4/19: MS improved.





Past Medical History


Cardiovascular:  HTN, Syncope, HLD, CAD.


Hepatobiliary:  No pertinent hx


Psych:  Anxiety, Depression


Musculoskeletal:   low back pain, Osteoarthritis


Endocrine:  Diabetes





Past Surgical History


Cholecystectomy, Cataract Removal





Family History


High Cholesterol





ALLERGY:


NKDA





MEDICATIONS:


Refer to MAR





SOCIAL HISTORY: 


Lives at home with her son and daughter-in-law.


Denies smoking, drinking, and illicit drug use.  





REVIEW OF SYSTEMS:


Constitutional: No malnutrition, weight loss, cachexia.


Head: No traumatic brain or head injury.


Skin: No edema, or rash.


Ear: No infection.


Eyes: No vision loss or color blindness.


Nose: No bleeding or purulent discharges.


Hearing: No hearing decrease.


Neck: No injury.


Breast: No history of cancer, masses,or discharges.


Cardiac: CAD, HTN, HLD.


Pulmonary: No COPD.


GI: No GI ulcer, GI bleeding.


Urinary/genital: UTI.


Endocrinologic: Diabetes Mellitus.


Skeletomuscular: Generalized weakness.


Neurological: see  HP.


Psychiatric: Denies drug use/abuse.


Otherwise, not pertinant14-point review of systems.





PHYSICAL EXAMINATION:   


General appearance is in subacute distress.  


HEENT:  Normocephalic and nontraumatic.  Eyes, nose, ears, and throat are 

unremarkable.  


Neck is supple. No lymphadenopathy. No bruits are heard over the carotid artery.

 No crepitus. 


Cardiovascular:  S1, S2, regular rate and rhythm.  


Pulmonary:  Clear to auscultation bilaterally. 


Abdomen:  Bowel sounds are positive.  Abdomen is soft, nontender, and 

nondistended.    


Extremities:  No rash, lesions, or edema.  


No restriction of range of motion





NEUROLOGICAL  EXAMINATION:


Alert 


Not oriented to time, place but knew person.


PERRL.


EOMI.


CN: no focal findings.


Muscle tone: within normal.


Muscle strength: 5-


DTR: 2


Plantar reflex: Flexor response bilaterally 


Gait: not examined in bed. 


Sensory exam: no abnormal findings.


No cerebellar signs elicited.


F-T-N test fine.





Objective


Objective





Vital Signs








  Date Time  Temp Pulse Resp B/P (MAP) Pulse Ox O2 Delivery O2 Flow Rate FiO2


 


11/4/19 11:00 98.2 88 18 142/53 (82) 97 Room Air  





 98.2       














Intake and Output 


 


 11/4/19





 07:00


 


Intake Total 430 ml


 


Output Total 450 ml


 


Balance -20 ml


 


 


 


Intake Oral 430 ml


 


Output Urine Total 450 ml


 


# Voids 7


 


# Bowel Movements 1











Vitals Signs


Vitals





                          VS - Last 72 Hours, by Label








  Date Time  Temp Pulse Resp B/P (MAP) Pulse Ox O2 Delivery O2 Flow Rate FiO2


 


11/4/19 11:00 98.2 88 18 142/53 (82) 97 Room Air  





 98.2       


 


11/4/19 10:27  84  149/62    


 


11/4/19 10:27  84  149/62    


 


11/4/19 07:10      Room Air  


 


11/4/19 07:00 98.0 84 18 149/62 (91) 97 Room Air  





 98.0       


 


11/4/19 03:00 98.0 84 18 149/71 (97) 98 Room Air  





 98.0       


 


11/3/19 23:00 98.9 88 18 144/58 (86) 97 Room Air  





 98.9       


 


11/3/19 19:35      Room Air  


 


11/3/19 19:00 98.1 93 18 156/61 (92) 97 Room Air  





 98.1       


 


11/3/19 15:00  87 18 157/58 (91) 97 Room Air  


 


11/3/19 11:00 98.6 114 20 197/70 (112) 96 Room Air  





 98.6       


 


11/3/19 09:13  92  152/51    


 


11/3/19 09:09  92  152/51    


 


11/3/19 08:00      Room Air  


 


11/3/19 07:00 97.8 92 16 152/51 (84) 98 Room Air  





 97.8       











Laboratory


Laboratory





Laboratory Tests








Test


 11/3/19


17:06 11/3/19


19:29 11/3/19


20:25 11/4/19


03:35


 


Glucose (Fingerstick)


 66 mg/dL


(70-99) 140 mg/dL


(70-99) 151 mg/dL


(70-99) 





 


Magnesium Level


 


 


 


 1.8 mg/dL


(1.8-2.4)


 


Test


 11/4/19


07:26 11/4/19


12:56 


 





 


Glucose (Fingerstick)


 120 mg/dL


(70-99) 155 mg/dL


(70-99) 


 














Medication


Medications





Current Medications


Atorvastatin Calcium (Lipitor) 20 mg QHS PO  Last administered on 11/3/19at 

21:06;  Start 11/3/19 at 21:00


Insulin Glargine (Lantus Syringe) 12 unit QHS SQ  Last administered on 11/3/19at

21:10;  Start 11/3/19 at 21:00


Insulin Human Lispro (HumaLOG) 7 units DAILYWSUP SQ ;  Start 11/3/19 at 17:00





Comment


Review of Relevant


I have reviewed the following items lorenzo (where applicable) has been applied.











AHMET POON MD                  Nov 4, 2019 15:49

## 2019-11-04 NOTE — NUR
PANCHO following for discharge planning. Chart reviewed, discussed with RN. Pt is from home with 
son. PT/OT recommending SNU. PANCHO attempted to contact pt's son, Riley ([phone]). PANCHO left 
a voicemail requesting a call back. PANCHO will continue to follow. 

-------------------------------------------------------------------------------

Addendum: 11/04/19 at 1532 by LEIGH DELEON

-------------------------------------------------------------------------------

Riley contacted PANCHO back. Family agreeable to SNU. Would like referral sent to [facility] (ph: [phone], fax: [phone]). PANCHO phoned and faxed referral. Awaiting acceptance decision.

## 2019-11-04 NOTE — RAD
MRI of the brain without contrast 11/4/2019

 

Clinical History: Mental status changes.

 

Technique: Unenhanced T1-weighted sagittal and axial, T2-weighted axial 

and coronal and FLAIR, gradient echo and diffusion-weighted axial images 

of the brain were obtained.

 

Findings: Comparison study is dated 8/20/2019.

 

Some of the images are degraded by patient motion.

 

There is generalized parenchymal atrophy. Patchy, confluent and multiple 

focal areas of increased signal intensity are seen within the 

periventricular and subcortical white matter of both cerebral hemispheres 

on the FLAIR and T2-weighted images consistent with areas of extensive 

small vessel ischemic disease. No acute parenchymal abnormality is seen. 

No extra-axial fluid collection is seen. There is no MRI evidence of acute

ischemia/infarction.

 

The paranasal sinuses are essentially clear. Normal flow voids are seen 

within the major vascular structures surrounding the brain parenchyma. 

There are small bilateral mastoid effusions, left greater than right.

 

Impression: No acute parenchymal abnormality is seen.

 

Electronically signed by: Vickey Romero MD (11/4/2019 1:07 PM) [facility]

## 2019-11-04 NOTE — CARD
MR#: [id number]

Account#: [id number]

Accession#: [id number]

Date of Study: 11/04/2019

Ordering Physician: SHANE BOOTHE, 

Referring Physician: SHANE BOOTHE 

Tech: Buffy Simmons





--------------- APPROVED REPORT --------------





EXAM: Two-dimensional and M-mode echocardiogram with Doppler and color Doppler.



Other Information 

Quality : AverageHR: 80bpm

Technically limited study due to  body habitus.



INDICATION

Syncope 



2D DIMENSIONS 

RVDd2.0 (2.9-3.5cm)Left Atrium(2D)2.6 (1.6-4.0cm)

IVSd0.9 (0.7-1.1cm)Aortic Root(2D)2.6 (2.0-3.7cm)

LVDd3.9 (3.9-5.9cm)LVOT Diameter1.9 (1.8-2.4cm)

PWd1.0 (0.7-1.1cm)LVDs2.5 (2.5-4.0cm)

FS (%) 37.1 %SV45.1 ml

LVEF(%)67.7 (>50%)



Aortic Valve

AoV Peak Femi.202.3cm/sAoV VTI43.5cm

AO Peak GR.16.4mmHgAO Mean GR.11mmHg



Mitral Valve

MV E Rbptupgd618.3cm/sMV E Peak Gr.153mmHg

MV DECEL ZFWS171waOH A Pxlnzanu181.5cm/s

MV E Mean Gr.6mmHgE/A  Ratio0.8



TDI

Lateral E' P. V7.72cm/sMedial E' P. V4.83cm/s

E/Lateral E'16.5E/Medial E'26.4



Tricuspid Valve

TR P. Gzuwlqmo0of/sRAP TTIJODQO3hnJl

TR Peak Gr.26bhMsMANU97jySf



Pulmonary Vein

S1 Qirvdyzj61.9cm/sS2 Trtqbovr25.65cm/s

D2 Dlkcrztz71.6cm/sPVa baefqgks62nfeu



 LEFT VENTRICLE 

The left ventricle is normal size. There is normal left ventricular wall thickness. The left ventricu
lar systolic function is normal and the ejection fraction is within normal range. The Ejection Fracti
on is 60-65%. There is normal LV segmental wall motion. Transmitral Doppler flow pattern is Grade I-a
bnormal relaxation pattern.



 RIGHT VENTRICLE 

The right ventricle is normal size. There is normal right ventricular wall thickness. The right ventr
icular systolic function is normal.



 ATRIA 

The left atrium size is normal. The right atrium size is normal. The interatrial septum is intact wit
h no evidence for an atrial septal defect or patent foramen ovale as noted on 2-D or Doppler imaging.




 AORTIC VALVE 

The aortic valve is calcified with restricted leaflet motion. Doppler and Color Flow revealed no sign
ificant aortic regurgitation. There is no significant aortic valvular stenosis.



 MITRAL VALVE 

Mitral annular calcification is moderate. There is no evidence of mitral valve prolapse. There is a m
jackeline pressure gradient of 6 mmHg. There is moderate mitral valve stenosis. Doppler and Color-flow reve
aled trace to mild mitral regurgitation.



 TRICUSPID VALVE 

The tricuspid valve is normal in structure and function. Doppler and Color Flow revealed trace tricus
pid regurgitation with an estimated PAP of 48 mmHg. There is no tricuspid valve prolapse or vegetatio
n. There is no tricuspid valve stenosis.



 PULMONIC VALVE 

The pulmonic valve is not well visualized. Doppler and Color Flow revealed trace to mild pulmonic stephen
vular regurgitation. There is no pulmonic valvular stenosis.



 GREAT VESSELS 

The aortic root is normal in size. The IVC is normal in size and collapses >50% with inspiration.



 PERICARDIAL EFFUSION 

There is no evidence of significant pericardial effusion.



Critical Notification

Critical Value: No



<Conclusion>

The left ventricular systolic function is normal and the ejection fraction is within normal range. Th
e Ejection Fraction is 60-65%.

There is normal LV segmental wall motion.

There is a mean pressure gradient of 6 mmHg. There is moderate mitral valve stenosis.

Doppler and Color Flow revealed trace tricuspid regurgitation with an estimated PAP of 48 mmHg.



Signed by : Fred Coronel, 

Electronically Approved : 11/04/2019 12:44:21

## 2019-11-04 NOTE — PDOC
PROGRESS NOTES


Chief Complaint


Chief Complaint


TRAnsient confusion - added neuro, esr, b12, tsh check etc


reported syncope, - 


NO evidence acute CVA or infection


HTN, accelerated pOA - i resumed home bp meds and added prn cloniidine


DM 2 on OHA plus insulin - resumed


Depression nos on meds


OA- chronic stable


HYpomagnesemia


FULL CODE





History of Present Illness


History of Present Illness


Oriented to self and place, needed coaxing re time/day of the week


[place] descent


CT cervical spine unimpressive


FOr EEG and mRI brain today per neuro. CT head ok


PT resc SNU - i consulted SW


FAmily requested some herbal mag supplements yesterday and that is fine


MAg low yesterday - i replaced IV


NOw mag is normal 1.,8





PLAN:


 SNU


MRI brain, EEG


TRaget dc to snu tmr


FULL CODE


COnt other supportive meds


Fall risk





Vitals


Vitals





Vital Signs








  Date Time  Temp Pulse Resp B/P (MAP) Pulse Ox O2 Delivery O2 Flow Rate FiO2


 


11/4/19 07:10      Room Air  


 


11/4/19 07:00 98.0 84 18 149/62 (91) 97   





 98.0       











Physical Exam


General:  Alert, Oriented X3, Cooperative, No acute distress, Other (weak 

looking but not in distress)


Lungs:  Clear


Abdomen:  Normal bowel sounds, Soft, No tenderness, No hepatosplenomegaly, No 

masses


Extremities:  No clubbing, No cyanosis, No edema, Normal pulses, No 

tenderness/swelling


Skin:  No rashes, No breakdown, No significant lesion





Labs


LABS





Laboratory Tests








Test


 11/3/19


11:01 11/3/19


15:30 11/3/19


17:06 11/3/19


19:29


 


Glucose (Fingerstick)


 264 mg/dL


(70-99) 


 66 mg/dL


(70-99) 140 mg/dL


(70-99)


 


Influenza Type A Antigen


 


 Negative


(NEGATIVE) 


 





 


Influenza Type B Antigen


 


 Negative


(NEGATIVE) 


 





 


Test


 11/3/19


20:25 11/4/19


03:35 11/4/19


07:26 





 


Glucose (Fingerstick)


 151 mg/dL


(70-99) 


 120 mg/dL


(70-99) 





 


Magnesium Level


 


 1.8 mg/dL


(1.8-2.4) 


 














Review of Systems


Review of Systems


weak, calm, all else she denies 14 pt





Assessment and Plan


Assessmemt and Plan


Problems


Medical Problems:


(1) Altered mental status


Status: Acute  





(2) Anxiety


Status: Acute  





(3) CAD (coronary artery disease)


Status: Chronic  





(4) Depression


Status: Chronic  





(5) Gait abnormality


Status: Chronic  





(6) Gait disturbance


Status: Acute  





(7) Generalized weakness


Status: Acute  





(8) HLD (hyperlipidemia)


Status: Chronic  





(9) HTN (hypertension)


Status: Chronic  





(10) Metabolic encephalopathy


Status: Chronic  





(11) Syncope


Status: Acute  





(12) T2DM (type 2 diabetes mellitus)


Status: Chronic  





(13) Transient confusion


Status: Acute  











Comment


Review of Relevant


I have reviewed the following items lorenzo (where applicable) has been applied.


Labs





Laboratory Tests








Test


 11/3/19


02:05 11/3/19


02:15 11/3/19


04:18 11/3/19


04:46


 


White Blood Count


 9.4 x10^3/uL


(4.0-11.0) 


 


 





 


Red Blood Count


 3.64 x10^6/uL


(3.50-5.40) 


 


 





 


Hemoglobin


 10.5 g/dL


(12.0-15.5) 


 


 





 


Hematocrit


 30.3 %


(36.0-47.0) 


 


 





 


Mean Corpuscular Volume 83 fL ()    


 


Mean Corpuscular Hemoglobin 29 pg (25-35)    


 


Mean Corpuscular Hemoglobin


Concent 35 g/dL


(31-37) 


 


 





 


Red Cell Distribution Width


 13.2 %


(11.5-14.5) 


 


 





 


Platelet Count


 439 x10^3/uL


(140-400) 


 


 





 


Neutrophils (%) (Auto) 77 % (31-73)    


 


Lymphocytes (%) (Auto) 13 % (24-48)    


 


Monocytes (%) (Auto) 9 % (0-9)    


 


Eosinophils (%) (Auto) 1 % (0-3)    


 


Basophils (%) (Auto) 1 % (0-3)    


 


Neutrophils # (Auto)


 7.2 x10^3/uL


(1.8-7.7) 


 


 





 


Lymphocytes # (Auto)


 1.2 x10^3/uL


(1.0-4.8) 


 


 





 


Monocytes # (Auto)


 0.8 x10^3/uL


(0.0-1.1) 


 


 





 


Eosinophils # (Auto)


 0.1 x10^3/uL


(0.0-0.7) 


 


 





 


Basophils # (Auto)


 0.0 x10^3/uL


(0.0-0.2) 


 


 





 


Sodium Level


 134 mmol/L


(136-145) 


 


 





 


Potassium Level


 3.9 mmol/L


(3.5-5.1) 


 


 





 


Chloride Level


 97 mmol/L


() 


 


 





 


Carbon Dioxide Level


 29 mmol/L


(21-32) 


 


 





 


Anion Gap 8 (6-14)    


 


Blood Urea Nitrogen


 22 mg/dL


(7-20) 


 


 





 


Creatinine


 0.8 mg/dL


(0.6-1.0) 


 


 





 


Estimated GFR


(Cockcroft-Gault) 67.7 


 


 


 





 


BUN/Creatinine Ratio 28 (6-20)    


 


Glucose Level


 55 mg/dL


(70-99) 


 


 





 


Hemoglobin A1c


 6.8 %


(4.8-5.6) 


 


 





 


Calcium Level


 9.0 mg/dL


(8.5-10.1) 


 


 





 


Total Bilirubin


 0.5 mg/dL


(0.2-1.0) 


 


 





 


Aspartate Amino Transf


(AST/SGOT) 18 U/L (15-37) 


 


 


 





 


Alanine Aminotransferase


(ALT/SGPT) 20 U/L (14-59) 


 


 


 





 


Alkaline Phosphatase


 65 U/L


() 


 


 





 


Ammonia


 < 10 mcmol/L


(11-34) 


 


 





 


Total Protein


 7.2 g/dL


(6.4-8.2) 


 


 





 


Albumin


 3.6 g/dL


(3.4-5.0) 


 


 





 


Albumin/Globulin Ratio 1.0 (1.0-1.7)    


 


Vitamin B12 Level


 267 pg/mL


(247-911) 


 


 





 


Urine Collection Type  U cath   


 


Urine Color  Yellow   


 


Urine Clarity  Clear   


 


Urine pH  6.5   


 


Urine Specific Gravity  1.010   


 


Urine Protein


 


 100 mg/dL


(NEG-TRACE) 


 





 


Urine Glucose (UA)


 


 Negative mg/dL


(NEG) 


 





 


Urine Ketones (Stick)


 


 Negative mg/dL


(NEG) 


 





 


Urine Blood  Negative (NEG)   


 


Urine Nitrite  Negative (NEG)   


 


Urine Bilirubin  Negative (NEG)   


 


Urine Urobilinogen Dipstick


 


 1.0 mg/dL (0.2


mg/dL) 


 





 


Urine Leukocyte Esterase  Negative (NEG)   


 


Urine RBC  1-2 /HPF (0-2)   


 


Urine WBC  1-4 /HPF (0-4)   


 


Urine Squamous Epithelial


Cells 


 Mod /LPF 


 


 





 


Urine Bacteria


 


 Few /HPF


(0-FEW) 


 





 


Urine Hyaline Casts


 


 Occasional


/HPF 


 





 


Glucose (Fingerstick)


 


 


 92 mg/dL


(70-99) 109 mg/dL


(70-99)


 


Test


 11/3/19


08:13 11/3/19


09:30 11/3/19


11:01 11/3/19


15:30


 


Glucose (Fingerstick)


 109 mg/dL


(70-99) 


 264 mg/dL


(70-99) 





 


Erythrocyte Sedimentation Rate  31 (0-25)   


 


Magnesium Level


 


 1.5 mg/dL


(1.8-2.4) 


 





 


Creatine Kinase


 


 79 U/L


() 


 





 


Thyroid Stimulating Hormone


(TSH) 


 0.045 uIU/mL


(0.358-3.74) 


 





 


Thyroxine (T4)


 


 10.9 ug/dL


(4.5-12.0) 


 





 


Free Triiodothyronine (T3)


pg/mL 


 2.59 pg/mL


(2.18-3.98) 


 





 


Influenza Type A Antigen


 


 


 


 Negative


(NEGATIVE)


 


Influenza Type B Antigen


 


 


 


 Negative


(NEGATIVE)


 


Test


 11/3/19


17:06 11/3/19


19:29 11/3/19


20:25 11/4/19


03:35


 


Glucose (Fingerstick)


 66 mg/dL


(70-99) 140 mg/dL


(70-99) 151 mg/dL


(70-99) 





 


Magnesium Level


 


 


 


 1.8 mg/dL


(1.8-2.4)


 


Test


 11/4/19


07:26 


 


 





 


Glucose (Fingerstick)


 120 mg/dL


(70-99) 


 


 











Laboratory Tests








Test


 11/3/19


11:01 11/3/19


15:30 11/3/19


17:06 11/3/19


19:29


 


Glucose (Fingerstick)


 264 mg/dL


(70-99) 


 66 mg/dL


(70-99) 140 mg/dL


(70-99)


 


Influenza Type A Antigen


 


 Negative


(NEGATIVE) 


 





 


Influenza Type B Antigen


 


 Negative


(NEGATIVE) 


 





 


Test


 11/3/19


20:25 11/4/19


03:35 11/4/19


07:26 





 


Glucose (Fingerstick)


 151 mg/dL


(70-99) 


 120 mg/dL


(70-99) 





 


Magnesium Level


 


 1.8 mg/dL


(1.8-2.4) 


 











Medications





Current Medications


Clonidine HCl (Catapres) 0.1 mg PRN Q1HR  PRN PO HYPERTENSION;  Start 11/3/19 at

08:15


Acetaminophen (Tylenol) 500 mg PRN Q6HRS  PRN PO MILD PAIN / TEMP Last 

administered on 11/3/19at 13:01;  Start 11/3/19 at 08:15


Acetaminophen/ Codeine Phosphate (Tylenol #3) 1 tab PRN Q6HRS  PRN PO SEVERE 

PAIN;  Start 11/3/19 at 08:15


Ondansetron HCl (Zofran) 4 mg PRN Q6HRS  PRN IVP NAUSEA/VOMITING;  Start 11/3/19

at 08:15


Calcium Carbonate/ Glycine (Tums) 500 mg PRN AFTMEALHC  PRN PO INDIGESTION;  

Start 11/3/19 at 08:15


Amlodipine Besylate (Norvasc) 5 mg DAILY PO  Last administered on 11/3/19at 

09:09;  Start 11/3/19 at 09:00


Aspirin (Ecotrin) 81 mg DAILYWBKFT PO  Last administered on 11/3/19at 09:08;  

Start 11/3/19 at 08:30


Atorvastatin Calcium (Lipitor) 20 mg QHS PO  Last administered on 11/3/19at 21

:06;  Start 11/3/19 at 21:00


Clopidogrel Bisulfate (Plavix) 75 mg DAILYWBKFT PO  Last administered on 

11/3/19at 09:08;  Start 11/3/19 at 08:30


Lisinopril (Prinivil) 10 mg DAILY PO  Last administered on 11/3/19at 09:13;  

Start 11/3/19 at 09:00


Metformin HCl (Glucophage) 500 mg BIDWMEALS PO  Last administered on 11/3/19at 

09:08;  Start 11/3/19 at 08:30


Calcium Carbonate/ Glycine (Oscal) 500 mg DAILY PO  Last administered on 

11/3/19at 09:09;  Start 11/3/19 at 09:00


Vitamin D (Vitamin D3) 1,000 unit DAILY PO  Last administered on 11/3/19at 

09:08;  Start 11/3/19 at 09:00


Citalopram Hydrobromide (CeleXA) 20 mg DAILY PO  Last administered on 11/3/19at 

09:09;  Start 11/3/19 at 09:00


Insulin Human Lispro (HumaLOG) 4 units DAILYWBKFT SQ ;  Start 11/3/19 at 08:30


Insulin Human Lispro (HumaLOG) 7 units DAILYWSUP SQ ;  Start 11/3/19 at 17:00


Insulin Glargine (Lantus Syringe) 12 unit QHS SQ  Last administered on 11/3/19at

21:10;  Start 11/3/19 at 21:00


Non-Formulary Medication (Ubidecarenone (Coq-10)) 100 mg DAILY PO ;  Start 

11/3/19 at 09:00;  Status UNV


Insulin Human Lispro (HumaLOG) 0-9 UNITS TIDWMEALS SQ  Last administered on 

11/3/19at 13:08;  Start 11/3/19 at 12:00


Dextrose (Dextrose 50%-Water Syringe) 12.5 gm PRN Q15MIN  PRN IV SEE COMMENTS;  

Start 11/3/19 at 08:15


Quinine Sulfate (Qualaquin) 324 mg PRN Q8HRS  PRN PO MUSCLE CRAMPS Last 

administered on 11/3/19at 15:25;  Start 11/3/19 at 14:00


Cyclobenzaprine HCl (Flexeril) 10 mg PRN Q6HRS  PRN PO MUSCLE SPASMS Last 

administered on 11/3/19at 13:01;  Start 11/3/19 at 12:45


Acetaminophen (Tylenol) 650 mg PRN Q6HRS  PRN PO MODERATE PAIN;  Start 11/3/19 

at 12:45


Magnesium Sulfate/ Dextrose 100 ml @  100 mls/hr 1X  ONCE IV  Last administered 

on 11/3/19at 15:04;  Start 11/3/19 at 14:30;  Stop 11/3/19 at 15:29;  Status DC





Active Scripts


Active


Magnesium Oxide 400 Mg Tablet 400 Mg PO DAILY 14 Days


Cyclobenzaprine Hcl 10 Mg Tablet 10 Mg PO PRN Q6HRS PRN


Aspirin Ec (Aspirin) 81 Mg Tablet.dr 81 Mg PO DAILYWBKFT 30 Days


Atorvastatin Calcium 20 Mg Tablet 20 Mg PO QHS 30 Days


Clopidogrel (Clopidogrel Bisulfate) 75 Mg Tablet 75 Mg PO DAILYWBKFT 30 Days


Reported


Lantus Solostar (Insulin Glargine,Hum.rec.anlog) 100 Unit/1 Ml Insuln.pen 12 

Unit SQ QHS


Novolog Flexpen (Insulin Aspart) 100 Unit/1 Ml Insuln.pen 4 Unit SQ DAILYWBKFT


Calcium (Calcium Carbonate) 600 Mg Tablet 600 Mg PO DAILY


Escitalopram Oxalate 10 Mg Tablet 1 Tab PO DAILY


Vitamin D3 (Cholecalciferol (Vitamin D3)) 1,000 Unit Capsule 1 Cap PO DAILY 30 

Days


Lisinopril 10 Mg Tablet 1 Tab PO DAILY


Metformin Hcl 500 Mg Tablet 500 Mg PO BIDWMEALS


Coq-10 (Ubidecarenone) 100 Mg Capsule 100 Mg PO DAILY


Amlodipine Besylate 5 Mg Tablet 5 Mg PO DAILY


Vitals/I & O





Vital Sign - Last 24 Hours








 11/3/19 11/3/19 11/3/19 11/3/19





 11:00 15:00 19:00 19:35


 


Temp 98.6  98.1 





 98.6  98.1 


 


Pulse 114 87 93 


 


Resp 20 18 18 


 


B/P (MAP) 197/70 (112) 157/58 (91) 156/61 (92) 


 


Pulse Ox 96 97 97 


 


O2 Delivery Room Air Room Air Room Air Room Air


 


    





    





 11/3/19 11/4/19 11/4/19 11/4/19





 23:00 03:00 07:00 07:10


 


Temp 98.9 98.0 98.0 





 98.9 98.0 98.0 


 


Pulse 88 84 84 


 


Resp 18 18 18 


 


B/P (MAP) 144/58 (86) 149/71 (97) 149/62 (91) 


 


Pulse Ox 97 98 97 


 


O2 Delivery Room Air Room Air Room Air Room Air














Intake and Output   


 


 11/3/19 11/3/19 11/4/19





 15:00 23:00 07:00


 


Intake Total 250 ml 120 ml 60 ml


 


Output Total  450 ml 


 


Balance 250 ml -330 ml 60 ml

















SHANE BOOTHE MD            Nov 4, 2019 10:31

## 2019-11-04 NOTE — SNU/HH DC
DISCHARGE ORDERS


DISCHARGE INFORMATION:


DISCHARGE DATE:  Nov 4, 2019


FINAL DIAGNOSIS


Problems


Medical Problems:


(1) Altered mental status


Status: Acute  





(2) Anxiety


Status: Acute  





(3) CAD (coronary artery disease)


Status: Chronic  





(4) Depression


Status: Chronic  





(5) Gait abnormality


Status: Chronic  





(6) Gait disturbance


Status: Acute  





(7) Generalized weakness


Status: Acute  





(8) HLD (hyperlipidemia)


Status: Chronic  





(9) HTN (hypertension)


Status: Chronic  





(10) Metabolic encephalopathy


Status: Chronic  





(11) Syncope


Status: Acute  





(12) T2DM (type 2 diabetes mellitus)


Status: Chronic  





(13) Transient confusion


Status: Acute  








CONDITION ON DISCHARGE:  Stable





CODE STATUS:


Code Status:  Full





SKILLED NURSING:


SNF STAY <30 DAYS:  Yes





HOSPICE:


HOSPICE:  No


HOSPICE EVAL & TREAT:  No





LTAC:


ADMIT TO LTAC:  No





POST DISCHARGE ORDERS:


ACTIVITY ORDERS:  Resume previous activity


DIET AFTER DISCHARGE:  Regular





CHECKS AFTER DISCHARGE:


CHECKS AFTER DISCHARGE:  Check blood press - daily





FOLLOW-UP:


PHYSICIAN FOLLOW-UP:  pcp upon snu dc for regular check up





TREATMENT/EQUIPMENT ORDERS:


ADAPTIVE EQUIPMENT NEEDED:  Front wheeled walker


Physical Therapy For:  Evalulation/Treatment


Occupational Therapy For:  Evaluation/Treatment


Speech Language Pathology For:  Evaluation/Treatment





DISCHARGE MEDICATIONS:


Home Meds


Active Scripts


Magnesium Oxide (MAGNESIUM OXIDE) 400 Mg Tablet, 400 MG PO DAILY for low mag for

14 Days, #14 TAB


   Prov:SHANE BOOTHE MD         11/4/19


Cyclobenzaprine Hcl (CYCLOBENZAPRINE HCL) 10 Mg Tablet, 10 MG PO PRN Q6HRS PRN 

for MUSCLE SPASMS, #30 TAB


   Prov:SHANE BOOTHE MD         11/4/19


Aspirin (ASPIRIN EC) 81 Mg Tablet.dr, 81 MG PO DAILYWBKFT for cva for 30 Days, 

#30 TAB.SR


   Prov:SOFIA PALU MD         8/22/19


Atorvastatin Calcium (ATORVASTATIN CALCIUM) 20 Mg Tablet, 20 MG PO QHS for 

cholesterol for 30 Days, #30 TAB


   Prov:SOFIA PAUL MD         8/22/19


Clopidogrel Bisulfate (CLOPIDOGREL) 75 Mg Tablet, 75 MG PO DAILYWBKFT for cva 

for 30 Days, #30 TAB


   Prov:SOFIA PAUL MD         8/22/19


Reported Medications


Insulin Glargine,Hum.rec.anlog (LANTUS SOLOSTAR) 100 Unit/1 Ml Insuln.pen, 12 

UNIT SQ QHS for antidiabetic, #15 ML 3 Refills


   11/3/19


Insulin Aspart (NOVOLOG FLEXPEN) 100 Unit/1 Ml Insuln.pen, 7 UNIT SQ DAILYWSUP 

for antidiabetic, SYR


   11/3/19


Insulin Aspart (NOVOLOG FLEXPEN) 100 Unit/1 Ml Insuln.pen, 4 UNIT SQ DAILYWBKFT 

for antidiabetic, SYR


   11/3/19


Calcium Carbonate (CALCIUM) 600 Mg Tablet, 600 MG PO DAILY for supplement, TAB


   11/3/19


Escitalopram Oxalate (ESCITALOPRAM OXALATE) 10 Mg Tablet, 1 TAB PO DAILY for 

depression, #30 TAB 3 Refills


   11/3/19


Cholecalciferol (Vitamin D3) (VITAMIN D3) 1,000 Unit Capsule, 1 CAP PO DAILY for

SUPPLEMENT for 30 Days, #30 CAP 0 Refills


   11/3/19


Lisinopril (LISINOPRIL) 10 Mg Tablet, 1 TAB PO DAILY for HTN, #30 TAB 5 Refills


   11/3/19


Metformin Hcl (METFORMIN HCL) 500 Mg Tablet, 500 MG PO BIDWMEALS for ANTI-DIABE

TIC, TAB 0 Refills


   11/3/19


Ubidecarenone (COQ-10) 100 Mg Capsule, 100 MG PO DAILY for  , CAP


   8/19/19


Amlodipine Besylate (AMLODIPINE BESYLATE) 5 Mg Tablet, 5 MG PO DAILY for  , TAB


   8/19/19


Discontinued Reported Medications


Insulin Aspart (NOVOLOG FLEXPEN) 100 Unit/1 Ml Insuln.pen, 10 UNIT SQ DAILYWSUP 

for  , SYR


   8/19/19


Insulin Aspart (NOVOLOG FLEXPEN) 100 Unit/1 Ml Insuln.pen, 5 UNIT SQ DAILYWBKFT 

for  , SYR


   8/19/19


Vitamin E Acid Succinate (VITAMIN E) 100 Unit Tablet, 100 UNIT PO DAILY for  , 

TAB


   8/19/19


Insulin Glargine,Hum.rec.anlog (LANTUS SOLOSTAR) 100 Unit/1 Ml Insuln.pen, 17 

UNIT SQ QHS for  , #15 ML 3 Refills


   8/19/19


Calcium Carbonate/Vitamin D3 (CALTRATE 600 + D SOFT CHEW TAB) 1 Each Tab.chew, 1

EACH PO DAILY for  , TAB.CHEW


   8/19/19


Multivitamins-Min/Fa/Ginkgo (ONE DAILY FOR WOMEN 50+ ADV TB) 1 Each Tablet, 1 

EACH PO DAILY for  , TAB


   8/19/19


Acetaminophen (TYLENOL) 325 Mg Tablet, 325 MG PO DAILY for  , TAB


   8/19/19


Lisinopril (ZESTRIL) 10 Mg Tablet, 10 MG PO BID for FOR HYPERTENSION, #30 TAB 0 

Refills


   8/19/19











SHANE BOOTHE MD            Nov 4, 2019 08:15

## 2019-11-04 NOTE — EEG
DATE OF SERVICE:  11/04/2019



EEG NUMBER:  [id number].



OBJECTIVE:  This is an 88-year-old [place] origin female patient with symptoms

of mental status changes as confusion and speech problem.  EEG was requested

to evaluate cerebral activity.



METHODS:  Twenty electrodes were applied according to the international 10-20

electrode placement system.  EKG monitoring, hyperventilation, intermittent

photic stimulation, monopolar and bipolar montages are routinely utilized.  The

record was obtained on a digital system with video monitoring.



FINDINGS:

1.  Background:  The patient was recorded in the awake and drowsy states.  No

actual sleep state was recorded.  The overall background amplitude is 10-25

microvolts.  A posterior dominant rhythm of 8 Hz is observed.



2.  Abnormalities:  No specific epileptiform discharge or electrographic seizure

is seen.  No focal or diffuse slowing.



3.  Activation:  Hyperventilation was performed with good efforts and normal

response.  Intermittent photic stimulation was performed with photic driving.



IMPRESSION:  This EEG is a normal study for the awake and drowsy states.  No

sleep state was recorded.  No focal, lateralizing, specific epileptiform

discharge or electrographic seizure is seen.

 



______________________________

AHMET POON MD



DR:  JANETTE/topher  JOB#:  [id number] / [id number]

DD:  11/04/2019 18:22  DT:  11/04/2019 19:53

SHAZIA

## 2019-11-05 VITALS — SYSTOLIC BLOOD PRESSURE: 145 MMHG | DIASTOLIC BLOOD PRESSURE: 59 MMHG

## 2019-11-05 VITALS — SYSTOLIC BLOOD PRESSURE: 168 MMHG | DIASTOLIC BLOOD PRESSURE: 72 MMHG

## 2019-11-05 VITALS — DIASTOLIC BLOOD PRESSURE: 78 MMHG | SYSTOLIC BLOOD PRESSURE: 141 MMHG

## 2019-11-05 VITALS — DIASTOLIC BLOOD PRESSURE: 57 MMHG | SYSTOLIC BLOOD PRESSURE: 138 MMHG

## 2019-11-05 VITALS — SYSTOLIC BLOOD PRESSURE: 149 MMHG | DIASTOLIC BLOOD PRESSURE: 60 MMHG

## 2019-11-05 VITALS — DIASTOLIC BLOOD PRESSURE: 56 MMHG | SYSTOLIC BLOOD PRESSURE: 157 MMHG

## 2019-11-05 RX ADMIN — CALCIUM SCH MG: 500 TABLET ORAL at 09:06

## 2019-11-05 RX ADMIN — ASPIRIN SCH MG: 81 TABLET, COATED ORAL at 09:08

## 2019-11-05 RX ADMIN — CITALOPRAM HYDROBROMIDE SCH MG: 20 TABLET ORAL at 09:06

## 2019-11-05 RX ADMIN — INSULIN LISPRO SCH UNITS: 100 INJECTION, SOLUTION INTRAVENOUS; SUBCUTANEOUS at 12:11

## 2019-11-05 RX ADMIN — INSULIN GLARGINE SCH UNIT: 100 INJECTION, SOLUTION SUBCUTANEOUS at 20:54

## 2019-11-05 RX ADMIN — LISINOPRIL SCH MG: 10 TABLET ORAL at 09:07

## 2019-11-05 RX ADMIN — ATORVASTATIN CALCIUM SCH MG: 20 TABLET, FILM COATED ORAL at 20:50

## 2019-11-05 RX ADMIN — VITAMIN D, TAB 1000IU (100/BT) SCH UNIT: 25 TAB at 09:07

## 2019-11-05 RX ADMIN — INSULIN LISPRO SCH UNITS: 100 INJECTION, SOLUTION INTRAVENOUS; SUBCUTANEOUS at 08:00

## 2019-11-05 RX ADMIN — CLOPIDOGREL BISULFATE SCH MG: 75 TABLET ORAL at 09:06

## 2019-11-05 RX ADMIN — INSULIN LISPRO SCH UNITS: 100 INJECTION, SOLUTION INTRAVENOUS; SUBCUTANEOUS at 17:00

## 2019-11-05 RX ADMIN — INSULIN LISPRO SCH UNITS: 100 INJECTION, SOLUTION INTRAVENOUS; SUBCUTANEOUS at 09:16

## 2019-11-05 NOTE — PDOC3
Discharge Summary


Visit Information


Date of Admission:  Nov 3, 2019


Date of Discharge:  Nov 5, 2019


Admitting Diagnosis Comment:


TRAnsient confusion - neuro and infectious work up neg


reported syncope, - normal echo


NO evidence acute CVA or infection


HTN, accelerated pOA - i resumed home bp meds and added prn cloniidine


DM 2 on OHA plus insulin - resumed


Depression nos on meds


OA- chronic stable


HYpomagnesemia


FULL CODE


Mild pulm htn - PA pressures 41


Final Diagnosis


Problems


Medical Problems:


(1) Altered mental status


Status: Acute  





(2) Anxiety


Status: Acute  





(3) CAD (coronary artery disease)


Status: Chronic  





(4) Depression


Status: Chronic  





(5) Gait abnormality


Status: Chronic  





(6) Gait disturbance


Status: Acute  





(7) Generalized weakness


Status: Acute  





(8) HLD (hyperlipidemia)


Status: Chronic  





(9) HTN (hypertension)


Status: Chronic  





(10) Metabolic encephalopathy


Status: Chronic  





(11) Syncope


Status: Acute  





(12) T2DM (type 2 diabetes mellitus)


Status: Chronic  





(13) Transient confusion


Status: Acute  











Brief Hospital Course


Allergies





                                    Allergies








Coded Allergies Type Severity Reaction Last Updated Verified


 


  No Known Drug Allergies    10/8/17 No








Vital Signs





Vital Signs








  Date Time  Temp Pulse Resp B/P (MAP) Pulse Ox O2 Delivery O2 Flow Rate FiO2


 


11/5/19 09:07  74  149/60    


 


11/5/19 07:00 97.8  16  96 Room Air  





 97.8       








Lab Results





Laboratory Tests








Test


 11/3/19


11:01 11/3/19


15:30 11/3/19


17:06 11/3/19


19:29


 


Glucose (Fingerstick)


 264 mg/dL


(70-99) 


 66 mg/dL


(70-99) 140 mg/dL


(70-99)


 


Influenza Type A Antigen


 


 Negative


(NEGATIVE) 


 





 


Influenza Type B Antigen


 


 Negative


(NEGATIVE) 


 





 


Test


 11/3/19


20:25 11/4/19


03:35 11/4/19


07:26 11/4/19


12:56


 


Glucose (Fingerstick)


 151 mg/dL


(70-99) 


 120 mg/dL


(70-99) 155 mg/dL


(70-99)


 


Magnesium Level


 


 1.8 mg/dL


(1.8-2.4) 


 





 


Test


 11/4/19


17:20 11/4/19


20:35 11/5/19


07:38 





 


Glucose (Fingerstick)


 175 mg/dL


(70-99) 108 mg/dL


(70-99) 129 mg/dL


(70-99) 











Laboratory Tests








Test


 11/4/19


12:56 11/4/19


17:20 11/4/19


20:35 11/5/19


07:38


 


Glucose (Fingerstick)


 155 mg/dL


(70-99) 175 mg/dL


(70-99) 108 mg/dL


(70-99) 129 mg/dL


(70-99)








Brief Hospital Course


Ms. Patton  is a 88 old [place] descent lives at home with family and had what

sounds like unprovoked syncope. SH eis not the best historian, echo, infectious 

and CT imaging MRI brain normal, SOme mild hypomagnesemia., BAck to baseline per

family maybe on the next hospital stay,. Agreebale to SNU so pplace, FUll code


Seen and examined


MAR on chart





consults: neuro


Proc; none echo, CT brain, mri brain





Discharge Information


Condition at Discharge:  Improved, Stable


Disposition/Orders:  Other (snu)


Scheduled


Amlodipine Besylate (Amlodipine Besylate) 5 Mg Tablet, 5 MG PO DAILY for  , 

(Reported)


   Entered as Reported by: ЮЛИЯ KING RN on 8/19/19 0413


   Last Action: Continued on 11/3/19 0803 by SHANE BOOTHE


Aspirin (Aspirin Ec) 81 Mg Tablet.dr 81 MG PO DAILYWBKFT for cva for 30 Days, 

#30


   Prescribed by: SOFIA PAUL MD on 8/22/19 1544


   Last Action: Continued on 11/3/19 0803 by SHANE BOOTHE


Atorvastatin Calcium (Atorvastatin Calcium) 20 Mg Tablet, 20 MG PO QHS for 

cholesterol for 30 Days, #30


   Prescribed by: SOFIA PAUL MD on 8/22/19 1544


   Last Action: Continued on 11/3/19 0803 by SHANE BOOTHE


Calcium Carbonate (Calcium) 600 Mg Tablet, 600 MG PO DAILY for supplement, 

(Reported)


   Entered as Reported by: AUSTIN MYERS on 11/3/19 0716


   Last Action: Converted on 11/3/19 0803 by SHANE BOOTHE


Cholecalciferol (Vitamin D3) (Vitamin D3) 1,000 Unit Capsule, 1 CAP PO DAILY for

SUPPLEMENT for 30 Days, #30 Ref 0 (Reported)


   Entered as Reported by: AUSTIN MYERS on 11/3/19 0716


   Last Action: Converted on 11/3/19 0803 by SHANE BOOTHE


Clopidogrel Bisulfate (Clopidogrel) 75 Mg Tablet, 75 MG PO DAILYWBKFT for cva 

for 30 Days, #30


   Prescribed by: SOFIA PAUL MD on 8/22/19 1544


   Last Action: Continued on 11/3/19 0803 by SHANE BOOTHE


Escitalopram Oxalate (Escitalopram Oxalate) 10 Mg Tablet, 1 TAB PO DAILY for 

depression, #30 Ref 3 (Reported)


   Entered as Reported by: AUSTIN MYERS on 11/3/19 0716


   Last Action: Converted on 11/3/19 0803 by SHANE BOOTHE


Insulin Aspart (Novolog Flexpen) 100 Unit/1 Ml Insuln.pen, 4 UNIT SQ DAILYWBKFT 

for antidiabetic, (Reported)


   Entered as Reported by: AUSTIN MYERS on 11/3/19 0716


   Last Action: Converted on 11/3/19 0803 by SHANE BOOTHE


Insulin Aspart (Novolog Flexpen) 100 Unit/1 Ml Insuln.pen, 7 UNIT SQ DAILYWSUP 

for antidiabetic, (Reported)


   Entered as Reported by: AUSTIN MYERS on 11/3/19 0716


   Last Action: Converted on 11/3/19 0803 by SHANE BOOTHE


Insulin Glargine,Hum.rec.anlog (Lantus Solostar) 100 Unit/1 Ml Insuln.pen, 12 

UNIT SQ QHS for antidiabetic, #15 Ref 3 (Reported)


   Entered as Reported by: AUSTIN MYERS on 11/3/19 0716


   Last Action: Converted on 11/3/19 0803 by SHANE BOOTHE


Lisinopril (Lisinopril) 10 Mg Tablet, 1 TAB PO DAILY for HTN, #30 Ref 5 

(Reported)


   Entered as Reported by: AUSTIN MYERS on 11/3/19 0716


   Last Action: Continued on 11/3/19 0803 by SHANE BOOTHE


Magnesium Oxide (Magnesium Oxide) 400 Mg Tablet, 400 MG PO DAILY for low mag for

14 Days, #14


   Prescribed by: SHANE BOOTHE on 11/4/19 0814


Metformin Hcl (Metformin Hcl) 500 Mg Tablet, 500 MG PO BIDWMEALS for ANTI-

DIABETIC, Ref 0 (Reported)


   Entered as Reported by: AUSTIN MYERS on 11/3/19 0716


   Last Action: Continued on 11/3/19 0803 by SHANE BOOTHE


Ubidecarenone (Coq-10) 100 Mg Capsule, 100 MG PO DAILY for  , (Reported)


   Entered as Reported by: ЮЛИЯ KING RN on 8/19/19 0419


   Last Action: Converted on 11/3/19 0803 by SHANE BOOTHE





Scheduled PRN


Cyclobenzaprine Hcl (Cyclobenzaprine Hcl) 10 Mg Tablet, 10 MG PO PRN Q6HRS PRN 

for MUSCLE SPASMS, #30


   Prescribed by: SHANE BOOTHE on 11/4/19 0814





Discontinued Medications


Acetaminophen (Tylenol) 325 Mg Tablet, 325 MG PO DAILY for  , (Reported)


   Entered as Reported by: ЮЛИЯ KING RN on 8/19/19 0419


   Last Action: Discontinued on 11/3/19 0730 by Kayleigh Gonzalez


Calcium Carbonate/Vitamin D3 (Caltrate 600 + D Soft Chew Tab) 1 Each Tab.chew, 1

EACH PO DAILY for  , (Reported)


   Entered as Reported by: ЮЛИЯ KING RN on 8/19/19 0419


   Last Action: Discontinued on 11/3/19 0730 by Kayleigh Gonzalez


Insulin Aspart (Novolog Flexpen) 100 Unit/1 Ml Insuln.pen, 5 UNIT SQ DAILYWBKFT 

for  , (Reported)


   Entered as Reported by: ЮЛИЯ KING RN on 8/19/19 0420


   Last Action: Discontinued on 11/3/19 0730 by Kayleigh Gonzalez


Insulin Aspart (Novolog Flexpen) 100 Unit/1 Ml Insuln.pen, 10 UNIT SQ DAILYWSUP 

for  , (Reported)


   Entered as Reported by: ЮЛИЯ KING RN on 8/19/19 0420


   Last Action: Discontinued on 11/3/19 0730 by Kayleigh Gonzalez


Insulin Glargine,Hum.rec.anlog (Lantus Solostar) 100 Unit/1 Ml Insuln.pen, 17 

UNIT SQ QHS for  , #15 Ref 3 (Reported)


   Entered as Reported by: ЮЛИЯ KING RN on 8/19/19 0419


   Last Action: Discontinued on 11/3/19 0730 by Kayleigh Gonzalez


Lisinopril (Zestril) 10 Mg Tablet, 10 MG PO BID for FOR HYPERTENSION, #30 Ref 0 

(Reported)


   Entered as Reported by: ЮЛИЯ KING RN on 8/19/19 0413


   Last Action: Discontinued on 11/3/19 0730 by Kayleigh Gonzalez


Multivitamins-Min/Fa/Ginkgo (One Daily For Women 50+ Adv Tb) 1 Each Tablet, 1 

EACH PO DAILY for  , (Reported)


   Entered as Reported by: ЮЛИЯ KING RN on 8/19/19 0419


   Last Action: Discontinued on 11/3/19 0730 by Kayleigh Gonzalez


Vitamin E Acid Succinate (Vitamin E) 100 Unit Tablet, 100 UNIT PO DAILY for  , 

(Reported)


   Entered as Reported by: ЮЛИЯ KING RN on 8/19/19 0419


   Last Action: Discontinued on 11/3/19 0730 by SHANE Mccall MD            Nov 5, 2019 10:51

## 2019-11-05 NOTE — PDOC
PROGRESS NOTES


Assessment


Assessment


Metabolic encephalopathy.


Confusion.


Gait problem.


Generalized weakness.


HTN.


HLD.


DM.


CAD.


Anxiety.


Thyroid nodule.


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 


No evidence of acute CVA this time.





RECOMMENDATIONS/PLAN:


Continue Plavix 75 mg daily.


Continue Lipitor HS.


Thyroid nodule further evaluation per floor medical team and PCP.





EEG pending.


MRI: No acute CVA.





History of Present Illness


This is an 88-year-old [place] origin female patient with above medical disease

s and stroke in 8/19. She was noted to have mental status changes, confusion, 

decreased speech and decreased activity for about 2 days to be brought to the ER

of [facility] for further evaluation. She also was noted gait problems. She had stroke 

in 8/19 and had rehab and she covered well post stroke, but her mentation, 

speech and gait changed since the day before yesterday per her family.


11/5/19: MS further improved.





Past Medical History


Cardiovascular:  HTN, Syncope, HLD, CAD.


Hepatobiliary:  No pertinent hx


Psych:  Anxiety, Depression


Musculoskeletal:   low back pain, Osteoarthritis


Endocrine:  Diabetes





Past Surgical History


Cholecystectomy, Cataract Removal





Family History


High Cholesterol





ALLERGY:


NKDA





MEDICATIONS:


Refer to MAR





SOCIAL HISTORY: 


Lives at home with her son and daughter-in-law.


Denies smoking, drinking, and illicit drug use.  





REVIEW OF SYSTEMS:


Constitutional: No malnutrition, weight loss, cachexia.


Head: No traumatic brain or head injury.


Skin: No edema, or rash.


Ear: No infection.


Eyes: No vision loss or color blindness.


Nose: No bleeding or purulent discharges.


Hearing: No hearing decrease.


Neck: No injury.


Breast: No history of cancer, masses,or discharges.


Cardiac: CAD, HTN, HLD.


Pulmonary: No COPD.


GI: No GI ulcer, GI bleeding.


Urinary/genital: UTI.


Endocrinologic: Diabetes Mellitus.


Skeletomuscular: Generalized weakness.


Neurological: see  HP.


Psychiatric: Denies drug use/abuse.


Otherwise, not pertinant14-point review of systems.





PHYSICAL EXAMINATION:   


General appearance is in no acute distress.  


HEENT:  Normocephalic and nontraumatic.  Eyes, nose, ears, and throat are 

unremarkable.  


Neck is supple. No lymphadenopathy. No bruits are heard over the carotid artery.

 No crepitus. 


Cardiovascular:  S1, S2, regular rate and rhythm.  


Pulmonary:  Clear to auscultation bilaterally. 


Abdomen:  Bowel sounds are positive.  Abdomen is soft, nontender, and 

nondistended.    


Extremities:  No rash, lesions, or edema.  


No restriction of range of motion





NEUROLOGICAL  EXAMINATION:


Alert 


Not fully oriented to time, but knew place and person.


PERRL.


EOMI.


CN: no focal findings.


Muscle tone: within normal.


Muscle strength: 5-


DTR: 2


Plantar reflex: Flexor response bilaterally 


Gait: not examined in bed. 


Sensory exam: no abnormal findings.


No cerebellar signs elicited.


F-T-N test fine.





Objective


Objective





Vital Signs








  Date Time  Temp Pulse Resp B/P (MAP) Pulse Ox O2 Delivery O2 Flow Rate FiO2


 


11/5/19 15:00 98.7 84 18 141/78 (99) 97 Room Air  





 98.7       














Intake and Output 


 


 11/5/19





 07:00


 


Intake Total 600 ml


 


Output Total 200 ml


 


Balance 400 ml


 


 


 


Intake Oral 600 ml


 


Stool Total 200 ml


 


# Voids 4


 


# Bowel Movements 1











Vitals Signs


Vitals





                          VS - Last 72 Hours, by Label








  Date Time  Temp Pulse Resp B/P (MAP) Pulse Ox O2 Delivery O2 Flow Rate FiO2


 


11/5/19 15:00 98.7 84 18 141/78 (99) 97 Room Air  





 98.7       


 


11/5/19 11:00 98.1 100 18 157/56 (89) 96 Room Air  





 98.1       


 


11/5/19 09:07  74  149/60    


 


11/5/19 09:06  74  149/60    


 


11/5/19 09:00      Room Air  


 


11/5/19 07:00 97.8 74 16 149/60 (89) 96 Room Air  





 97.8       


 


11/5/19 03:00 98.4 76 16 168/72 (104) 97 Room Air  





 98.4       


 


11/4/19 23:00 97.8 81 18 156/64 (94) 98 Room Air  





 97.8       


 


11/4/19 20:00      Room Air  


 


11/4/19 19:00 98.4 85 16 148/85 (106) 97   





 98.4       


 


11/4/19 15:00 99.0 87 18 146/62 (90) 95 Room Air  





 99.0       


 


11/4/19 11:00 98.2 88 18 142/53 (82) 97 Room Air  





 98.2       


 


11/4/19 10:27  84  149/62    


 


11/4/19 10:27  84  149/62    


 


11/4/19 07:10      Room Air  


 


11/4/19 07:00 98.0 84 18 149/62 (91) 97 Room Air  





 98.0       











Laboratory


Laboratory





Laboratory Tests








Test


 11/4/19


17:20 11/4/19


20:35 11/5/19


07:38 11/5/19


11:58


 


Glucose (Fingerstick)


 175 mg/dL


(70-99) 108 mg/dL


(70-99) 129 mg/dL


(70-99) 186 mg/dL


(70-99)











Comment


Review of Relevant


I have reviewed the following items lorenzo (where applicable) has been applied.











AHMET POON MD                  Nov 5, 2019 16:03

## 2019-11-05 NOTE — NUR
SW following. Discussed with RN, pt accepted at [facility]. Plan to discharge to 
[facility] tomorrow (11/06/19) after third midnight. Voicemail left for pt's son, 
Riley. SW will continue to follow.

## 2019-11-06 VITALS — SYSTOLIC BLOOD PRESSURE: 142 MMHG | DIASTOLIC BLOOD PRESSURE: 58 MMHG

## 2019-11-06 VITALS — SYSTOLIC BLOOD PRESSURE: 147 MMHG | DIASTOLIC BLOOD PRESSURE: 53 MMHG

## 2019-11-06 VITALS — SYSTOLIC BLOOD PRESSURE: 149 MMHG | DIASTOLIC BLOOD PRESSURE: 60 MMHG

## 2019-11-06 RX ADMIN — VITAMIN D, TAB 1000IU (100/BT) SCH UNIT: 25 TAB at 08:20

## 2019-11-06 RX ADMIN — ASPIRIN SCH MG: 81 TABLET, COATED ORAL at 08:18

## 2019-11-06 RX ADMIN — INSULIN LISPRO SCH UNITS: 100 INJECTION, SOLUTION INTRAVENOUS; SUBCUTANEOUS at 08:29

## 2019-11-06 RX ADMIN — INSULIN LISPRO SCH UNITS: 100 INJECTION, SOLUTION INTRAVENOUS; SUBCUTANEOUS at 08:00

## 2019-11-06 RX ADMIN — LISINOPRIL SCH MG: 10 TABLET ORAL at 08:23

## 2019-11-06 RX ADMIN — CLOPIDOGREL BISULFATE SCH MG: 75 TABLET ORAL at 08:18

## 2019-11-06 RX ADMIN — CALCIUM SCH MG: 500 TABLET ORAL at 08:25

## 2019-11-06 RX ADMIN — CITALOPRAM HYDROBROMIDE SCH MG: 20 TABLET ORAL at 08:20

## 2019-11-06 NOTE — NUR
Pt discharged to [facility]. Report given to Jon. IV removed. Belongings were packed and sent with 
transportation including walker and glasses. Assisted pt with dressing. Pt in wheelchair 
with transportation.

## 2019-11-06 NOTE — PDOC
[employer] PROGRESS NOTE


Chief Complaint


Chief Complaint


TRAnsient confusion


reported syncope


gait disturbance


NO evidence acute CVA on MRI


HTN


DM 2


Depression


OA


HYpomagnesemia


FULL CODE





History of Present Illness


History of Present Illness


11/6/19


Pt seen and examined. Sitting up in bed and in no apparent distress. Transient 

confusion improved. MRI brain showed no acute stroke. Discussed with nurse. Will

discharge to SNU.








11/4/19


Oriented to self and place, needed coaxing re time/day of the week


[place] descent


CT cervical spine unimpressive


FOr EEG and mRI brain today per neuro. CT head ok


PT resc SNU - i consulted 


FAmily requested some herbal mag supplements yesterday and that is fine


MAg low yesterday - i replaced IV


NOw mag is normal 1.,8





PLAN:


[facility]


MRI brain, EEG


TRaget dc to u tmr


FULL CODE


COnt other supportive meds


Fall risk





Vitals/I&O


Vitals/I&O:





                                   Vital Signs








  Date Time  Temp Pulse Resp B/P (MAP) Pulse Ox O2 Delivery O2 Flow Rate FiO2


 


11/6/19 10:40 97.6 77 16 142/58 (86) 98 Room Air  





 97.6       


 


11/6/19 07:00        











Physical Exam


General:  Alert, Cooperative, No acute distress


Heart:  Regular rate, Normal S1, Other (systolic murmur 2/6)


Lungs:  Clear


Abdomen:  Normal bowel sounds, Soft, No tenderness


Extremities:  No clubbing, No cyanosis, No tenderness/swelling


Skin:  No rashes, No breakdown, No significant lesion





Labs


Labs:





Laboratory Tests








Test


 11/5/19


16:53 11/5/19


20:23 11/6/19


07:20 11/6/19


11:20


 


Glucose (Fingerstick)


 108 mg/dL


(70-99) 197 mg/dL


(70-99) 131 mg/dL


(70-99) 187 mg/dL


(70-99)











Review of Systems


Review of Systems:


No headache


No chest pain





Assessment and Plan


Assessmemt and Plan


Problems


Medical Problems:


(1) Altered mental status


Status: Acute  





(2) Anxiety


Status: Acute  





(3) CAD (coronary artery disease)


Status: Chronic  





(4) Depression


Status: Chronic  





(5) Gait abnormality


Status: Chronic  





(6) Gait disturbance


Status: Acute  





(7) Generalized weakness


Status: Acute  





(8) HLD (hyperlipidemia)


Status: Chronic  





(9) HTN (hypertension)


Status: Chronic  





(10) Metabolic encephalopathy


Status: Chronic  





(11) Syncope


Status: Acute  





(12) T2DM (type 2 diabetes mellitus)


Status: Chronic  





(13) Transient confusion


Status: Acute  





Transient confusion


reported syncope


gait disturbance


NO evidence acute CVA on MRI head


HTN


DM 2


Depression


OA


HYpomagnesemia


FULL CODE





MRI brain 11/4 showed no acute stroke


AMS improved


Will discharge to SNU


home meds














Comment


Review of Relevant


I have reviewed the following items lorenzo (where applicable) has been applied.











OTIS PATE III DO            Nov 6, 2019 13:35

## 2019-11-06 NOTE — PDOC
PROGRESS NOTES


Assessment


Assessment


Metabolic encephalopathy.


Confusion.


Gait problem.


Generalized weakness.


HTN.


HLD.


DM.


CAD.


Anxiety.


Thyroid nodule.


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 


No evidence of acute CVA this time.





RECOMMENDATIONS/PLAN:


Continue Plavix 75 mg daily.


Continue Lipitor HS.


Thyroid nodule further evaluation per floor medical team and PCP.


FU with PCP.





EEG pending.


MRI: No acute CVA.





History of Present Illness


This is an 88-year-old Indian origin female patient with above medical 

diseases and stroke in 8/19. She was noted to have mental status changes, 

confusion, decreased speech and decreased activity for about 2 days to be 

brought to the ER of [facility] for further evaluation. She also was noted gait 

problems. She had stroke in 8/19 and had rehab and she covered well post stroke,

but her mentation, speech and gait changed since the day before yesterday per 

her family.


11/6/19: No complaints.





Past Medical History


Cardiovascular:  HTN, Syncope, HLD, CAD.


Hepatobiliary:  No pertinent hx


Psych:  Anxiety, Depression


Musculoskeletal:   low back pain, Osteoarthritis


Endocrine:  Diabetes





Past Surgical History


Cholecystectomy, Cataract Removal





Family History


High Cholesterol





ALLERGY:


NKDA





MEDICATIONS:


Refer to MAR





SOCIAL HISTORY: 


Lives at home with her son and daughter-in-law.


Denies smoking, drinking, and illicit drug use.  





REVIEW OF SYSTEMS:


Constitutional: No malnutrition, weight loss, cachexia.


Head: No traumatic brain or head injury.


Skin: No edema, or rash.


Ear: No infection.


Eyes: No vision loss or color blindness.


Nose: No bleeding or purulent discharges.


Hearing: No hearing decrease.


Neck: No injury.


Breast: No history of cancer, masses,or discharges.


Cardiac: CAD, HTN, HLD.


Pulmonary: No COPD.


GI: No GI ulcer, GI bleeding.


Urinary/genital: UTI.


Endocrinologic: Diabetes Mellitus.


Skeletomuscular: Generalized weakness.


Neurological: see  HP.


Psychiatric: Denies drug use/abuse.


Otherwise, not pertinant14-point review of systems.





PHYSICAL EXAMINATION:   


General appearance is in no acute distress.  


HEENT:  Normocephalic and nontraumatic.  Eyes, nose, ears, and throat are 

unremarkable.  


Neck is supple. No lymphadenopathy. No bruits are heard over the carotid artery.

 No crepitus. 


Cardiovascular:  S1, S2, regular rate and rhythm.  


Pulmonary:  Clear to auscultation bilaterally. 


Abdomen:  Bowel sounds are positive.  Abdomen is soft, nontender, and 

nondistended.    


Extremities:  No rash, lesions, or edema.  


No restriction of range of motion





NEUROLOGICAL  EXAMINATION:


Alert 


Partially oriented to time, but knew place and person.


PERRL.


EOMI.


CN: no focal findings.


Muscle tone: within normal.


Muscle strength: 5


DTR: 2


Plantar reflex: Flexor response bilaterally 


Gait: not examined in bed. 


Sensory exam: no abnormal findings.


No cerebellar signs elicited.


F-T-N test fine.





Objective


Objective





Vital Signs








  Date Time  Temp Pulse Resp B/P (MAP) Pulse Ox O2 Delivery O2 Flow Rate FiO2


 


11/6/19 10:40 97.6 77 16 142/58 (86) 98 Room Air  





 97.6       


 


11/6/19 07:00        














Intake and Output 


 


 11/6/19





 07:00


 


 


 


# Voids 3


 


# Bowel Movements 1











Vitals Signs


Vitals





                          VS - Last 72 Hours, by Label








  Date Time  Temp Pulse Resp B/P (MAP) Pulse Ox O2 Delivery O2 Flow Rate FiO2


 


11/6/19 10:40 97.6 77 16 142/58 (86) 98 Room Air  





 97.6       


 


11/6/19 10:10      Room Air  


 


11/6/19 08:23  77  147/53    


 


11/6/19 08:19  77  147/53    


 


11/6/19 07:00 97.6 77 16 147/53 (84) 97 Room Air  





 97.6       


 


11/6/19 03:04 98.9 109 18 149/60 (89) 96 Room Air  





 98.9       


 


11/5/19 23:13 98.3 81 18 138/57 (84) 97 Room Air  





 98.3       


 


11/5/19 20:00      Room Air  


 


11/5/19 19:55 98.3 75 18 145/59 (87) 97 Room Air  





 98.3       


 


11/5/19 15:00 98.7 84 18 141/78 (99) 97 Room Air  





 98.7       


 


11/5/19 11:00 98.1 100 18 157/56 (89) 96 Room Air  





 98.1       


 


11/5/19 09:07  74  149/60    


 


11/5/19 09:06  74  149/60    


 


11/5/19 09:00      Room Air  


 


11/5/19 07:00 97.8 74 16 149/60 (89) 96 Room Air  





 97.8       











Laboratory


Laboratory





Laboratory Tests








Test


 11/5/19


16:53 11/5/19


20:23 11/6/19


07:20 11/6/19


11:20


 


Glucose (Fingerstick)


 108 mg/dL


(70-99) 197 mg/dL


(70-99) 131 mg/dL


(70-99) 187 mg/dL


(70-99)











Comment


Review of Relevant


I have reviewed the following items lorenzo (where applicable) has been applied.











AHMET POON MD                  Nov 6, 2019 15:43

## 2019-11-06 NOTE — NUR
SW following. Discussed with RN, pt will discharge to [facility] at 11am today. SW 
left voicemail for pt's son, Riley. RN notified. No further SW needs.

## 2019-11-07 NOTE — DS
DATE OF DISCHARGE:  11/06/2019



ADMISSION DIAGNOSES:  Gait disturbance, mental status change, and old stroke in

August of this year.



DISCHARGE DIAGNOSES:  Resolving mental status change, resolving gait

disturbance, chronic dementia, and previous old stroke.



HOSPITAL COURSE:  The patient is a pleasant 88-year-old female who presented

with mental status change, weakness, and gait disturbance.  She had had an old

stroke back in August of this year.  We consulted Neurology, did physical

therapy and occupational therapy.  Her MRI did not show any acute events,

returned to her baseline.  We discharged to skilled nursing.



DISPOSITION:  Skilled.



ACTIVITY:  As tolerated.



DIET:  Low sodium.



MEDICATIONS:  Please see MRAD.



TOTAL TIME:  32 minutes.

 



______________________________

OTIS PATE DO



DR:  LON/topher  JOB#:  [id number] / [id number]

DD:  11/07/2019 17:39  DT:  11/07/2019 19:57

## 2019-11-30 ENCOUNTER — HOSPITAL ENCOUNTER (EMERGENCY)
Dept: HOSPITAL 61 - ER | Age: 84
LOS: 1 days | Discharge: HOME | End: 2019-12-01
Payer: MEDICARE

## 2019-11-30 VITALS — HEIGHT: 60 IN | BODY MASS INDEX: 23.36 KG/M2 | WEIGHT: 119 LBS

## 2019-11-30 DIAGNOSIS — I25.10: ICD-10-CM

## 2019-11-30 DIAGNOSIS — S00.93XA: Primary | ICD-10-CM

## 2019-11-30 DIAGNOSIS — E78.00: ICD-10-CM
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
## 2019-11-30 DIAGNOSIS — E11.9: ICD-10-CM

## 2019-11-30 DIAGNOSIS — I10: ICD-10-CM

## 2019-11-30 DIAGNOSIS — E87.1: ICD-10-CM
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
## 2019-11-30 DIAGNOSIS — Z86.73: ICD-10-CM
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
## 2019-11-30 DIAGNOSIS — N39.0: ICD-10-CM
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
## 2019-11-30 LAB
ALBUMIN SERPL-MCNC: 3 G/DL (ref 3.4–5)
ALBUMIN/GLOB SERPL: 0.7 {RATIO} (ref 1–1.7)
ALP SERPL-CCNC: 76 U/L (ref 46–116)
ALT SERPL-CCNC: 29 U/L (ref 14–59)
ANION GAP SERPL CALC-SCNC: 10 MMOL/L (ref 6–14)
APTT PPP: YELLOW S
AST SERPL-CCNC: 37 U/L (ref 15–37)
BACTERIA #/AREA URNS HPF: (no result) /HPF
BASOPHILS # BLD AUTO: 0.1 X10^3/UL (ref 0–0.2)
BASOPHILS NFR BLD: 1 % (ref 0–3)
BILIRUB SERPL-MCNC: 0.6 MG/DL (ref 0.2–1)
BILIRUB UR QL STRIP: NEGATIVE
BUN SERPL-MCNC: 33 MG/DL (ref 7–20)
BUN/CREAT SERPL: 37 (ref 6–20)
CALCIUM SERPL-MCNC: 9 MG/DL (ref 8.5–10.1)
CHLORIDE SERPL-SCNC: 97 MMOL/L (ref 98–107)
CO2 SERPL-SCNC: 27 MMOL/L (ref 21–32)
CREAT SERPL-MCNC: 0.9 MG/DL (ref 0.6–1)
EOSINOPHIL NFR BLD: 0.2 X10^3/UL (ref 0–0.7)
EOSINOPHIL NFR BLD: 2 % (ref 0–3)
ERYTHROCYTE [DISTWIDTH] IN BLOOD BY AUTOMATED COUNT: 14.4 % (ref 11.5–14.5)
FIBRINOGEN PPP-MCNC: (no result) MG/DL
GFR SERPLBLD BASED ON 1.73 SQ M-ARVRAT: 59 ML/MIN
GLOBULIN SER-MCNC: 4.5 G/DL (ref 2.2–3.8)
GLUCOSE SERPL-MCNC: 188 MG/DL (ref 70–99)
HCT VFR BLD CALC: 29.4 % (ref 36–47)
HGB BLD-MCNC: 10.1 G/DL (ref 12–15.5)
LYMPHOCYTES # BLD: 1.6 X10^3/UL (ref 1–4.8)
LYMPHOCYTES NFR BLD AUTO: 17 % (ref 24–48)
MCH RBC QN AUTO: 29 PG (ref 25–35)
MCHC RBC AUTO-ENTMCNC: 35 G/DL (ref 31–37)
MCV RBC AUTO: 85 FL (ref 79–100)
MONO #: 0.6 X10^3/UL (ref 0–1.1)
MONOCYTES NFR BLD: 6 % (ref 0–9)
NEUT #: 7.2 X10^3/UL (ref 1.8–7.7)
NEUTROPHILS NFR BLD AUTO: 75 % (ref 31–73)
NITRITE UR QL STRIP: POSITIVE
PH UR STRIP: 6.5 [PH]
PLATELET # BLD AUTO: 412 X10^3/UL (ref 140–400)
POTASSIUM SERPL-SCNC: 4.3 MMOL/L (ref 3.5–5.1)
PROT SERPL-MCNC: 7.5 G/DL (ref 6.4–8.2)
PROT UR STRIP-MCNC: NEGATIVE MG/DL
RBC # BLD AUTO: 3.47 X10^6/UL (ref 3.5–5.4)
RBC #/AREA URNS HPF: (no result) /HPF (ref 0–2)
SODIUM SERPL-SCNC: 134 MMOL/L (ref 136–145)
SQUAMOUS #/AREA URNS LPF: (no result) /LPF
UROBILINOGEN UR-MCNC: 1 MG/DL
WBC # BLD AUTO: 9.6 X10^3/UL (ref 4–11)
WBC #/AREA URNS HPF: >40 /HPF (ref 0–4)

## 2019-11-30 PROCEDURE — 72125 CT NECK SPINE W/O DYE: CPT

## 2019-11-30 PROCEDURE — 87086 URINE CULTURE/COLONY COUNT: CPT

## 2019-11-30 PROCEDURE — 80053 COMPREHEN METABOLIC PANEL: CPT

## 2019-11-30 PROCEDURE — 81001 URINALYSIS AUTO W/SCOPE: CPT

## 2019-11-30 PROCEDURE — 70450 CT HEAD/BRAIN W/O DYE: CPT

## 2019-11-30 PROCEDURE — 36415 COLL VENOUS BLD VENIPUNCTURE: CPT

## 2019-11-30 PROCEDURE — 85025 COMPLETE CBC W/AUTO DIFF WBC: CPT

## 2019-11-30 NOTE — PHYS DOC
Past Medical History


Past Medical History:  CAD, CVA, Depression, Diabetes-Type II, High Cholesterol,

Hypertension, UTI


Additional Past Medical Histor:  HEART MURMUR, CVA 


Past Surgical History:  Cholecystectomy


Alcohol Use:  None


Drug Use:  None





Adult General


Chief Complaint


Chief Complaint:  MECHANICAL FALL





HPI


HPI





89-year-old female presents to the emergency department via EMS after a fall. Un

known loss of consciousness unknown mechanism of injury. Unknown how long 

patient was on the ground. Patient is well-known to me, she is demented, 

underlying history of diabetes as well as hyponatremia. Patient has evidence of 

hematoma appreciated to her head no other appreciated injury on exam. She has no

acute complaints of headache, visual change, chest pain, shortness breath, 

nausea, vomiting, abdominal pain.





Review of Systems


Review of Systems





Constitutional: Denies fever or chills []


Eyes: Denies change in visual acuity, redness, or eye pain []


HENT: Denies nasal congestion or sore throat []


Respiratory: Denies cough or shortness of breath []


Cardiovascular: No additional information not addressed in HPI []


GI: Denies abdominal pain, nausea, vomiting, bloody stools or diarrhea []


Musculoskeletal: Denies back pain or joint pain []


Neurologic: Denies headache, focal weakness or sensory changes []





All other systems were reviewed and found to be within normal limits, except as 

documented in this note.





Allergies


Allergies





Allergies








Coded Allergies Type Severity Reaction Last Updated Verified


 


  No Known Drug Allergies    10/8/17 No











Physical Exam


Physical Exam





Constitutional: Well developed, well nourished, no acute distress, non-toxic 

appearance. []


HENT: Normocephalic, hematoma to left occiput, bilateral external ears normal, 

oropharynx moist, no oral exudates, nose normal. []


Eyes: PERRLA, EOMI, conjunctiva normal, no discharge. [] 


Neck: Normal range of motion, no tenderness, supple, no stridor. [] 


Cardiovascular:Heart rate regular rhythm, no murmur []


Lungs & Thorax:  Bilateral breath sounds clear to auscultation []


Abdomen: Bowel sounds normal, soft, no tenderness, no masses, no pulsatile 

masses. [] 


Skin: Warm, dry, no erythema, no rash. [] 


Back: No tenderness, no CVA tenderness. [] 


Extremities: No tenderness, no edema. [] 


Neurologic: Alert and oriented X 2, no focal deficits noted. []


Psychologic: Affect normal, judgement normal, mood normal. []





Current Patient Data


Vital Signs





                                   Vital Signs








  Date Time  Temp Pulse Resp B/P (MAP) Pulse Ox O2 Delivery O2 Flow Rate FiO2


 


19 22:22 98.9 87 14 124/57 (79) 92 Room Air  





 98.9       








Lab Values





                                Laboratory Tests








Test


 19


22:48 19


22:55


 


Urine Collection Type Unknown   


 


Urine Color Yellow   


 


Urine Clarity Hazy   


 


Urine pH 6.5   


 


Urine Specific Gravity 1.010   


 


Urine Protein


 Negative mg/dL


(NEG-TRACE) 





 


Urine Glucose (UA)


 Negative mg/dL


(NEG) 





 


Urine Ketones (Stick)


 Negative mg/dL


(NEG) 





 


Urine Blood


 Moderate (NEG)


 





 


Urine Nitrite


 Positive (NEG)


 





 


Urine Bilirubin


 Negative (NEG)


 





 


Urine Urobilinogen Dipstick


 1.0 mg/dL (0.2


mg/dL) 





 


Urine Leukocyte Esterase Large (NEG)   


 


Urine RBC


 1-2 /HPF (0-2)


 





 


Urine WBC


 >40 /HPF (0-4)


 





 


Urine Squamous Epithelial


Cells Few /LPF  


 





 


Urine Bacteria


 Many /HPF


(0-FEW) 





 


White Blood Count


 


 9.6 x10^3/uL


(4.0-11.0)


 


Red Blood Count


 


 3.47 x10^6/uL


(3.50-5.40)  L


 


Hemoglobin


 


 10.1 g/dL


(12.0-15.5)  L


 


Hematocrit


 


 29.4 %


(36.0-47.0)  L


 


Mean Corpuscular Volume


 


 85 fL ()





 


Mean Corpuscular Hemoglobin  29 pg (25-35)  


 


Mean Corpuscular Hemoglobin


Concent 


 35 g/dL


(31-37)


 


Red Cell Distribution Width


 


 14.4 %


(11.5-14.5)


 


Platelet Count


 


 412 x10^3/uL


(140-400)  H


 


Neutrophils (%) (Auto)  75 % (31-73)  H


 


Lymphocytes (%) (Auto)  17 % (24-48)  L


 


Monocytes (%) (Auto)  6 % (0-9)  


 


Eosinophils (%) (Auto)  2 % (0-3)  


 


Basophils (%) (Auto)  1 % (0-3)  


 


Neutrophils # (Auto)


 


 7.2 x10^3/uL


(1.8-7.7)


 


Lymphocytes # (Auto)


 


 1.6 x10^3/uL


(1.0-4.8)


 


Monocytes # (Auto)


 


 0.6 x10^3/uL


(0.0-1.1)


 


Eosinophils # (Auto)


 


 0.2 x10^3/uL


(0.0-0.7)


 


Basophils # (Auto)


 


 0.1 x10^3/uL


(0.0-0.2)


 


Sodium Level


 


 134 mmol/L


(136-145)  L


 


Potassium Level


 


 4.3 mmol/L


(3.5-5.1)


 


Chloride Level


 


 97 mmol/L


()  L


 


Carbon Dioxide Level


 


 27 mmol/L


(21-32)


 


Anion Gap  10 (6-14)  


 


Blood Urea Nitrogen


 


 33 mg/dL


(7-20)  H


 


Creatinine


 


 0.9 mg/dL


(0.6-1.0)


 


Estimated GFR


(Cockcroft-Gault) 


 59.0  





 


BUN/Creatinine Ratio  37 (6-20)  H


 


Glucose Level


 


 188 mg/dL


(70-99)  H


 


Calcium Level


 


 9.0 mg/dL


(8.5-10.1)


 


Total Bilirubin


 


 0.6 mg/dL


(0.2-1.0)


 


Aspartate Amino Transferase


(AST) 


 37 U/L (15-37)





 


Alanine Aminotransferase (ALT)


 


 29 U/L (14-59)





 


Alkaline Phosphatase


 


 76 U/L


()


 


Total Protein


 


 7.5 g/dL


(6.4-8.2)


 


Albumin


 


 3.0 g/dL


(3.4-5.0)  L


 


Albumin/Globulin Ratio


 


 0.7 (1.0-1.7)


L





                                Laboratory Tests


19 22:55








                                Laboratory Tests


19 22:55











EKG


EKG


[]





Radiology/Procedures


Radiology/Procedures


[facility]


                    [address] [place] [address]


                                 [phone]


                                        


                                 IMAGING REPORT





                                     Signed





PATIENT: OSCAR VILLAFANA   ACCOUNT: [id number]     MRN#: [id number]


: 1930           LOCATION: ER              AGE: 89


SEX: F                    EXAM DT: 19         ACCESSION#: [id number]


STATUS: REG ER            ORD. PHYSICIAN: ALLISON CHESTER MD


REASON: fall, unknown LOC, dementia


PROCEDURE: CT HEAD AND CERVICAL SPINE WO





CT head without contrast. CT cervical spine without contrast


 


PQRS statement: CT scans at this facility use dose reduction including 


either automated exposure control, iterative reconstructions, and /or 


weight based radiation dosing via mA and kV modification when appropriate 


to reduce radiation dose to as low as reasonably achievable.


 


HISTORY: Fall, syncope, dementia.


 


COMPARISON: CT head and cervical spine November 3, 2019.


 


CT head findings: Generalized brain atrophy stable. Cerebral white matter 


hypoattenuation likely changes of chronic microvascular disease. No 


intracranial hemorrhage, mass, hydrocephalus or infarction. Left parietal 


scalp subcentimeter hematoma. Orbits, mastoids and bones are unremarkable.


 


IMPRESSION: No acute intracranial CT normality. Small left parietal scalp 


hematoma. No skull fracture.


 


 


 


CT cervical spine findings: Craniocervical junction intact. Cervical 


vertebral body height and alignment intact. No fracture of the cervical 


spine. Left apical scarring and calcified granulomas with a 8mm somewhat 


spiculated solid nodule. Cervical disc osteophytes and uncovertebral spurs


with spinal canal and neural foraminal stenoses. 3 cm isodense left 


thyroid lobe nodule.


 


IMPRESSION: No acute osseous injury of the cervical spine. Cervical disc 


disease 3 cm isodense left thyroid lobe nodule. Left apical nodules 


largest is 8 mm with mild spiculation. Consider correlation with 


outpatient CT chest imaging.


 


Electronically signed by: Essence White MD (2019 10:57 PM) 


[facility]














DICTATED and SIGNED BY:     ESSENCE WHITE MD


DATE:     19 2257


[]





Course & Med Decision Making


Course & Med Decision Making


Pertinent Labs and Imaging studies reviewed. (See chart for details)





[]89-year-old female presents to the emergency department via EMS after a fall. 

Unknown loss of consciousness unknown mechanism of injury. Unknown how long 

patient was on the ground. Patient is well-known to me, she is demented, 

underlying history of diabetes as well as hyponatremia. Patient has evidence of 

hematoma appreciated to her head no other appreciated injury on exam. She has no

 acute complaints of headache, visual change, chest pain, shortness breath, 

nausea, vomiting, abdominal pain.





Nursing/Family reports patient not eating or drinking for several days


Patient is planned for Hospice next week


Labs/Imaging reviewed


No CT evidence of acute process


UAD reviewed - + UTI





Dragon Disclaimer


Dragon Disclaimer


This electronic medical record was generated, in whole or in part, using a voice

 recognition dictation system.





Departure


Departure


Impression:  


   Primary Impression:  


   Fall


   Additional Impression:  


   UTI (urinary tract infection)


Disposition:  03 TRANSFER SNF


Condition:  STABLE


Referrals:  


ROSALIA ELIZONDO MD (PCP)


Patient Instructions:  Urinary Tract Infection, Easy-to-Read





Additional Instructions:  


Recommend follow up with PCP 3 - 5 days


Return to the ER with worsening symptoms, intractable pain, fever, altered 

mental status


Tylenol/Motrin as needed for pain


Take antibiotics as directed


Scripts


Cephalexin (KEFLEX) 500 Mg Capsule


2 CAP PO Q12HR for 3 Days, #12 CAP


   Prov: ALLISON CHESTER MD         19





Problem Qualifiers








   Primary Impression:  


   Fall


   Encounter type:  initial encounter  Qualified Codes:  W19.XXXA - Unspecified 

   fall, initial encounter


   Additional Impression:  


   UTI (urinary tract infection)


   Urinary tract infection type:  site unspecified  Hematuria presence:  without

    hematuria  Qualified Codes:  N39.0 - Urinary tract infection, site not 

   specified








ALLISON CHESTER MD              2019 22:48

## 2019-11-30 NOTE — RAD
CT head without contrast. CT cervical spine without contrast

 

PQRS statement: CT scans at this facility use dose reduction including 

either automated exposure control, iterative reconstructions, and /or 

weight based radiation dosing via mA and kV modification when appropriate 

to reduce radiation dose to as low as reasonably achievable.

 

HISTORY: Fall, syncope, dementia.

 

COMPARISON: CT head and cervical spine November 3, 2019.

 

CT head findings: Generalized brain atrophy stable. Cerebral white matter 

hypoattenuation likely changes of chronic microvascular disease. No 

intracranial hemorrhage, mass, hydrocephalus or infarction. Left parietal 

scalp subcentimeter hematoma. Orbits, mastoids and bones are unremarkable.

 

IMPRESSION: No acute intracranial CT normality. Small left parietal scalp 

hematoma. No skull fracture.

 

 

 

CT cervical spine findings: Craniocervical junction intact. Cervical 

vertebral body height and alignment intact. No fracture of the cervical 

spine. Left apical scarring and calcified granulomas with a 8mm somewhat 

spiculated solid nodule. Cervical disc osteophytes and uncovertebral spurs

with spinal canal and neural foraminal stenoses. 3 cm isodense left 

thyroid lobe nodule.

 

IMPRESSION: No acute osseous injury of the cervical spine. Cervical disc 

disease 3 cm isodense left thyroid lobe nodule. Left apical nodules 

largest is 8 mm with mild spiculation. Consider correlation with 

outpatient CT chest imaging.

 

Electronically signed by: Vincent White MD (11/30/2019 10:57 PM) 

[facility]

## 2019-12-01 VITALS — DIASTOLIC BLOOD PRESSURE: 57 MMHG | SYSTOLIC BLOOD PRESSURE: 126 MMHG

## 2023-07-16 NOTE — PDOC3
Discharge Summary


Date of Admission:  Aug 23, 2019


Date of Discharge:  Aug 23, 2019


Follow-Up:  1-2 days


Admitting Diagnosis comment:





discharge dx ==============








Assessment/Plan





impression





1.  Nonobstructive coronary artery disease


2.  Hyperdynamic left ventricle systolic function with ejection fraction 

estimated at 80%


3. non-STEMI is most probably type 2/demand ischemia. 


4. 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 

white 


matter change.


5. diabetes


6. hypertension


7. dizziness


8. dyspnea


9. on echo Ejection Fraction is >65%.


10.There is mild concentric left ventricular hypertrophy.


11. mild valvular aortic stenosis. 


by echo . 


Calculated aortic valve area is 1.4 cm2 with maximum pressure gradient of 14 

mmHg and mean pressure gradient of 10 mmHg.


12.  shortness of breath //probably anginal equivalent. Chest x-ray did not show

any evidence of congestive heart failure.


13.  new onset confusion, memory loss, metabolic encephalopathy


14. component of hypertensive encephalopathy, possible concussion


15, CVA, MULTIPLE areas


focus of restricted diffusion of the anterior left 


cerebellum about 1 cm in size, corresponding T2 and FLAIR hyperintense 


signal. There is also small focus of restricted diffusion of the posterior


medial left cerebellum about 0.6 cm in size, hypointense on ADC map. There


could be a very small focus of restricted diffusion of the right occipital


lobe about 0.4 cm although limited evaluation due to artifact in this 


region. There is a small focus of restricted diffusion of the left 


temporal lobe along the periventricular white matter about 0.5 cm. There 


is a 0.4 cm focus of restricted diffusion of the posterior right centrum 


semiovale. There is probable very small focus of restricted diffusion 


right parietal cortical surface about 0.2 cm otherwise difficult to 


characterize given small size. There is small 0.3 cm focus of somewhat 


faint restricted diffusion of the right frontal lobe and also separate 


small focus of the right frontal cortical surface about 0.3 cm in size.





plan





cont  cvc 


cardiac cath  reviewed


medical management.


accuchecks


risk reduction mgt


cardiology consulted


pt/ot


neurology consult


mri head 


event monitor, possible NORBERT


Add clopidogrel, discontinue aspirin after a week


Discharge Recommendations 


* Skilled Nursing Unit











31 min pt exam, chart review d/c planning  , > 50% of time spent with exam, 

chart review, pt care coordination





Vitals


Vitals





Vital Signs








  Date Time  Temp Pulse Resp B/P (MAP) Pulse Ox O2 Delivery O2 Flow Rate FiO2


 


8/22/19 15:00 98.0 75 18 161/59 (93) 97 Room Air  





 98.0       











Physical Exam


General:  Alert, Cooperative, No acute distress


Heart:  Regular rate, Normal S1, Normal S2, Other (ESM LUPSB)


Lungs:  Clear


Abdomen:  Normal bowel sounds, Soft, No tenderness


Extremities:  No clubbing, No cyanosis


Skin:  No significant lesion


FINAL DIAGNOSIS


Problems


Medical Problems:


(1) Diabetes


Status: Chronic  





(2) Dyspnea


Status: Acute  





(3) HTN (hypertension)


Status: Chronic  





(4) Hypertensive encephalopathy


Status: Acute  





(5) Metabolic encephalopathy


Status: Acute  





(6) NSTEMI (non-ST elevated myocardial infarction)


Status: Acute  





(7) Valvular stenosis, aortic


Status: Chronic  








Brief Hospital Course


Ms. Patton  is a 88 old [sex] who presented with [acute cva ]


CONDITION AT DISCHARGE:  Improved


Discharge Medications





Current Medications


Aspirin (Ecotrin) 324 mg 1X  ONCE PO  Last administered on 8/19/19at 03:10;  

Start 8/19/19 at 03:30;  Stop 8/19/19 at 03:31;  Status DC


Ondansetron HCl (Zofran) 4 mg PRN Q8HRS  PRN IV NAUSEA/VOMITING 1ST CHOICE;  

Start 8/19/19 at 04:30;  Stop 8/20/19 at 04:29;  Status DC


Sodium Chloride 1,000 ml @  75 mls/hr [id number] IV  Last administered on 8/19/19at

04:41;  Start 8/19/19 at 05:00;  Stop 8/20/19 at 04:59;  Status DC


Lidocaine HCl (Xylocaine-Mpf 1% 2ml Vial) 2 ml STK-MED ONCE .ROUTE ;  Start 

8/19/19 at 09:30;  Stop 8/19/19 at 09:31;  Status DC


Iohexol (Omnipaque 300 Mg/ml) 100 ml STK-MED ONCE .ROUTE ;  Start 8/19/19 at 

09:30;  Stop 8/19/19 at 09:31;  Status DC


Heparin Sodium/ Sodium Chloride 500 ml @  As Directed STK-MED ONCE .ROUTE ;  

Start 8/19/19 at 09:30;  Stop 8/19/19 at 09:31;  Status DC


Fentanyl Citrate (Fentanyl 2ml Vial) 100 mcg STK-MED ONCE .ROUTE ;  Start 

8/19/19 at 09:48;  Stop 8/19/19 at 09:48;  Status DC


Midazolam HCl (Versed) 2 mg STK-MED ONCE .ROUTE ;  Start 8/19/19 at 09:48;  Stop

8/19/19 at 09:48;  Status DC


Heparin Sodium (Porcine) (Heparin Sodium) 10,000 unit STK-MED ONCE .ROUTE ;  

Start 8/19/19 at 09:48;  Stop 8/19/19 at 09:48;  Status DC


Verapamil HCl (Verapamil) 5 mg STK-MED ONCE .ROUTE ;  Start 8/19/19 at 09:48;  

Stop 8/19/19 at 09:48;  Status DC


Nitroglycerin (Nitroglycerin) 200 mcg STK-MED ONCE .ROUTE ;  Start 8/19/19 at 

09:48;  Stop 8/19/19 at 09:49;  Status DC


Iohexol (Omnipaque 300 Mg/ml) 100 ml STK-MED ONCE .ROUTE ;  Start 8/19/19 at 

10:24;  Stop 8/19/19 at 10:24;  Status DC


Nitroglycerin (Nitroglycerin) 200 mcg 1X  ONCE IART  Last administered on 

8/19/19at 10:54;  Start 8/19/19 at 10:45;  Stop 8/19/19 at 10:46;  Status DC


Verapamil HCl (Verapamil) 2.5 mg 1X  ONCE IART  Last administered on 8/19/19at 

10:54;  Start 8/19/19 at 10:45;  Stop 8/19/19 at 10:46;  Status DC


Heparin Sodium (Porcine) (Heparin Sodium) 2,500 unit 1X  ONCE IART  Last 

administered on 8/19/19at 10:54;  Start 8/19/19 at 10:45;  Stop 8/19/19 at 

10:46;  Status DC


Heparin Sodium/ Sodium Chloride (HEPARIN for ARTERIAL LINE FLUSH) 1,000 unit 1X 

ONCE IART  Last administered on 8/19/19at 10:54;  Start 8/19/19 at 10:45;  Stop 

8/19/19 at 10:46;  Status DC


Midazolam HCl (Versed) 2 mg 1X  ONCE IV  Last administered on 8/19/19at 10:54;  

Start 8/19/19 at 10:45;  Stop 8/19/19 at 10:46;  Status DC


Fentanyl Citrate (Fentanyl 2ml Vial) 100 mcg 1X  ONCE IV  Last administered on 

8/19/19at 10:54;  Start 8/19/19 at 10:45;  Stop 8/19/19 at 10:46;  Status DC


Iohexol (Omnipaque 300 Mg/ml) 100 ml 1X  ONCE IART  Last administered on 

8/19/19at 10:54;  Start 8/19/19 at 10:45;  Stop 8/19/19 at 10:46;  Status DC


Lidocaine HCl (Xylocaine-Mpf 1% 2ml Vial) 2 ml 1X  ONCE INJ  Last administered 

on 8/19/19at 10:54;  Start 8/19/19 at 10:45;  Stop 8/19/19 at 10:46;  Status DC


Info (CONTRAST GIVEN -- Rx MONITORING) 1 each PRN DAILY  PRN MC SEE COMMENTS;  

Start 8/19/19 at 11:00;  Stop 8/21/19 at 10:59;  Status DC


Sodium Chloride 1,000 ml @  100 mls/hr Q10H IV  Last administered on 8/22/19at 

08:59;  Start 8/19/19 at 10:49;  Stop 8/22/19 at 10:59;  Status DC


Nitroglycerin (Nitrostat) 0.4 mg PRN Q5MIN  PRN SL CHEST PAIN;  Start 8/19/19 at

11:00


Acetaminophen (Tylenol) 325 mg DAILY PO  Last administered on 8/22/19at 08:59;  

Start 8/20/19 at 09:00


Amlodipine Besylate (Norvasc) 5 mg DAILY PO  Last administered on 8/22/19at 

08:59;  Start 8/20/19 at 09:00


Aspirin (Ecotrin) 325 mg DAILY PO  Last administered on 8/21/19at 08:19;  Start 

8/20/19 at 09:00;  Stop 8/21/19 at 09:00;  Status DC


Insulin Glargine (Lantus) 17 units QHS SQ  Last administered on 8/21/19at 21:20;

 Start 8/19/19 at 21:00


Lisinopril (Prinivil) 10 mg BID PO  Last administered on 8/22/19at 08:59;  Start

8/19/19 at 21:00


Calcium/Vitamin D (Oscal D 500mg/ 200uts) 1 tab DAILY PO  Last administered on 

8/22/19at 08:59;  Start 8/20/19 at 09:00


Insulin Human Lispro (HumaLOG) 5 units DAILYWBKFT SQ  Last administered on 

8/22/19at 09:10;  Start 8/19/19 at 17:00


Insulin Human Lispro (HumaLOG) 10 units DAILYWSUP SQ  Last administered on 

8/21/19at 18:04;  Start 8/19/19 at 17:00


Multivitamins (Thera M Plus) 1 tab DAILY PO  Last administered on 8/22/19at 

08:59;  Start 8/20/19 at 09:00


Non-Formulary Medication (Ubidecarenone (Coq-10)) 100 mg DAILY PO ;  Start 

8/20/19 at 09:00;  Status UNV


Vitamin E 200 unit DAILY PO  Last administered on 8/22/19at 08:59;  Start 

8/20/19 at 09:00


Morphine Sulfate (Morphine Sulfate) 2 mg PRN Q4HRS  PRN IV PAIN;  Start 8/19/19 

at 20:15


Tramadol HCl (Ultram) 50 mg PRN Q6HRS  PRN PO PAIN Last administered on 

8/22/19at 08:59;  Start 8/19/19 at 20:15


Insulin Human Lispro (HumaLOG) 0-5 UNITS TIDWMEALS SQ  Last administered on 

8/22/19at 12:37;  Start 8/20/19 at 12:00


Dextrose (Dextrose 50%-Water Syringe) 12.5 gm PRN Q15MIN  PRN IV SEE COMMENTS;  

Start 8/20/19 at 12:15


Dextrose 250 ml PRN Q15MIN  PRN IV SEE COMMENTS;  Start 8/20/19 at 12:15;  Stop 

8/20/19 at 12:11;  Status DC


Acetaminophen (Tylenol) 650 mg PRN Q6HRS  PRN PO TEMP > 100.4F;  Start 8/20/19 

at 14:45


Acetaminophen (Tylenol Supp) 650 mg PRN Q4HRS  PRN LA TEMP > 100.4F;  Start 

8/20/19 at 14:45


Aspirin (Ecotrin) 325 mg DAILYWBKFT PO ;  Start 8/21/19 at 08:00;  Status Cancel


Aspirin (Aspirin Rectal Supp) 300 mg PRN DAILY  PRN LA IF UNABLE TO TAKE PO;  

Start 8/20/19 at 14:45;  Stop 8/21/19 at 09:00;  Status DC


Atorvastatin Calcium (Lipitor) 20 mg QHS PO  Last administered on 8/21/19at 

21:20;  Start 8/21/19 at 21:00


Aspirin (Ecotrin) 81 mg DAILY PO ;  Start 8/21/19 at 09:00;  Stop 8/21/19 at 

12:51;  Status DC


Clopidogrel Bisulfate (Plavix) 75 mg DAILYWBKFT PO  Last administered on 

8/22/19at 08:59;  Start 8/21/19 at 09:00


Benzocaine (Hurricaine One) 3 spray 1X  ONCE MM  Last administered on 8/21/19at 

15:45;  Start 8/21/19 at 10:15;  Stop 8/21/19 at 10:19;  Status DC


Lidocaine HCl (Xylocaine 2% Topical 30gm Tube) 1 aroldo 1X  ONCE TP  Last 

administered on 8/21/19at 15:45;  Start 8/21/19 at 10:15;  Stop 8/21/19 at 

10:19;  Status DC


Lidocaine HCl (Viscous Lidocaine) 15 ml 1X  ONCE SWSW ;  Start 8/21/19 at 10:15;

 Stop 8/21/19 at 10:19;  Status DC


Ondansetron HCl (Zofran) 4 mg PRN Q6HRS  PRN IV NAUSEA/VOMITING;  Start 8/21/19 

at 10:45;  Stop 8/22/19 at 10:44;  Status DC


Fentanyl Citrate (Fentanyl 2ml Vial) 25 mcg PRN Q5MIN  PRN IV MILD PAIN 1-3;  

Start 8/21/19 at 10:45;  Stop 8/22/19 at 10:44;  Status DC


Fentanyl Citrate (Fentanyl 2ml Vial) 50 mcg PRN Q5MIN  PRN IV MODERATE TO SEVERE

PAIN;  Start 8/21/19 at 10:45;  Stop 8/22/19 at 10:44;  Status DC


Morphine Sulfate (Morphine Sulfate) 1 mg PRN Q10MIN  PRN IV SEVERE PAIN 7-10;  

Start 8/21/19 at 10:45;  Stop 8/22/19 at 10:44;  Status DC


Ringer's Solution 1,000 ml @  30 mls/hr Q24H IV  Last administered on 8/21/19at 

15:47;  Start 8/21/19 at 10:41;  Stop 8/21/19 at 22:40;  Status DC


Lidocaine HCl (Xylocaine-Mpf 1% 2ml Vial) 2 ml PRN 1X  PRN ID PRIOR TO IV START;

 Start 8/21/19 at 10:45;  Stop 8/22/19 at 10:44;  Status DC


Hydromorphone HCl (Dilaudid) 0.5 mg PRN Q10MIN  PRN IV SEV PAIN, Second choice; 

Start 8/21/19 at 10:45;  Stop 8/22/19 at 10:44;  Status DC


Prochlorperazine Edisylate (Compazine) 5 mg PACU PRN  PRN IV NAUSEA, MRX1;  

Start 8/21/19 at 10:45;  Stop 8/22/19 at 10:44;  Status DC


Aspirin (Ecotrin) 81 mg DAILYWBKFT PO  Last administered on 8/22/19at 08:59;  S

tart 8/22/19 at 08:00


Lidocaine HCl (Viscous Lidocaine) 15 ml STK-MED ONCE .ROUTE ;  Start 8/21/19 at 

14:53;  Stop 8/21/19 at 14:53;  Status DC


Benzocaine (Hurricaine One) 1 spray STK-MED ONCE .ROUTE ;  Start 8/21/19 at 

14:53;  Stop 8/21/19 at 14:54;  Status DC


Lidocaine HCl (Xylocaine 2% Topical 30gm Tube) 30 aroldo STK-MED ONCE TP ;  Start 

8/21/19 at 14:54;  Stop 8/21/19 at 14:55;  Status DC


Propofol 20 ml @ As Directed STK-MED ONCE IV ;  Start 8/21/19 at 15:12;  Stop 

8/21/19 at 15:13;  Status DC


Propofol 20 ml @ As Directed STK-MED ONCE IV ;  Start 8/21/19 at 15:12;  Stop 

8/21/19 at 15:13;  Status DC


Lidocaine HCl (Lidocaine Pf 2% Vial) 5 ml STK-MED ONCE .ROUTE ;  Start 8/21/19 

at 15:12;  Stop 8/21/19 at 15:13;  Status DC





Active Scripts


Active


Aspirin Ec (Aspirin) 81 Mg Tablet.dr 81 Mg PO DAILYWBKFT 30 Days


Atorvastatin Calcium 20 Mg Tablet 20 Mg PO QHS 30 Days


Clopidogrel (Clopidogrel Bisulfate) 75 Mg Tablet 75 Mg PO DAILYWBKFT 30 Days


Reported


Novolog Flexpen (Insulin Aspart) 100 Unit/1 Ml Insuln.pen 10 Unit SQ DAILYWSUP


Novolog Flexpen (Insulin Aspart) 100 Unit/1 Ml Insuln.pen 5 Unit SQ DAILYWBKFT


Vitamin E (Vitamin E Acid Succinate) 100 Unit Tablet 100 Unit PO DAILY


Coq-10 (Ubidecarenone) 100 Mg Capsule 100 Mg PO DAILY


Lantus Solostar (Insulin Glargine,Hum.rec.anlog) 100 Unit/1 Ml Insuln.pen 17 

Unit SQ QHS


Caltrate 600 + D Soft Chew Tab (Calcium Carbonate/Vitamin D3) 1 Each Tab.chew 1 

Each PO DAILY


One Daily For Women 50+ Adv Tb (Multivitamins-Min/Fa/Ginkgo) 1 Each Tablet 1 

Each PO DAILY


Tylenol (Acetaminophen) 325 Mg Tablet 325 Mg PO DAILY


Amlodipine Besylate 5 Mg Tablet 5 Mg PO DAILY


Zestril (Lisinopril) 10 Mg Tablet 10 Mg PO BID


Vital Signs





Vital Signs








  Date Time  Temp Pulse Resp B/P (MAP) Pulse Ox O2 Delivery O2 Flow Rate FiO2


 


8/22/19 15:00 98.0 75 18 161/59 (93) 97 Room Air  





 98.0       








Labs





Laboratory Tests








Test


 8/21/19


12:41 8/21/19


17:36 8/21/19


20:47 8/22/19


03:40


 


Glucose (Fingerstick)


 162 mg/dL


(70-99) 160 mg/dL


(70-99) 144 mg/dL


(70-99) 





 


White Blood Count


 


 


 


 7.8 x10^3/uL


(4.0-11.0)


 


Red Blood Count


 


 


 


 3.65 x10^6/uL


(3.50-5.40)


 


Hemoglobin


 


 


 


 10.6 g/dL


(12.0-15.5)


 


Hematocrit


 


 


 


 30.7 %


(36.0-47.0)


 


Mean Corpuscular Volume    84 fL () 


 


Mean Corpuscular Hemoglobin    29 pg (25-35) 


 


Mean Corpuscular Hemoglobin


Concent 


 


 


 35 g/dL


(31-37)


 


Red Cell Distribution Width


 


 


 


 13.3 %


(11.5-14.5)


 


Platelet Count


 


 


 


 292 x10^3/uL


(140-400)


 


Neutrophils (%) (Auto)    75 % (31-73) 


 


Lymphocytes (%) (Auto)    14 % (24-48) 


 


Monocytes (%) (Auto)    9 % (0-9) 


 


Eosinophils (%) (Auto)    2 % (0-3) 


 


Basophils (%) (Auto)    0 % (0-3) 


 


Neutrophils # (Auto)


 


 


 


 5.8 x10^3/uL


(1.8-7.7)


 


Lymphocytes # (Auto)


 


 


 


 1.1 x10^3/uL


(1.0-4.8)


 


Monocytes # (Auto)


 


 


 


 0.7 x10^3/uL


(0.0-1.1)


 


Eosinophils # (Auto)


 


 


 


 0.1 x10^3/uL


(0.0-0.7)


 


Basophils # (Auto)


 


 


 


 0.0 x10^3/uL


(0.0-0.2)


 


Test


 8/22/19


03:45 8/22/19


07:22 8/22/19


11:46 





 


Sodium Level


 134 mmol/L


(136-145) 


 


 





 


Potassium Level


 4.1 mmol/L


(3.5-5.1) 


 


 





 


Chloride Level


 100 mmol/L


() 


 


 





 


Carbon Dioxide Level


 25 mmol/L


(21-32) 


 


 





 


Anion Gap 9 (6-14)    


 


Blood Urea Nitrogen


 19 mg/dL


(7-20) 


 


 





 


Creatinine


 1.0 mg/dL


(0.6-1.0) 


 


 





 


Estimated GFR


(Cockcroft-Gault) 52.3 


 


 


 





 


BUN/Creatinine Ratio 19 (6-20)    


 


Glucose Level


 118 mg/dL


(70-99) 


 


 





 


Calcium Level


 8.6 mg/dL


(8.5-10.1) 


 


 





 


Total Bilirubin


 0.5 mg/dL


(0.2-1.0) 


 


 





 


Aspartate Amino Transf


(AST/SGOT) 21 U/L (15-37) 


 


 


 





 


Alanine Aminotransferase


(ALT/SGPT) 20 U/L (14-59) 


 


 


 





 


Alkaline Phosphatase


 64 U/L


() 


 


 





 


Total Protein


 6.6 g/dL


(6.4-8.2) 


 


 





 


Albumin


 3.0 g/dL


(3.4-5.0) 


 


 





 


Albumin/Globulin Ratio 0.8 (1.0-1.7)    


 


Glucose (Fingerstick)


 


 121 mg/dL


(70-99) 212 mg/dL


(70-99) 











Laboratory Tests








Test


 8/22/19


11:46


 


Glucose (Fingerstick)


 212 mg/dL


(70-99)








Allergies





                                    Allergies








Coded Allergies Type Severity Reaction Last Updated Verified


 


  No Known Drug Allergies    10/8/17 No








Disposition/Orders:  Other (d/c snf)











SOFIA PAUL MD          Aug 23, 2019 08:22
none